# Patient Record
Sex: MALE | Race: BLACK OR AFRICAN AMERICAN | NOT HISPANIC OR LATINO | Employment: FULL TIME | URBAN - METROPOLITAN AREA
[De-identification: names, ages, dates, MRNs, and addresses within clinical notes are randomized per-mention and may not be internally consistent; named-entity substitution may affect disease eponyms.]

---

## 2017-01-06 ENCOUNTER — TELEPHONE (OUTPATIENT)
Dept: FAMILY MEDICINE | Facility: CLINIC | Age: 47
End: 2017-01-06

## 2017-01-06 NOTE — TELEPHONE ENCOUNTER
----- Message from Alexandrea Denson sent at 1/6/2017 12:42 PM CST -----  Contact: [phone]/ self   Pt requesting orders for a stress test . Please advise

## 2017-01-06 NOTE — TELEPHONE ENCOUNTER
Spoke with pt was out of town and starting having chest pains for 2 days on way [place] in [place] went to hospital had EKG it was fine but was told his CKMB level was high and family member told him he needs to have a stress test,MA asked pt would he like to see Tiana first, pt said yes. Schedule pt for Monday morning.

## 2017-01-09 ENCOUNTER — OFFICE VISIT (OUTPATIENT)
Dept: FAMILY MEDICINE | Facility: CLINIC | Age: 47
End: 2017-01-09
Payer: COMMERCIAL

## 2017-01-09 VITALS
BODY MASS INDEX: 29.94 KG/M2 | OXYGEN SATURATION: 99 % | WEIGHT: 186.31 LBS | DIASTOLIC BLOOD PRESSURE: 80 MMHG | HEIGHT: 66 IN | TEMPERATURE: 97 F | SYSTOLIC BLOOD PRESSURE: 120 MMHG | HEART RATE: 62 BPM

## 2017-01-09 DIAGNOSIS — R07.89 ATYPICAL CHEST PAIN: Primary | ICD-10-CM

## 2017-01-09 DIAGNOSIS — Z09 HOSPITAL DISCHARGE FOLLOW-UP: ICD-10-CM

## 2017-01-09 DIAGNOSIS — R74.8 ELEVATED CK-MB LEVEL: ICD-10-CM

## 2017-01-09 PROCEDURE — 99999 PR PBB SHADOW E&M-EST. PATIENT-LVL IV: CPT | Mod: PBBFAC,,, | Performed by: NURSE PRACTITIONER

## 2017-01-09 PROCEDURE — 1159F MED LIST DOCD IN RCRD: CPT | Mod: S$GLB,,, | Performed by: NURSE PRACTITIONER

## 2017-01-09 PROCEDURE — 99214 OFFICE O/P EST MOD 30 MIN: CPT | Mod: S$GLB,,, | Performed by: NURSE PRACTITIONER

## 2017-01-09 RX ORDER — PANTOPRAZOLE SODIUM 40 MG/1
40 TABLET, DELAYED RELEASE ORAL DAILY
Qty: 30 TABLET | Refills: 0 | Status: SHIPPED | OUTPATIENT
Start: 2017-01-09 | End: 2018-03-14 | Stop reason: ALTCHOICE

## 2017-01-09 NOTE — PROGRESS NOTES
"Subjective:       Patient ID: Theo Matthew is a 46 y.o. male.    Chief Complaint: Follow-up (hospital)    HPI Comments: Patient is here today for Hospital follow up.    Patient reports he started with mid-sternal chest wall pain on Friday night 12/30/16 on his way to [place].  He described the pain as a tightness in his chest rated 6/10.  He denied any other symptoms.  He reports that the pain remained from Friday night, all day Saturday and into Gregory morning.  Again all day Saturday the chest tightness was only symptoms - constant tightness rated 6/10 with pain that worsened when he bent over and stood back up - 8/10.  He denied any shortness of breath, no nausea, no vomting, no heartburn.  He reports he did take an OTC Zantac 1 on Sat. AM and 1 on Sat. PM with no changes.  Patient reports on Gregory morning, 1/1/17 - he had increased belching and the pain did reduce down to a 4/10 but still present.  By Monday, 1/2/17, he was driving back home from [place] and he reports that the chest tightness was still a 4/10 but also reports he felt numbness/tingling to right hand and bilateral feet so he hot scared and stopped at [place] ER.  He has ER records.  Diagnosed with Atypical Chest Wall Pain.  The EKG was Normal Sinus Rhythm.  The labs:  CBC - very mild anemia present, CMP okay.  CK-MB was elevated at 5.1.  Troponin normal at < 0.4.    Patient reports that the chest tightness is now more just a very light soreness and no other symptoms now.  He has family members that are Doctors in [place] that advised patient to see PCP for possible stress test per patient report.    Patient had just had a Wellness Exam August 23, 2016.  Patient has no medical history.  Blood pressure is normal./80 (BP Location: Left arm, Patient Position: Sitting, BP Method: Manual)  Pulse 62  Temp 97.3 °F (36.3 °C) (Oral)   Ht 5' 6" (1.676 m)  Wt 84.5 kg (186 lb 4.6 oz)  SpO2 99%  BMI 30.07 kg/m2  His cholesterol levels - the Total " cholesterol was on higher end of normal but fine at 196, LDL borderline high at 144.4 and HDL mildly low at 38.  Patient reports no significant family history of heart disease. Positive obesity with Body mass index is 30.07 kg/(m^2).          Previous Medications    No medications on file       Past Medical History   Diagnosis Date    Internal hemorrhoid        Past Surgical History   Procedure Laterality Date    Knee ligament reconstruction Right        Family History   Problem Relation Age of Onset    Seizures Father     Hypertension Mother     No Known Problems Sister     Cancer Maternal Grandmother      passed 83 pancreate cancer    Diabetes Maternal Grandfather     No Known Problems Paternal Grandfather        Social History     Social History    Marital status:      Spouse name: N/A    Number of children: N/A    Years of education: N/A     Occupational History    baggage claims      Social History Main Topics    Smoking status: Never Smoker    Smokeless tobacco: None    Alcohol use No    Drug use: No    Sexual activity: Not Asked     Other Topics Concern    None     Social History Narrative       Review of Systems   Constitutional: Negative for activity change, appetite change, fatigue, fever and unexpected weight change.   HENT: Negative for congestion, ear pain, mouth sores, nosebleeds, postnasal drip, rhinorrhea, sinus pressure, sneezing, sore throat, trouble swallowing and voice change.    Eyes: Negative.    Respiratory: Positive for chest tightness. Negative for cough and shortness of breath.         Chest tightness is mostly resolved now but had chest tightness with elevated CK-MB at ER visit.   Cardiovascular: Negative for chest pain, palpitations and leg swelling.   Gastrointestinal: Negative.  Negative for abdominal pain, blood in stool, constipation, diarrhea, nausea and vomiting.   Endocrine: Negative.    Genitourinary: Negative for difficulty urinating, dysuria, flank pain,  "hematuria and urgency.   Musculoskeletal: Negative for arthralgias, back pain, gait problem, joint swelling, myalgias and neck pain.   Skin: Negative for color change, rash and wound.   Allergic/Immunologic: Negative for immunocompromised state.   Neurological: Negative for dizziness, tremors, seizures, syncope, speech difficulty and headaches.   Hematological: Negative for adenopathy. Does not bruise/bleed easily.   Psychiatric/Behavioral: Negative for behavioral problems, dysphoric mood, sleep disturbance and suicidal ideas. The patient is not nervous/anxious.          Objective:     Vitals:    01/09/17 0920   BP: 120/80   BP Location: Left arm   Patient Position: Sitting   BP Method: Manual   Pulse: 62   Temp: 97.3 °F (36.3 °C)   TempSrc: Oral   SpO2: 99%   Weight: 84.5 kg (186 lb 4.6 oz)   Height: 5' 6" (1.676 m)          Physical Exam   Constitutional: He is oriented to person, place, and time. He appears well-developed and well-nourished.   Body mass index is 30.07 kg/(m^2).     HENT:   Head: Normocephalic.   Right Ear: External ear normal.   Left Ear: External ear normal.   Nose: Nose normal.   Mouth/Throat: Oropharynx is clear and moist. No oropharyngeal exudate.   Eyes: EOM are normal. Pupils are equal, round, and reactive to light. Right eye exhibits no discharge. Left eye exhibits no discharge. No scleral icterus.   Neck: Normal range of motion. Neck supple. No tracheal deviation present. No thyromegaly present.   Cardiovascular: Normal rate, regular rhythm and normal heart sounds.    No murmur heard.  Pulmonary/Chest: Effort normal and breath sounds normal. No respiratory distress.   Abdominal: Soft. He exhibits no distension.   Musculoskeletal: Normal range of motion. He exhibits no edema.   Lymphadenopathy:     He has no cervical adenopathy.   Neurological: He is alert and oriented to person, place, and time. Coordination normal.   Skin: Skin is warm and dry. No rash noted.   Psychiatric: He has a " normal mood and affect. His behavior is normal.         Assessment:         ICD-10-CM ICD-9-CM   1. Atypical chest pain R07.89 786.59   2. Elevated CK-MB level R74.8 790.5   3. Hospital discharge follow-up Z09 V67.59       Plan:       Atypical chest pain  -  Will refer patient to cardiology - appointment made on 1/11/17 - atypical chest pain and mildly elevated CK-Mb - the cardiologist could then decide on appropriate testing for patient.  Take Protonix 40 mg daily for 4 weeks to see if this will help resolve pain.  -     Ambulatory Referral to Cardiology  -     pantoprazole (PROTONIX) 40 MG tablet; Take 1 tablet (40 mg total) by mouth once daily.  Dispense: 30 tablet; Refill: 0    Elevated CK-MB level  -     Ambulatory Referral to Cardiology    Hospital discharge follow-up  -  Hospital ER record scanned into media file.    Return for keep appointment with cardiology.     Patient's Medications   New Prescriptions    PANTOPRAZOLE (PROTONIX) 40 MG TABLET    Take 1 tablet (40 mg total) by mouth once daily.   Previous Medications    No medications on file   Modified Medications    No medications on file   Discontinued Medications    No medications on file

## 2017-01-09 NOTE — PATIENT INSTRUCTIONS

## 2017-01-09 NOTE — MR AVS SNAPSHOT
[facility] Medicine  [address]  [place] [address]  Phone: [phone]  Fax: [phone]                  Theo Matthew   2017 8:20 AM   Office Visit    Description:  Male : 1970   Provider:  Tiana Renae NP   Department:  [facility]           Reason for Visit     Follow-up           Diagnoses this Visit        Comments    Atypical chest pain    -  Primary     Elevated CK-MB level         Hospital discharge follow-up                To Do List           Future Appointments        Provider Department Dept Phone    2017 2:00 PM MD [facility] - Cardiology [phone]      Goals (5 Years of Data)     None      Follow-Up and Disposition     Return for keep appointment with cardiology.       These Medications        Disp Refills Start End    pantoprazole (PROTONIX) 40 MG tablet 30 tablet 0 2017    Take 1 tablet (40 mg total) by mouth once daily. - Oral    Pharmacy: [facility]/pharmacy #[id number] - [place] - [address] [facility] Ph #: [phone]         [facility] On Call     [facility] On Call Nurse Care Line -  Assistance  Registered nurses in the [facility] On Call Center provide clinical advisement, health education, appointment booking, and other advisory services.  Call for this free service at [phone].             Medications           START taking these NEW medications        Refills    pantoprazole (PROTONIX) 40 MG tablet 0    Sig: Take 1 tablet (40 mg total) by mouth once daily.    Class: Normal    Route: Oral           Verify that the below list of medications is an accurate representation of the medications you are currently taking.  If none reported, the list may be blank. If incorrect, please contact your healthcare provider. Carry this list with you in case of emergency.           Current Medications     pantoprazole (PROTONIX) 40 MG tablet Take 1 tablet (40 mg total) by mouth once daily.           Clinical Reference  "Information           Vital Signs - Last Recorded  Most recent update: 1/9/2017  9:28 AM by Chrissy Pace MA    BP Pulse Temp Ht Wt SpO2    120/80 (BP Location: Left arm, Patient Position: Sitting, BP Method: Manual) 62 97.3 °F (36.3 °C) (Oral) 5' 6" (1.676 m) 84.5 kg (186 lb 4.6 oz) 99%    BMI                30.07 kg/m2          Blood Pressure          Most Recent Value    BP  120/80      Allergies as of 1/9/2017     No Known Allergies      Immunizations Administered on Date of Encounter - 1/9/2017     None      Orders Placed During Today's Visit      Normal Orders This Visit    Ambulatory Referral to Cardiology       [facility] Sign-Up     Activating your [facility] account is as easy as 1-2-3!     1) Visit [email or web address], select Sign Up Now, enter this activation code and your date of birth, then select Next.  [email or web address]  Expires: 2/23/2017 10:06 AM      2) Create a username and password to use when you visit [facility] in the future and select a security question in case you lose your password and select Next.    3) Enter your e-mail address and click Sign Up!    Additional Information  If you have questions, please e-mail [email or web address] or call [phone] to talk to our [facility] staff. Remember, [facility] is NOT to be used for urgent needs. For medical emergencies, dial 911.         Instructions      GERD (Adult)    The esophagus is a tube that carries food from the mouth to the stomach. A valve at the lower end of the esophagus prevents stomach acid from flowing upward. When this valve doesn't work properly, stomach contents may repeatedly flow back up (reflux) into the esophagus. This is called gastroesophageal reflux disease (GERD). GERD can irritate the esophagus. It can cause problems with swallowing or breathing. In severe cases, GERD can cause recurrent pneumonia or other serious problems.  Symptoms of reflux include burning, pressure or sharp pain in the upper abdomen or mid to lower " "chest. The pain can spread to the neck, back, or shoulder. There may be belching, an acid taste in the back of the throat, chronic cough, or sore throat or hoarseness. GERD symptoms often occur during the day after a big meal. They can also occur at night when lying down.   Home care  Lifestyle changes can help reduce symptoms. If needed, medicines may be prescribed. Symptoms often improve with treatment, but if treatment is stopped, the symptoms often return after a few months. So most persons with GERD will need to continue treatment.  Lifestyle changes  · Limit or avoid fatty, fried, and spicy foods, as well as coffee, chocolate, mint, and foods with high acid content such as tomatoes and citrus fruit and juices (orange, grapefruit, lemon).  · Dont eat large meals, especially at night. Frequent, smaller meals are best. Do not lie down right after eating. And dont eat anything 3 hours before going to bed.  · Avoid drinking alcohol and smoking. As much as possible, stay away from second hand smoke.  · If you are overweight, losing weight will reduce symptoms.   · Avoid wearing tight clothing around your stomach area.  · If your symptoms occur during sleep, use a foam wedge to elevate your upper body (not just your head.) Or, place 4" blocks under the head of your bed.  Medicines  If needed, medicines can help relieve the symptoms of GERD and prevent damage to the esophagus. Discuss a medicine plan with your healthcare provider. This may include one or more of the following medicines:  · Antacids to help neutralize the normal acids in your stomach.  · Acid blockers (H2 blockers) to decrease acid production.  · Acid inhibitors (PPIs) to decrease acid production in a different way than the blockers. They may work better, but can take a little longer to take effect.  Take an antacid 30-60 minutes after eating and at bedtime, but not at the same time as an acid blocker.  Try not to take medicines such as ibuprofen and " aspirin. If you are taking aspirin for your heart or other medical reasons, talk to your healthcare provider about stopping it.  Follow-up care  Follow up with your healthcare provider or as advised by our staff.  When to seek medical advice  Call your healthcare provider if any of the following occur:  · Stomach pain gets worse or moves to the lower right abdomen (appendix area)  · Chest pain appears or gets worse, or spreads to the back, neck, shoulder, or arm  · Frequent vomiting (cant keep down liquids)  · Blood in the stool or vomit (red or black in color)  · Feeling weak or dizzy  · Fever of 100.4ºF (38ºC) or higher, or as directed by your healthcare provider  © 2105-4860 [employer]. [address] [place] [address], [place] [address]. All rights reserved. This information is not intended as a substitute for professional medical care. Always follow your healthcare professional's instructions.

## 2017-01-11 ENCOUNTER — OFFICE VISIT (OUTPATIENT)
Dept: CARDIOLOGY | Facility: CLINIC | Age: 47
End: 2017-01-11
Payer: COMMERCIAL

## 2017-01-11 VITALS
SYSTOLIC BLOOD PRESSURE: 127 MMHG | OXYGEN SATURATION: 97 % | BODY MASS INDEX: 29.62 KG/M2 | WEIGHT: 184.31 LBS | HEIGHT: 66 IN | HEART RATE: 67 BPM | DIASTOLIC BLOOD PRESSURE: 88 MMHG

## 2017-01-11 DIAGNOSIS — R07.9 CHEST PAIN, UNSPECIFIED TYPE: Primary | ICD-10-CM

## 2017-01-11 PROCEDURE — 99204 OFFICE O/P NEW MOD 45 MIN: CPT | Mod: S$GLB,,, | Performed by: INTERNAL MEDICINE

## 2017-01-11 PROCEDURE — 99999 PR PBB SHADOW E&M-EST. PATIENT-LVL III: CPT | Mod: PBBFAC,,, | Performed by: INTERNAL MEDICINE

## 2017-01-11 PROCEDURE — 1159F MED LIST DOCD IN RCRD: CPT | Mod: S$GLB,,, | Performed by: INTERNAL MEDICINE

## 2017-01-11 NOTE — PROGRESS NOTES
Subjective:   Patient ID:  Theo Matthew is a 46 y.o. male who presents for evaluation of Establish Care (chest pain)      HPI: 45 y/o AA male with no significant PMH is here to establish care secondary to chest pain that occurred about 2 weeks ago while he was in [place] visiting family. Discomfort was located mid sternally, tight in quality and moderate in intensity. No associated dyspnea and bending over made discomfort worse. He went to ER in [place] and ECG and labs were normal. On his way home he had symptoms again associated with numbness and tingling in arms so he stopped at ER in [place]. ECG and labs again was normal. He does not smoke and have no DM or HTN. No known family history of CAD/MI. BP is controlled. He is currently working with [employer] and does exertional work, he was at work today with no symptoms.     Of note patient fasted for 3 days for religous reason and episode occurred after eating for the first time after that time.     Past Medical History   Diagnosis Date    Internal hemorrhoid        Past Surgical History   Procedure Laterality Date    Knee ligament reconstruction Right        Social History   Substance Use Topics    Smoking status: Never Smoker    Smokeless tobacco: None    Alcohol use No       Family History   Problem Relation Age of Onset    Seizures Father     Hypertension Mother     No Known Problems Sister     Cancer Maternal Grandmother      passed 83 pancreate cancer    Diabetes Maternal Grandfather     No Known Problems Paternal Grandfather        Patient's Medications   New Prescriptions    No medications on file   Previous Medications    PANTOPRAZOLE (PROTONIX) 40 MG TABLET    Take 1 tablet (40 mg total) by mouth once daily.   Modified Medications    No medications on file   Discontinued Medications    No medications on file        Review of patient's allergies indicates:  No Known Allergies    Review of Systems   Constitution: Negative.   HENT: Negative.     Eyes: Negative.    Cardiovascular: Negative.    Respiratory: Negative.    Endocrine: Negative.    Hematologic/Lymphatic: Negative.    Skin: Negative.    Musculoskeletal: Negative.    Gastrointestinal: Negative.    Neurological: Negative.    Psychiatric/Behavioral: Negative.      Objective:   Physical Exam   Constitutional: He is oriented to person, place, and time. He appears well-developed and well-nourished. No distress.   Examination of the digits showed no clubbing or cyanosis   HENT:   Head: Normocephalic and atraumatic.   Eyes: Conjunctivae are normal. Pupils are equal, round, and reactive to light. Right eye exhibits no discharge.   Neck: Normal range of motion. Neck supple. No JVD present. No thyromegaly present.   No carotid bruits   Cardiovascular: Normal rate, regular rhythm, S1 normal, S2 normal, normal heart sounds, intact distal pulses and normal pulses.  PMI is not displaced.  Exam reveals no gallop, no friction rub and no opening snap.    No murmur heard.  Pulmonary/Chest: Effort normal and breath sounds normal. No respiratory distress. He has no wheezes. He has no rales. He exhibits no tenderness.   Abdominal: Soft. Bowel sounds are normal. He exhibits no distension and no mass. There is no tenderness. There is no guarding.   No hepatosplenomegaly   Musculoskeletal: Normal range of motion. He exhibits no edema or tenderness.   Lymphadenopathy:     He has no cervical adenopathy.   Neurological: He is alert and oriented to person, place, and time.   Skin: Skin is warm. No rash noted. He is not diaphoretic. No erythema.   Psychiatric: He has a normal mood and affect.   Nursing note and vitals reviewed.      Lab Results   Component Value Date    WBC 5.92 08/10/2016    HGB 14.1 08/10/2016    HCT 41.9 08/10/2016    MCV 87 08/10/2016     08/10/2016         Chemistry        Component Value Date/Time     08/10/2016 1503    K 3.8 08/10/2016 1503     08/10/2016 1503    CO2 30 (H)  08/10/2016 1503    BUN 20 08/10/2016 1503    CREATININE 1.0 08/10/2016 1503    GLU 97 08/10/2016 1503        Component Value Date/Time    CALCIUM 9.3 08/10/2016 1503    ALKPHOS 56 08/10/2016 1503    AST 19 08/10/2016 1503    ALT 17 08/10/2016 1503    BILITOT 0.5 08/10/2016 1503            Lab Results   Component Value Date    CHOL 196 08/10/2016     Lab Results   Component Value Date    HDL 38 (L) 08/10/2016     Lab Results   Component Value Date    LDLCALC 144.4 08/10/2016     Lab Results   Component Value Date    TRIG 68 08/10/2016     Lab Results   Component Value Date    CHOLHDL 19.4 (L) 08/10/2016       Lab Results   Component Value Date    TSH 1.130 08/10/2016       No results found for: HGBA1C    ECGs reviewed-Sinus bradycardia  LABS reviewed-in media      Assessment:     1. Chest pain, unspecified type        Plan:     Discomfort unlikely cardiac, pain is however considered atypical. No risk factors such as HTN, DM, smoking or family history.    Will get stress echo complete  Activity as tolerated  F/u in 6 months.

## 2017-01-11 NOTE — LETTER
January 11, 2017      [place]  [address]  [address]  [facility] [address]           [place] - Cardiology  [address], [address]  [place] [address]  Phone: [phone]          Patient: Theo Matthew   MR Number: [id number]   YOB: 1970   Date of Visit: 1/11/2017       Dear Tiana Renae:    Thank you for referring Theo Matthew to me for evaluation. Attached you will find relevant portions of my assessment and plan of care.    If you have questions, please do not hesitate to call me. I look forward to following Theo Matthew along with you.    Sincerely,    Mary Cordero MD    Enclosure  CC:  No Recipients    If you would like to receive this communication electronically, please contact [email or web address] or [phone] to request more information on [employer] access.    For providers and/or their staff who would like to refer a patient to [facility], please contact us through our one-stop-shop provider referral line, Kassandra Craven, at [phone].    If you feel you have received this communication in error or would no longer like to receive these types of communications, please e-mail [email or web address]

## 2017-01-11 NOTE — MR AVS SNAPSHOT
"    [place] - Cardiology  [address], [address]  [place] [address]  Phone: [phone]                  Theo Matthew   2017 2:00 PM   Office Visit    Description:  Male : 1970   Provider:  Mary Cordero MD   Department:  [facility] - Cardiology           Reason for Visit     Establish Care           Diagnoses this Visit        Comments    Chest pain, unspecified type    -  Primary            To Do List           Goals (5 Years of Data)     None      [facility] On Call     [facility] On Call Nurse Care Line -  Assistance  Registered nurses in the [facility] On Call Center provide clinical advisement, health education, appointment booking, and other advisory services.  Call for this free service at [phone].             Medications                Verify that the below list of medications is an accurate representation of the medications you are currently taking.  If none reported, the list may be blank. If incorrect, please contact your healthcare provider. Carry this list with you in case of emergency.           Current Medications     pantoprazole (PROTONIX) 40 MG tablet Take 1 tablet (40 mg total) by mouth once daily.           Clinical Reference Information           Vital Signs - Last Recorded  Most recent update: 2017  2:08 PM by Adriane M Toussaint, LPN    BP Pulse Ht Wt SpO2 BMI    127/88 67 5' 6" (1.676 m) 83.6 kg (184 lb 4.8 oz) 97% 29.75 kg/m2      Blood Pressure          Most Recent Value    BP  127/88      Allergies as of 2017     No Known Allergies      Immunizations Administered on Date of Encounter - 2017     None      Orders Placed During Today's Visit     Future Labs/Procedures Expected by Expires    Exercise stress echo with color flow  As directed 2018      [facility] Sign-Up     Activating your [facility] account is as easy as 1-2-3!     1) Visit [email or web address], select Sign Up Now, enter this activation code and your date of birth, then select " Next.  [email or web address]  Expires: 2/23/2017 10:06 AM      2) Create a username and password to use when you visit [facility] in the future and select a security question in case you lose your password and select Next.    3) Enter your e-mail address and click Sign Up!    Additional Information  If you have questions, please e-mail [email or web address] or call [phone] to talk to our [facility] staff. Remember, [facility] is NOT to be used for urgent needs. For medical emergencies, dial 911.

## 2017-01-16 ENCOUNTER — HOSPITAL ENCOUNTER (OUTPATIENT)
Dept: CARDIOLOGY | Facility: HOSPITAL | Age: 47
Discharge: HOME | End: 2017-01-16
Attending: INTERNAL MEDICINE
Payer: COMMERCIAL

## 2017-01-16 DIAGNOSIS — R07.9 CHEST PAIN, UNSPECIFIED TYPE: ICD-10-CM

## 2017-01-16 LAB
ESTIMATED PA SYSTOLIC PRESSURE: 18.15
RETIRED EF AND QEF - SEE NOTES: 65 (ref 55–65)

## 2017-01-16 PROCEDURE — 93325 DOPPLER ECHO COLOR FLOW MAPG: CPT | Mod: 26,,, | Performed by: INTERNAL MEDICINE

## 2017-01-16 PROCEDURE — 93351 STRESS TTE COMPLETE: CPT | Mod: 26,,, | Performed by: INTERNAL MEDICINE

## 2017-01-16 PROCEDURE — 93351 STRESS TTE COMPLETE: CPT

## 2017-01-16 PROCEDURE — 93320 DOPPLER ECHO COMPLETE: CPT | Mod: 26,,, | Performed by: INTERNAL MEDICINE

## 2017-01-17 ENCOUNTER — TELEPHONE (OUTPATIENT)
Dept: CARDIOLOGY | Facility: CLINIC | Age: 47
End: 2017-01-17

## 2017-01-17 NOTE — TELEPHONE ENCOUNTER
patient notified that stress test is negative. Chest pain likely gas or muscle skeletal pain.   Patient verbalized understanding.

## 2017-02-08 DIAGNOSIS — R07.89 ATYPICAL CHEST PAIN: ICD-10-CM

## 2017-02-08 RX ORDER — PANTOPRAZOLE SODIUM 40 MG/1
TABLET, DELAYED RELEASE ORAL
Qty: 30 TABLET | Refills: 0 | OUTPATIENT
Start: 2017-02-08

## 2018-03-14 ENCOUNTER — OFFICE VISIT (OUTPATIENT)
Dept: FAMILY MEDICINE | Facility: CLINIC | Age: 48
End: 2018-03-14
Payer: COMMERCIAL

## 2018-03-14 VITALS
HEART RATE: 79 BPM | WEIGHT: 192.44 LBS | DIASTOLIC BLOOD PRESSURE: 80 MMHG | TEMPERATURE: 99 F | HEIGHT: 66 IN | SYSTOLIC BLOOD PRESSURE: 112 MMHG | BODY MASS INDEX: 30.93 KG/M2 | OXYGEN SATURATION: 97 %

## 2018-03-14 DIAGNOSIS — Z00.00 ANNUAL PHYSICAL EXAM: Primary | ICD-10-CM

## 2018-03-14 DIAGNOSIS — Z13.29 THYROID DISORDER SCREEN: ICD-10-CM

## 2018-03-14 DIAGNOSIS — Z13.1 DIABETES MELLITUS SCREENING: ICD-10-CM

## 2018-03-14 DIAGNOSIS — Z13.220 SCREENING CHOLESTEROL LEVEL: ICD-10-CM

## 2018-03-14 DIAGNOSIS — Z13.0 SCREENING, ANEMIA, DEFICIENCY, IRON: ICD-10-CM

## 2018-03-14 PROCEDURE — 99999 PR PBB SHADOW E&M-EST. PATIENT-LVL III: CPT | Mod: PBBFAC,,, | Performed by: NURSE PRACTITIONER

## 2018-03-14 PROCEDURE — 99396 PREV VISIT EST AGE 40-64: CPT | Mod: S$GLB,,, | Performed by: NURSE PRACTITIONER

## 2018-03-14 NOTE — PROGRESS NOTES
Subjective:       Patient ID: Theo Matthew is a 47 y.o. male.    Chief Complaint: Annual Exam (wellness)    Patient is a 47-year-old black male that is here for annual physical exam.  Patient reports no significant past medical history.  Patient will go next week for fasting labs.    Health maintenance:  -  Patient declines flu vaccine    Patient only complaint is of intermittent vision changes.  Patient advised to see eye doctor to evaluate patient.        Previous Medications    No medications on file       Past Medical History:   Diagnosis Date    Internal hemorrhoid        Past Surgical History:   Procedure Laterality Date    KNEE LIGAMENT RECONSTRUCTION Right        Family History   Problem Relation Age of Onset    Seizures Father     Hypertension Mother     No Known Problems Sister     Cancer Maternal Grandmother      passed 83 pancreate cancer    Diabetes Maternal Grandfather     No Known Problems Paternal Grandfather        Social History     Social History    Marital status:      Spouse name: N/A    Number of children: N/A    Years of education: N/A     Occupational History    baggage claims      Social History Main Topics    Smoking status: Never Smoker    Smokeless tobacco: None    Alcohol use No    Drug use: No    Sexual activity: Not Asked     Other Topics Concern    None     Social History Narrative    None       Review of Systems   Constitutional: Negative for activity change, appetite change, fatigue, fever and unexpected weight change.   HENT: Negative for congestion, ear pain, mouth sores, nosebleeds, postnasal drip, rhinorrhea, sinus pressure, sneezing, sore throat, trouble swallowing and voice change.    Eyes: Negative.    Respiratory: Negative for cough, chest tightness and shortness of breath.    Cardiovascular: Negative for chest pain, palpitations and leg swelling.   Gastrointestinal: Negative.  Negative for abdominal pain, blood in stool, constipation, diarrhea,  "nausea and vomiting.   Endocrine: Negative.    Genitourinary: Negative for difficulty urinating, dysuria, flank pain, hematuria and urgency.   Musculoskeletal: Negative for arthralgias, back pain, gait problem, joint swelling, myalgias and neck pain.   Skin: Negative for color change, rash and wound.   Allergic/Immunologic: Negative for immunocompromised state.   Neurological: Negative for dizziness, tremors, seizures, syncope, speech difficulty and headaches.   Hematological: Negative for adenopathy. Does not bruise/bleed easily.   Psychiatric/Behavioral: Negative for behavioral problems, dysphoric mood, sleep disturbance and suicidal ideas. The patient is not nervous/anxious.          Objective:     Vitals:    03/14/18 1436   BP: 112/80   BP Location: Left arm   Patient Position: Sitting   BP Method: Medium (Manual)   Pulse: 79   Temp: 98.7 °F (37.1 °C)   TempSrc: Oral   SpO2: 97%   Weight: 87.3 kg (192 lb 7.4 oz)   Height: 5' 6" (1.676 m)          Physical Exam   Constitutional: He is oriented to person, place, and time. He appears well-developed and well-nourished.   Body mass index is 31.06 kg/m².     HENT:   Head: Normocephalic.   Right Ear: External ear normal.   Left Ear: External ear normal.   Nose: Nose normal.   Mouth/Throat: Oropharynx is clear and moist. No oropharyngeal exudate.   Eyes: EOM are normal. Pupils are equal, round, and reactive to light. Right eye exhibits no discharge. Left eye exhibits no discharge. No scleral icterus.   Neck: Normal range of motion. Neck supple. No tracheal deviation present. No thyromegaly present.   Cardiovascular: Normal rate, regular rhythm and normal heart sounds.    No murmur heard.  Pulmonary/Chest: Effort normal and breath sounds normal. No respiratory distress.   Abdominal: Soft. He exhibits no distension and no mass. There is no tenderness. There is no guarding.   Musculoskeletal: Normal range of motion. He exhibits no edema.   Lymphadenopathy:     He has no " cervical adenopathy.   Neurological: He is alert and oriented to person, place, and time. Coordination normal.   Skin: Skin is warm and dry. No rash noted.   Psychiatric: He has a normal mood and affect. His behavior is normal.         Assessment:         ICD-10-CM ICD-9-CM   1. Annual physical exam Z00.00 V70.0   2. Screening, anemia, deficiency, iron Z13.0 V78.0   3. Thyroid disorder screen Z13.29 V77.0   4. Screening cholesterol level Z13.220 V77.91   5. Diabetes mellitus screening Z13.1 V77.1       Plan:       Annual physical exam  -  Patient to go get fasting labs next week.  We'll send results of a patient portal.  Health Maintenance Summary     Lipid Panel Next Due 8/10/2021     Done 8/10/2016 LIPID PANEL    TETANUS VACCINE Next Due 8/22/2026     Done 8/22/2016 Imm Admin: Tdap   Influenza Vaccine Addressed     Declined 3/14/2018     Declined 1/9/2017        Screening, anemia, deficiency, iron  -     CBC auto differential; Future; Expected date: 03/14/2018    Thyroid disorder screen  -     TSH; Future; Expected date: 03/14/2018    Screening cholesterol level  -     Lipid panel; Future; Expected date: 03/14/2018    Diabetes mellitus screening  -     Comprehensive metabolic panel; Future; Expected date: 03/14/2018      Follow-up in about 1 year (around 3/14/2019).     Patient's Medications   New Prescriptions    No medications on file   Previous Medications    No medications on file   Modified Medications    No medications on file   Discontinued Medications    PANTOPRAZOLE (PROTONIX) 40 MG TABLET    Take 1 tablet (40 mg total) by mouth once daily.

## 2018-03-16 ENCOUNTER — PATIENT MESSAGE (OUTPATIENT)
Dept: FAMILY MEDICINE | Facility: CLINIC | Age: 48
End: 2018-03-16

## 2018-03-16 ENCOUNTER — LAB VISIT (OUTPATIENT)
Dept: LAB | Facility: HOSPITAL | Age: 48
End: 2018-03-16
Attending: NURSE PRACTITIONER
Payer: COMMERCIAL

## 2018-03-16 DIAGNOSIS — Z13.1 DIABETES MELLITUS SCREENING: ICD-10-CM

## 2018-03-16 DIAGNOSIS — Z13.29 THYROID DISORDER SCREEN: ICD-10-CM

## 2018-03-16 DIAGNOSIS — Z13.220 SCREENING CHOLESTEROL LEVEL: ICD-10-CM

## 2018-03-16 DIAGNOSIS — Z13.0 SCREENING, ANEMIA, DEFICIENCY, IRON: ICD-10-CM

## 2018-03-16 LAB
ALBUMIN SERPL BCP-MCNC: 4.2 G/DL
ALP SERPL-CCNC: 56 U/L
ALT SERPL W/O P-5'-P-CCNC: 22 U/L
ANION GAP SERPL CALC-SCNC: 10 MMOL/L
AST SERPL-CCNC: 24 U/L
BASOPHILS # BLD AUTO: 0.02 K/UL
BASOPHILS NFR BLD: 0.3 %
BILIRUB SERPL-MCNC: 0.9 MG/DL
BUN SERPL-MCNC: 18 MG/DL
CALCIUM SERPL-MCNC: 9.9 MG/DL
CHLORIDE SERPL-SCNC: 102 MMOL/L
CHOLEST SERPL-MCNC: 218 MG/DL
CHOLEST/HDLC SERPL: 5.7 {RATIO}
CO2 SERPL-SCNC: 24 MMOL/L
CREAT SERPL-MCNC: 1 MG/DL
DIFFERENTIAL METHOD: ABNORMAL
EOSINOPHIL # BLD AUTO: 0 K/UL
EOSINOPHIL NFR BLD: 0.6 %
ERYTHROCYTE [DISTWIDTH] IN BLOOD BY AUTOMATED COUNT: 13.2 %
EST. GFR  (AFRICAN AMERICAN): >60 ML/MIN/1.73 M^2
EST. GFR  (NON AFRICAN AMERICAN): >60 ML/MIN/1.73 M^2
GLUCOSE SERPL-MCNC: 86 MG/DL
HCT VFR BLD AUTO: 43 %
HDLC SERPL-MCNC: 38 MG/DL
HDLC SERPL: 17.4 %
HGB BLD-MCNC: 14.7 G/DL
LDLC SERPL CALC-MCNC: 168 MG/DL
LYMPHOCYTES # BLD AUTO: 3.6 K/UL
LYMPHOCYTES NFR BLD: 55.2 %
MCH RBC QN AUTO: 29.6 PG
MCHC RBC AUTO-ENTMCNC: 34.2 G/DL
MCV RBC AUTO: 87 FL
MONOCYTES # BLD AUTO: 0.5 K/UL
MONOCYTES NFR BLD: 7.3 %
NEUTROPHILS # BLD AUTO: 2.4 K/UL
NEUTROPHILS NFR BLD: 36.4 %
NONHDLC SERPL-MCNC: 180 MG/DL
PLATELET # BLD AUTO: 196 K/UL
PMV BLD AUTO: 11.5 FL
POTASSIUM SERPL-SCNC: 4 MMOL/L
PROT SERPL-MCNC: 7.8 G/DL
RBC # BLD AUTO: 4.97 M/UL
SODIUM SERPL-SCNC: 136 MMOL/L
TRIGL SERPL-MCNC: 60 MG/DL
TSH SERPL DL<=0.005 MIU/L-ACNC: 0.96 UIU/ML
WBC # BLD AUTO: 6.54 K/UL

## 2018-03-16 PROCEDURE — 80053 COMPREHEN METABOLIC PANEL: CPT

## 2018-03-16 PROCEDURE — 36415 COLL VENOUS BLD VENIPUNCTURE: CPT

## 2018-03-16 PROCEDURE — 85025 COMPLETE CBC W/AUTO DIFF WBC: CPT

## 2018-03-16 PROCEDURE — 84443 ASSAY THYROID STIM HORMONE: CPT

## 2018-03-16 PROCEDURE — 80061 LIPID PANEL: CPT

## 2018-03-19 ENCOUNTER — PATIENT MESSAGE (OUTPATIENT)
Dept: FAMILY MEDICINE | Facility: CLINIC | Age: 48
End: 2018-03-19

## 2018-03-19 ENCOUNTER — TELEPHONE (OUTPATIENT)
Dept: FAMILY MEDICINE | Facility: CLINIC | Age: 48
End: 2018-03-19

## 2019-12-06 ENCOUNTER — TELEPHONE (OUTPATIENT)
Dept: FAMILY MEDICINE | Facility: CLINIC | Age: 49
End: 2019-12-06

## 2019-12-06 NOTE — TELEPHONE ENCOUNTER
----- Message from Estella Cotter sent at 12/6/2019  3:13 PM CST -----  Contact: self / [phone]  Wants to be seen on Monday 9th for an annual appointment. Please advise

## 2019-12-06 NOTE — TELEPHONE ENCOUNTER
----- Message from Viktoriya Alberts sent at 12/6/2019  4:46 PM CST -----  Contact: [phone]/self  Patient returning your call   Please advise

## 2019-12-06 NOTE — TELEPHONE ENCOUNTER
Called and spoke with pt in regards of needing an appt for a Wellness exam. Informed pt NP Batool was out of the offfice. Informed pt NP Guero has appt, but all the dates and times were not working for pt. Pt made an appt on 12/16/2019 to see Dr. Becerra for his Wellness. Informed pt to come to office fasting for labs.

## 2019-12-16 ENCOUNTER — OFFICE VISIT (OUTPATIENT)
Dept: FAMILY MEDICINE | Facility: CLINIC | Age: 49
End: 2019-12-16
Payer: COMMERCIAL

## 2019-12-16 VITALS
HEART RATE: 65 BPM | DIASTOLIC BLOOD PRESSURE: 82 MMHG | TEMPERATURE: 99 F | HEIGHT: 66 IN | OXYGEN SATURATION: 96 % | SYSTOLIC BLOOD PRESSURE: 132 MMHG | WEIGHT: 187.25 LBS | BODY MASS INDEX: 30.09 KG/M2

## 2019-12-16 DIAGNOSIS — E78.2 MIXED HYPERLIPIDEMIA: ICD-10-CM

## 2019-12-16 DIAGNOSIS — Z12.11 SCREENING FOR COLON CANCER: ICD-10-CM

## 2019-12-16 DIAGNOSIS — Z00.00 ANNUAL PHYSICAL EXAM: Primary | ICD-10-CM

## 2019-12-16 DIAGNOSIS — M17.0 PRIMARY OSTEOARTHRITIS OF BOTH KNEES: ICD-10-CM

## 2019-12-16 DIAGNOSIS — E66.9 OBESITY (BMI 30-39.9): ICD-10-CM

## 2019-12-16 LAB
ALBUMIN SERPL BCP-MCNC: 4.2 G/DL (ref 3.5–5.2)
ALP SERPL-CCNC: 58 U/L (ref 55–135)
ALT SERPL W/O P-5'-P-CCNC: 25 U/L (ref 10–44)
ANION GAP SERPL CALC-SCNC: 6 MMOL/L (ref 8–16)
AST SERPL-CCNC: 28 U/L (ref 10–40)
BASOPHILS # BLD AUTO: 0.05 K/UL (ref 0–0.2)
BASOPHILS NFR BLD: 0.9 % (ref 0–1.9)
BILIRUB SERPL-MCNC: 0.6 MG/DL (ref 0.1–1)
BUN SERPL-MCNC: 16 MG/DL (ref 6–20)
CALCIUM SERPL-MCNC: 9.9 MG/DL (ref 8.7–10.5)
CHLORIDE SERPL-SCNC: 103 MMOL/L (ref 95–110)
CHOLEST SERPL-MCNC: 204 MG/DL (ref 120–199)
CHOLEST/HDLC SERPL: 4.7 {RATIO} (ref 2–5)
CO2 SERPL-SCNC: 30 MMOL/L (ref 23–29)
CREAT SERPL-MCNC: 1 MG/DL (ref 0.5–1.4)
DIFFERENTIAL METHOD: ABNORMAL
EOSINOPHIL # BLD AUTO: 0 K/UL (ref 0–0.5)
EOSINOPHIL NFR BLD: 0.7 % (ref 0–8)
ERYTHROCYTE [DISTWIDTH] IN BLOOD BY AUTOMATED COUNT: 13.3 % (ref 11.5–14.5)
EST. GFR  (AFRICAN AMERICAN): >60 ML/MIN/1.73 M^2
EST. GFR  (NON AFRICAN AMERICAN): >60 ML/MIN/1.73 M^2
GLUCOSE SERPL-MCNC: 99 MG/DL (ref 70–110)
HCT VFR BLD AUTO: 47.4 % (ref 40–54)
HDLC SERPL-MCNC: 43 MG/DL (ref 40–75)
HDLC SERPL: 21.1 % (ref 20–50)
HGB BLD-MCNC: 15.3 G/DL (ref 14–18)
IMM GRANULOCYTES # BLD AUTO: 0.01 K/UL (ref 0–0.04)
IMM GRANULOCYTES NFR BLD AUTO: 0.2 % (ref 0–0.5)
LDLC SERPL CALC-MCNC: 147.4 MG/DL (ref 63–159)
LYMPHOCYTES # BLD AUTO: 3 K/UL (ref 1–4.8)
LYMPHOCYTES NFR BLD: 53.9 % (ref 18–48)
MCH RBC QN AUTO: 28.9 PG (ref 27–31)
MCHC RBC AUTO-ENTMCNC: 32.3 G/DL (ref 32–36)
MCV RBC AUTO: 89 FL (ref 82–98)
MONOCYTES # BLD AUTO: 0.5 K/UL (ref 0.3–1)
MONOCYTES NFR BLD: 9.3 % (ref 4–15)
NEUTROPHILS # BLD AUTO: 2 K/UL (ref 1.8–7.7)
NEUTROPHILS NFR BLD: 35 % (ref 38–73)
NONHDLC SERPL-MCNC: 161 MG/DL
NRBC BLD-RTO: 0 /100 WBC
PLATELET # BLD AUTO: 194 K/UL (ref 150–350)
PMV BLD AUTO: 12.4 FL (ref 9.2–12.9)
POTASSIUM SERPL-SCNC: 4.5 MMOL/L (ref 3.5–5.1)
PROT SERPL-MCNC: 7.9 G/DL (ref 6–8.4)
RBC # BLD AUTO: 5.3 M/UL (ref 4.6–6.2)
SODIUM SERPL-SCNC: 139 MMOL/L (ref 136–145)
TRIGL SERPL-MCNC: 68 MG/DL (ref 30–150)
WBC # BLD AUTO: 5.6 K/UL (ref 3.9–12.7)

## 2019-12-16 PROCEDURE — 99396 PR PREVENTIVE VISIT,EST,40-64: ICD-10-PCS | Mod: S$GLB,,, | Performed by: INTERNAL MEDICINE

## 2019-12-16 PROCEDURE — 80053 COMPREHEN METABOLIC PANEL: CPT

## 2019-12-16 PROCEDURE — 36415 COLL VENOUS BLD VENIPUNCTURE: CPT

## 2019-12-16 PROCEDURE — 85025 COMPLETE CBC W/AUTO DIFF WBC: CPT

## 2019-12-16 PROCEDURE — 99999 PR PBB SHADOW E&M-EST. PATIENT-LVL III: ICD-10-PCS | Mod: PBBFAC,,, | Performed by: INTERNAL MEDICINE

## 2019-12-16 PROCEDURE — 99999 PR PBB SHADOW E&M-EST. PATIENT-LVL III: CPT | Mod: PBBFAC,,, | Performed by: INTERNAL MEDICINE

## 2019-12-16 PROCEDURE — 99396 PREV VISIT EST AGE 40-64: CPT | Mod: S$GLB,,, | Performed by: INTERNAL MEDICINE

## 2019-12-16 PROCEDURE — 80061 LIPID PANEL: CPT

## 2019-12-16 RX ORDER — MELOXICAM 15 MG/1
15 TABLET ORAL DAILY PRN
COMMUNITY
End: 2021-04-12

## 2019-12-16 RX ORDER — NAPROXEN 500 MG/1
500 TABLET ORAL 2 TIMES DAILY
COMMUNITY
End: 2021-04-12

## 2019-12-16 NOTE — PROGRESS NOTES
[facility] Note    Chief Complaint      Chief Complaint   Patient presents with    Annual Exam     History of Present Illness      Theo Matthew is a 49 y.o. male who presents today for annual preventative exam.  Patient comes to appointment alone.    Problem List Items Addressed This Visit     Annual physical exam - Primary    Current Assessment & Plan     Nonsmoker, doesn't always eat healthy.  Doesn't exercise.  Comes to appt alone.         Relevant Orders    CBC auto differential    Comprehensive metabolic panel    Obesity (BMI 30-39.9)    Primary osteoarthritis of both knees    Current Assessment & Plan     Sees Dr. Fox, had ACL repair 19 years ago.  Has had cortisone injections in both knees which helped.  Left knee is worse.  Stable on NSAIDS.         Mixed hyperlipidemia    Current Assessment & Plan      in 2018, no family hx of stroke/MI.  Mom has HTN.         Relevant Orders    Lipid panel      Other Visit Diagnoses     Screening for colon cancer        Relevant Orders    Case request GI: COLONOSCOPY (Completed)          Health Maintenance   Topic Date Due    Colonoscopy  05/31/2019    Lipid Panel  03/16/2023    TETANUS VACCINE  08/22/2026       Past Medical History:   Diagnosis Date    Internal hemorrhoid        Past Surgical History:   Procedure Laterality Date    KNEE LIGAMENT RECONSTRUCTION Right        family history includes Alcohol abuse in his father; Arthritis in his paternal grandmother; Cancer in his maternal grandmother; Diabetes in his maternal grandfather; Hypertension in his mother; No Known Problems in his paternal grandfather and sister; Seizures in his father.     Social History     Tobacco Use    Smoking status: Never Smoker   Substance Use Topics    Alcohol use: No     Frequency: Never     Binge frequency: Never    Drug use: No       Review of Systems   Constitutional: Negative for chills and fever.   HENT: Negative for congestion, hearing  "loss and sore throat.    Eyes: Negative for blurred vision and discharge.   Respiratory: Negative for cough, shortness of breath and wheezing.    Cardiovascular: Negative for chest pain and palpitations.   Gastrointestinal: Negative for blood in stool, constipation, diarrhea, nausea and vomiting.   Genitourinary: Positive for urgency. Negative for dysuria and hematuria.   Musculoskeletal: Negative for falls, myalgias and neck pain.   Skin: Negative for itching and rash.   Neurological: Negative for dizziness, weakness and headaches.   Endo/Heme/Allergies: Negative for polydipsia.        Outpatient Encounter Medications as of 12/16/2019   Medication Sig Dispense Refill    meloxicam (MOBIC) 15 MG tablet Take 15 mg by mouth daily as needed for Pain.      naproxen (NAPROSYN) 500 MG tablet Take 500 mg by mouth 2 (two) times daily.       No facility-administered encounter medications on file as of 12/16/2019.        Review of patient's allergies indicates:  No Known Allergies    Physical Exam      Vital Signs  Temp: 98.8 °F (37.1 °C)  Temp src: Oral  Pulse: 65  SpO2: 96 %  BP: 132/82  BP Location: Left arm  Patient Position: Sitting  Pain Score: 0-No pain  Height and Weight  Height: 5' 6" (167.6 cm)  Weight: 84.9 kg (187 lb 4.5 oz)  BSA (Calculated - sq m): 1.99 sq meters  BMI (Calculated): 30.2  Weight in (lb) to have BMI = 25: 154.6]    Physical Exam   Constitutional: He is oriented to person, place, and time. He appears well-developed and well-nourished.   HENT:   Head: Normocephalic and atraumatic.   Right Ear: External ear normal.   Left Ear: External ear normal.   Eyes: Right eye exhibits no discharge. Left eye exhibits no discharge.   Neck: Normal range of motion. No thyromegaly present.   Cardiovascular: Normal rate, regular rhythm and intact distal pulses.   No murmur heard.  Pulmonary/Chest: Effort normal and breath sounds normal. No respiratory distress.   Abdominal: Soft. Bowel sounds are normal. He exhibits " no distension. There is no tenderness.   Musculoskeletal: Normal range of motion. He exhibits no deformity.   Neurological: He is alert and oriented to person, place, and time.   Skin: Skin is warm and dry. No rash noted.   Psychiatric: He has a normal mood and affect. His behavior is normal.        Laboratory:  CBC:  No results for input(s): WBC, RBC, HGB, HCT, PLT, MCV, MCH, MCHC in the last 2160 hours.  CMP:  No results for input(s): GLU, CALCIUM, ALBUMIN, PROT, NA, K, CO2, CL, BUN, ALKPHOS, ALT, AST, BILITOT in the last 2160 hours.    Invalid input(s): CREATININ  URINALYSIS:  No results for input(s): COLORU, CLARITYU, SPECGRAV, PHUR, PROTEINUA, GLUCOSEU, BILIRUBINCON, BLOODU, WBCU, RBCU, BACTERIA, MUCUS, NITRITE, LEUKOCYTESUR, UROBILINOGEN, HYALINECASTS in the last 2160 hours.   LIPIDS:  No results for input(s): TSH, HDL, CHOL, TRIG, LDLCALC, CHOLHDL, NONHDLCHOL, TOTALCHOLEST in the last 2160 hours.  TSH:  No results for input(s): TSH in the last 2160 hours.  A1C:  No results for input(s): HGBA1C in the last 2160 hours.    Radiology:  No new imaging    Assessment/Plan     Theo Matthew is a 49 y.o.male with:    1. Annual physical exam  - CBC auto differential  - Comprehensive metabolic panel    2. Obesity (BMI 30-39.9)    3. Primary osteoarthritis of both knees    4. Mixed hyperlipidemia  - Lipid panel    5. Screening for colon cancer  - Case request GI: COLONOSCOPY    -labs today  -refuses flu and shingrix vaccine  -refer to Dr. Israel for colonoscopy  -Return to clinic in 1 year       Tenisha Becerra MD  [facility] [place]    Answers for HPI/ROS submitted by the patient on 12/15/2019   activity change: No  unexpected weight change: No  rhinorrhea: No  trouble swallowing: No  visual disturbance: Yes  chest tightness: No  polyuria: No  difficulty urinating: No  joint swelling: Yes  arthralgias: Yes  confusion: No  dysphoric mood: No

## 2019-12-16 NOTE — ASSESSMENT & PLAN NOTE
Sees Dr. Fox, had ACL repair 19 years ago.  Has had cortisone injections in both knees which helped.  Left knee is worse.  Stable on NSAIDS.

## 2019-12-17 NOTE — PROGRESS NOTES
Labs look pretty good. Please let patient know that their cholesterol is mildly elevated but has improved from labs last year.  Given lack of other risk factors for stroke/heart attack, no need for medication at this time.  Patient just needs to work on low fat/healthy diet.

## 2020-01-03 ENCOUNTER — PATIENT MESSAGE (OUTPATIENT)
Dept: FAMILY MEDICINE | Facility: CLINIC | Age: 50
End: 2020-01-03

## 2020-01-30 ENCOUNTER — OFFICE VISIT (OUTPATIENT)
Dept: FAMILY MEDICINE | Facility: CLINIC | Age: 50
End: 2020-01-30
Payer: COMMERCIAL

## 2020-01-30 ENCOUNTER — TELEPHONE (OUTPATIENT)
Dept: FAMILY MEDICINE | Facility: CLINIC | Age: 50
End: 2020-01-30

## 2020-01-30 VITALS
TEMPERATURE: 99 F | WEIGHT: 183.44 LBS | HEIGHT: 66 IN | DIASTOLIC BLOOD PRESSURE: 88 MMHG | HEART RATE: 86 BPM | SYSTOLIC BLOOD PRESSURE: 122 MMHG | BODY MASS INDEX: 29.48 KG/M2 | OXYGEN SATURATION: 96 %

## 2020-01-30 DIAGNOSIS — J10.1 INFLUENZA A: Primary | ICD-10-CM

## 2020-01-30 LAB
CTP QC/QA: YES
FLUAV AG NPH QL: POSITIVE
FLUBV AG NPH QL: NEGATIVE

## 2020-01-30 PROCEDURE — 87804 INFLUENZA ASSAY W/OPTIC: CPT | Mod: QW,S$GLB,, | Performed by: NURSE PRACTITIONER

## 2020-01-30 PROCEDURE — 3008F PR BODY MASS INDEX (BMI) DOCUMENTED: ICD-10-PCS | Mod: CPTII,S$GLB,, | Performed by: NURSE PRACTITIONER

## 2020-01-30 PROCEDURE — 99999 PR PBB SHADOW E&M-EST. PATIENT-LVL IV: ICD-10-PCS | Mod: PBBFAC,,, | Performed by: NURSE PRACTITIONER

## 2020-01-30 PROCEDURE — 99213 OFFICE O/P EST LOW 20 MIN: CPT | Mod: 25,S$GLB,, | Performed by: NURSE PRACTITIONER

## 2020-01-30 PROCEDURE — 3008F BODY MASS INDEX DOCD: CPT | Mod: CPTII,S$GLB,, | Performed by: NURSE PRACTITIONER

## 2020-01-30 PROCEDURE — 87804 POCT INFLUENZA A/B: ICD-10-PCS | Mod: QW,S$GLB,, | Performed by: NURSE PRACTITIONER

## 2020-01-30 PROCEDURE — 99213 PR OFFICE/OUTPT VISIT, EST, LEVL III, 20-29 MIN: ICD-10-PCS | Mod: 25,S$GLB,, | Performed by: NURSE PRACTITIONER

## 2020-01-30 PROCEDURE — 99999 PR PBB SHADOW E&M-EST. PATIENT-LVL IV: CPT | Mod: PBBFAC,,, | Performed by: NURSE PRACTITIONER

## 2020-01-30 NOTE — TELEPHONE ENCOUNTER
----- Message from Ronald Purdy sent at 1/30/2020 10:21 AM CST -----  Type:  Same Day Appointment Request    Caller is requesting a same day appointment.  Caller declined first available appointment listed below.    Name of Caller: pt   When is the first available appointment? 02/03   Symptoms: cold , congestion , and body aches   Best Call Back Number: [phone]   Additional Information:  Pt open to seeing a different doctor if possible

## 2020-01-30 NOTE — PROGRESS NOTES
Subjective:       Patient ID: Theo Matthew is a 49 y.o. male.    Chief Complaint: Chest Congestion    50 y/o male presents to clinic for urgent care visit with c/o cough and chest congestion.     Cough   This is a new problem. Episode onset: 3 days ago. The problem has been gradually improving. The cough is non-productive. Associated symptoms include myalgias, nasal congestion and rhinorrhea. Pertinent negatives include no chest pain, ear congestion, fever, headaches, sore throat or wheezing. Nothing aggravates the symptoms. Treatments tried: DayQuil. The treatment provided mild relief. There is no history of asthma, environmental allergies or pneumonia.       Current Outpatient Medications   Medication Sig Dispense Refill    meloxicam (MOBIC) 15 MG tablet Take 15 mg by mouth daily as needed for Pain.      naproxen (NAPROSYN) 500 MG tablet Take 500 mg by mouth 2 (two) times daily.       No current facility-administered medications for this visit.        Past Medical History:   Diagnosis Date    Internal hemorrhoid        Past Surgical History:   Procedure Laterality Date    KNEE LIGAMENT RECONSTRUCTION Right        Family History   Problem Relation Age of Onset    Seizures Father     Alcohol abuse Father     Hypertension Mother     No Known Problems Sister     Cancer Maternal Grandmother         passed 83 pancreate cancer    Diabetes Maternal Grandfather     Arthritis Paternal Grandmother     No Known Problems Paternal Grandfather        Social History     Socioeconomic History    Marital status:      Spouse name: Not on file    Number of children: Not on file    Years of education: Not on file    Highest education level: Not on file   Occupational History    Occupation: baggage claims   Social Needs    Financial resource strain: Not hard at all    Food insecurity:     Worry: Never true     Inability: Never true    Transportation needs:     Medical: No     Non-medical: No   Tobacco Use     "Smoking status: Never Smoker   Substance and Sexual Activity    Alcohol use: No     Frequency: Never     Binge frequency: Never    Drug use: No    Sexual activity: Yes     Partners: Female     Birth control/protection: None   Lifestyle    Physical activity:     Days per week: 1 day     Minutes per session: 20 min    Stress: Only a little   Relationships    Social connections:     Talks on phone: More than three times a week     Gets together: Three times a week     Attends Confucianism service: Not on file     Active member of club or organization: Yes     Attends meetings of clubs or organizations: More than 4 times per year     Relationship status:    Other Topics Concern    Not on file   Social History Narrative    Not on file       Review of Systems   Constitutional: Negative for fatigue, fever and unexpected weight change.   HENT: Positive for congestion and rhinorrhea. Negative for sore throat.    Respiratory: Positive for cough. Negative for wheezing.    Cardiovascular: Negative for chest pain.   Gastrointestinal: Negative for abdominal pain.   Musculoskeletal: Positive for myalgias.   Allergic/Immunologic: Negative for environmental allergies and immunocompromised state.   Neurological: Negative for headaches.   Hematological: Negative for adenopathy. Does not bruise/bleed easily.         Objective:     Vitals:    01/30/20 1400   BP: 122/88   Pulse: 86   Temp: 98.8 °F (37.1 °C)   TempSrc: Oral   SpO2: 96%   Weight: 83.2 kg (183 lb 6.8 oz)   Height: 5' 6" (1.676 m)          Physical Exam   Constitutional: He is oriented to person, place, and time. He appears well-developed and well-nourished. No distress.   HENT:   Head: Normocephalic.   Right Ear: Tympanic membrane and ear canal normal.   Left Ear: Tympanic membrane and ear canal normal.   Nose: Rhinorrhea present.   Mouth/Throat: Uvula is midline. Posterior oropharyngeal erythema present. No oropharyngeal exudate or posterior oropharyngeal " edema.   Eyes: Pupils are equal, round, and reactive to light. Conjunctivae are normal.   Neck: Normal range of motion. Neck supple.   Cardiovascular: Normal rate and regular rhythm.   Pulmonary/Chest: Effort normal and breath sounds normal.   Musculoskeletal: Normal range of motion.   Lymphadenopathy:     He has no cervical adenopathy.   Neurological: He is alert and oriented to person, place, and time.   Skin: Skin is warm and dry.   Psychiatric: He has a normal mood and affect. His behavior is normal.         Assessment:         ICD-10-CM ICD-9-CM   1. Influenza A J10.1 487.1       Plan:       Influenza A  -     Rapid influenza test positive for Influenza A. Advise expectant management with saline nasal rinses, Tylenol or NSAIDS as needed for pain or fever, Mucinex DM as needed for cough, and pseudoephedrine +/-antihistamine as needed for congestion. Throat lozenges and warm salt water gargles as needed for sore throat.  Expect symptom resolution to be between 7-10 days from time of onset of symptoms.  If symptoms last beyond this timeframe or patient develops worsening headaches, purulent nasal discharge, and fever, call or return to the office for further recommendations.    -     POCT Influenza A/B      Follow up if symptoms worsen or fail to improve.     Patient's Medications   New Prescriptions    No medications on file   Previous Medications    MELOXICAM (MOBIC) 15 MG TABLET    Take 15 mg by mouth daily as needed for Pain.    NAPROXEN (NAPROSYN) 500 MG TABLET    Take 500 mg by mouth 2 (two) times daily.   Modified Medications    No medications on file   Discontinued Medications    No medications on file

## 2020-01-30 NOTE — TELEPHONE ENCOUNTER
Advised patient Dr. Becerra did not have any appointments today. Scheduled patient to see Moon TIPTON today at 2:00

## 2020-01-30 NOTE — LETTER
January 30, 2020      [address], [address]  [facility] [address]  Phone: [phone]  Fax: [phone]       Patient: Theo Matthew   YOB: 1970  Date of Visit: 01/30/2020    To Whom It May Concern:    Aleksandra Matthew  was at [facility] on 01/30/2020. Please excuse him from 01/28/2020 to 02/02/2020. He may return to work/school on 2/05/2020  with no restrictions. If you have any questions or concerns, or if I can be of further assistance, please do not hesitate to contact me.    Sincerely,    ANASTASIA Dominguez

## 2020-01-30 NOTE — PATIENT INSTRUCTIONS

## 2020-02-06 ENCOUNTER — TELEPHONE (OUTPATIENT)
Dept: GASTROENTEROLOGY | Facility: CLINIC | Age: 50
End: 2020-02-06

## 2020-02-06 NOTE — TELEPHONE ENCOUNTER
Colonoscopy Referral   Referring Physician: Dr. Tenisha Becerra  Date:   Reason for Referral:  Colon Cancer Screening    Family History of:  NO  Colon polyp:    Relationship/Age of Onset:N/A    Colon cancer:   Relationship/Age of Onset: N/A   Hemoccults Done: NO     Iron deficient: NO    On Blood Thinner: NO    Valvular heart disease/valve replacement:  NO    Anemia Present: NO    On NSAID: No  Lung disease: NO  Kidney disease: NO    Hx of polyps: No  Hx of colon cancer: NO    Previous colon evalations: NO  When: N/A  Where: N/A  Pertinent symptoms: NONE    Patient was scheduled for colonoscopy on 02/18/2020 with Dr. Jacques at [facility]. Suprep instructions were reviewed with patient.  Suprep called in to pharmacy on file.

## 2020-02-14 ENCOUNTER — TELEPHONE (OUTPATIENT)
Dept: ENDOSCOPY | Facility: HOSPITAL | Age: 50
End: 2020-02-14

## 2020-02-14 NOTE — TELEPHONE ENCOUNTER
Spoke with patient about arrival time @. 930    Prep instructions reviewed: the day before the procedure, follow a clear liquid diet all day, then start the first 1/2 of prep at 5pm and take 2nd 1/2 of prep @. 400 Pt must be completely NPO when prep completed @.  600            Medications: Do not take Insulin or oral diabetic medications the day of the procedure.  Take as prescribed: heart, seizure and blood pressure medication in the morning with a sip of water (less than an ounce).  Take any breathing medications and bring inhalers to hospital with you Leave all valuables and jewelry at home.     Wear comfortable clothes to procedure to change into hospital gown You cannot drive for 24 hours after your procedure because you will receive sedation for your procedure to make you comfortable.  A ride must be provided at discharge.

## 2020-02-18 ENCOUNTER — ANESTHESIA EVENT (OUTPATIENT)
Dept: ENDOSCOPY | Facility: HOSPITAL | Age: 50
End: 2020-02-18
Payer: COMMERCIAL

## 2020-02-18 ENCOUNTER — ANESTHESIA (OUTPATIENT)
Dept: ENDOSCOPY | Facility: HOSPITAL | Age: 50
End: 2020-02-18
Payer: COMMERCIAL

## 2020-02-18 ENCOUNTER — HOSPITAL ENCOUNTER (OUTPATIENT)
Facility: HOSPITAL | Age: 50
Discharge: HOME | End: 2020-02-18
Attending: INTERNAL MEDICINE | Admitting: INTERNAL MEDICINE
Payer: COMMERCIAL

## 2020-02-18 VITALS
OXYGEN SATURATION: 100 % | HEART RATE: 80 BPM | RESPIRATION RATE: 20 BRPM | SYSTOLIC BLOOD PRESSURE: 120 MMHG | HEIGHT: 66 IN | TEMPERATURE: 98 F | DIASTOLIC BLOOD PRESSURE: 78 MMHG | WEIGHT: 180 LBS | BODY MASS INDEX: 28.93 KG/M2

## 2020-02-18 DIAGNOSIS — Z12.11 SCREENING FOR MALIGNANT NEOPLASM OF COLON: ICD-10-CM

## 2020-02-18 PROCEDURE — G0121 COLON CA SCRN NOT HI RSK IND: HCPCS | Performed by: INTERNAL MEDICINE

## 2020-02-18 PROCEDURE — G0121 COLON CA SCRN NOT HI RSK IND: HCPCS | Mod: ,,, | Performed by: INTERNAL MEDICINE

## 2020-02-18 PROCEDURE — 25000003 PHARM REV CODE 250: Performed by: INTERNAL MEDICINE

## 2020-02-18 PROCEDURE — G0121 COLON CA SCRN NOT HI RSK IND: ICD-10-PCS | Mod: ,,, | Performed by: INTERNAL MEDICINE

## 2020-02-18 PROCEDURE — 37000009 HC ANESTHESIA EA ADD 15 MINS: Performed by: INTERNAL MEDICINE

## 2020-02-18 PROCEDURE — 63600175 PHARM REV CODE 636 W HCPCS: Performed by: INTERNAL MEDICINE

## 2020-02-18 PROCEDURE — 63600175 PHARM REV CODE 636 W HCPCS: Performed by: NURSE ANESTHETIST, CERTIFIED REGISTERED

## 2020-02-18 PROCEDURE — 37000008 HC ANESTHESIA 1ST 15 MINUTES: Performed by: INTERNAL MEDICINE

## 2020-02-18 RX ORDER — SODIUM CHLORIDE 9 MG/ML
INJECTION, SOLUTION INTRAVENOUS CONTINUOUS
Status: DISCONTINUED | OUTPATIENT
Start: 2020-02-18 | End: 2021-04-12

## 2020-02-18 RX ORDER — PROPOFOL 10 MG/ML
VIAL (ML) INTRAVENOUS
Status: DISCONTINUED | OUTPATIENT
Start: 2020-02-18 | End: 2020-02-18

## 2020-02-18 RX ORDER — LIDOCAINE HCL/PF 100 MG/5ML
SYRINGE (ML) INTRAVENOUS
Status: DISCONTINUED | OUTPATIENT
Start: 2020-02-18 | End: 2020-02-18

## 2020-02-18 RX ORDER — SODIUM CHLORIDE 0.9 % (FLUSH) 0.9 %
10 SYRINGE (ML) INJECTION
Status: DISCONTINUED | OUTPATIENT
Start: 2020-02-18 | End: 2021-04-12

## 2020-02-18 RX ORDER — DEXTROMETHORPHAN/PSEUDOEPHED 2.5-7.5/.8
DROPS ORAL
Status: COMPLETED | OUTPATIENT
Start: 2020-02-18 | End: 2020-02-18

## 2020-02-18 RX ORDER — PROPOFOL 10 MG/ML
VIAL (ML) INTRAVENOUS CONTINUOUS PRN
Status: DISCONTINUED | OUTPATIENT
Start: 2020-02-18 | End: 2020-02-18

## 2020-02-18 RX ADMIN — PROPOFOL 70 MG: 10 INJECTION, EMULSION INTRAVENOUS at 10:02

## 2020-02-18 RX ADMIN — SIMETHICONE 66.7 MG: 20 SUSPENSION/ DROPS ORAL at 10:02

## 2020-02-18 RX ADMIN — PROPOFOL 30 MG: 10 INJECTION, EMULSION INTRAVENOUS at 10:02

## 2020-02-18 RX ADMIN — SODIUM CHLORIDE: 0.9 INJECTION, SOLUTION INTRAVENOUS at 10:02

## 2020-02-18 RX ADMIN — LIDOCAINE HYDROCHLORIDE 50 MG: 20 INJECTION, SOLUTION INTRAVENOUS at 10:02

## 2020-02-18 RX ADMIN — PROPOFOL 150 MCG/KG/MIN: 10 INJECTION, EMULSION INTRAVENOUS at 10:02

## 2020-02-18 NOTE — H&P
[facility]  [address], [address] [place] [address]  Phone:  [phone] - Fax:  [phone]   [facility] Progress Report and Disability Certification  Date of Service: 3/1/2018   No Show:  No  Date / Time of Next Visit:     Claim Information   Patient Name: Simon Simmons  Claim Number:     Employer: [employer]  Date of Injury: 3/1/2018     Insurer / TPA: [employer]  ID / SSN:     Occupation: maintanence tech  Diagnosis: The encounter diagnosis was Laceration of left middle finger without foreign body without damage to nail, initial encounter.    Medical Information   Related to Industrial Injury? Yes    Subjective Complaints:  Date of injury: 03/01/2018. Injured at work: yes; accidentally cut left middle finger with knife. Denies additional injury, tetanus up-to-date. Previous injury: none. Second job: none. Outside activity: none. Contributing medical illness: none. Prior treatment: none. Location: left middle finger. Numbness/tingling: none. Focal weakness: none.   Objective Findings: Gen.: Alert, cooperative, no acute distress. Musculoskeletal: Left long finger-normal range of motion, no tendon function deficit. Vascular: Distal capillary refill intact. Neurological: Distal diffuse light touch sensation intact. Skin: Warm, dry without rash. 2 cm laceration left long finger radial PIP joint region. No foreign body, no tendon injury.   Pre-Existing Condition(s):     Assessment:   Initial Visit    Status: Additional Care Required  Permanent Disability:No    Plan:      Diagnostics:      Comments:       Disability Information   Status: Released to Restricted Duty    From:     Through:   Restrictions are:     Physical Restrictions   Sitting:    Standing:    Stooping:    Bending:      Squatting:    Walking:    Climbing:    Pushing:      Pulling:    Other:    Comments:must keep left long finger wound site dressed, clean and dry. Reaching Above Shoulder (L):  [facility]  Gastroenterology  H&P    Patient Name: Theo Matthew Jr.  MRN: [id number]  Admission Date: 2/18/2020  Code Status: Full Code    Attending Provider: Agueda Jacques MD   Primary Care Physician: Tenisha Becerra MD  Principal Problem:<principal problem not specified>    Subjective:     History of Present Illness: Colon cancer screening    Past Medical History:   Diagnosis Date    Internal hemorrhoid        Past Surgical History:   Procedure Laterality Date    KNEE LIGAMENT RECONSTRUCTION Right        Review of patient's allergies indicates:  No Known Allergies  Family History     Problem Relation (Age of Onset)    Alcohol abuse Father    Arthritis Paternal Grandmother    Cancer Maternal Grandmother    Diabetes Maternal Grandfather    Hypertension Mother    No Known Problems Sister, Paternal Grandfather    Seizures Father        Tobacco Use    Smoking status: Never Smoker    Smokeless tobacco: Never Used   Substance and Sexual Activity    Alcohol use: No     Frequency: Never     Binge frequency: Never    Drug use: No    Sexual activity: Yes     Partners: Female     Birth control/protection: None     Review of Systems   Constitutional: Negative for appetite change and unexpected weight change.   Respiratory: Negative for shortness of breath and wheezing.    Cardiovascular: Negative for chest pain and palpitations.   Gastrointestinal: Negative for abdominal distention and abdominal pain.     Objective:     Vital Signs (Most Recent):  Temp: 98.1 °F (36.7 °C) (02/18/20 0959)  Pulse: 78 (02/18/20 0959)  Resp: 18 (02/18/20 0959)  BP: 124/88 (02/18/20 0959)  SpO2: 99 % (02/18/20 0959) Vital Signs (24h Range):  Temp:  [98.1 °F (36.7 °C)] 98.1 °F (36.7 °C)  Pulse:  [78] 78  Resp:  [18] 18  SpO2:  [99 %] 99 %  BP: (124)/(88) 124/88     Weight: 81.6 kg (180 lb) (02/18/20 0959)  Body mass index is 29.05 kg/m².    No intake or output data in the 24 hours ending 02/18/20    Reaching Above Shoulder (R):       Reaching Below Shoulder (L):    Reaching Below Shoulder (R):      Not to exceed Weight Limits   Carrying(hrs):   Weight Limit(lb):   Lifting(hrs):   Weight  Limit(lb):     Comments:      Repetitive Actions   Hands: i.e. Fine Manipulations from Grasping:     Feet: i.e. Operating Foot Controls:     Driving / Operate Machinery:     Physician Name: Quinton Fung M.D. Physician Signature: QUINTON Beckman M.D. e-Signature: Dr. Gabino Lucas, Medical Director   Clinic Name / Location: [address], [address]  [place] [address] Clinic Phone Number: Dept: [phone]   Appointment Time: 2:30 Pm Visit Start Time: 2:57 PM   Check-In Time:  2:34 Pm Visit Discharge Time:     Original-Treating Physician or Chiropractor    Page 2-Insurer/TPA    Page 3-Employer    Page 4-Employee     1001    Lines/Drains/Airways     Peripheral Intravenous Line                 Peripheral IV - Single Lumen 02/18/20 0958 20 G Right Hand less than 1 day                Physical Exam   Constitutional: He is oriented to person, place, and time. He appears well-developed and well-nourished. No distress.   HENT:   Head: Normocephalic.   Eyes: Conjunctivae are normal. No scleral icterus.   Neck: No tracheal deviation present. No thyromegaly present.   Cardiovascular: Normal rate, regular rhythm and normal heart sounds. Exam reveals no gallop and no friction rub.   No murmur heard.  Pulmonary/Chest: Effort normal. No respiratory distress. He has no rales.   Abdominal: Soft. Bowel sounds are normal. He exhibits no distension. There is no tenderness. There is no rebound and no guarding.   Musculoskeletal: Normal range of motion. He exhibits no edema or tenderness.   Neurological: He is alert and oriented to person, place, and time.   Skin: He is not diaphoretic.   Psychiatric: He has a normal mood and affect. His behavior is normal.   Vitals reviewed.          Assessment/Plan:     Active Diagnoses:    Diagnosis Date Noted POA    Screening for malignant neoplasm of colon [Z12.11] 02/18/2020 Not Applicable      Problems Resolved During this Admission:     Plan  1. Colonoscopy    Agueda Jacques MD  Gastroenterology  [facility]

## 2020-02-18 NOTE — ANESTHESIA PREPROCEDURE EVALUATION
02/18/2020  Theo Matthew Jr. is a 49 y.o., male.    Anesthesia Evaluation    I have reviewed the Patient Summary Reports.     I have reviewed the Medications.     Review of Systems  Social:  Non-Smoker, No Alcohol Use    Cardiovascular:  Cardiovascular Normal Exercise tolerance: good     Pulmonary:   Recent URI    Renal/:  Renal/ Normal     Hepatic/GI:  Hepatic/GI Normal    Musculoskeletal:   OA in both knees       Physical Exam  General:  Well nourished    Airway/Jaw/Neck:  Airway Findings: Mouth Opening: Normal Tongue: Normal  General Airway Assessment: Adult  Mallampati: II  Improves to I with phonation.  TM Distance: Normal, at least 6 cm      Dental:  Dental Findings: In tact   Chest/Lungs:  Chest/Lungs Findings: Clear to auscultation     Heart/Vascular:  Heart Findings: Rate: Normal        Mental Status:  Mental Status Findings:  Cooperative         Anesthesia Plan  Type of Anesthesia, risks & benefits discussed:  Anesthesia Type:  general, MAC  Patient's Preference:   Intra-op Monitoring Plan:   Intra-op Monitoring Plan Comments:   Post Op Pain Control Plan:   Post Op Pain Control Plan Comments:   Induction:    Beta Blocker:  Patient is not currently on a Beta-Blocker (No further documentation required).       Informed Consent: Patient understands risks and agrees with Anesthesia plan.  Questions answered. Anesthesia consent signed with patient.  ASA Score: 2     Day of Surgery Review of History & Physical: I have interviewed and examined the patient. I have reviewed the patient's H&P dated:            Ready For Surgery From Anesthesia Perspective.

## 2020-02-18 NOTE — PLAN OF CARE
Past Medical History:   Diagnosis Date    Internal hemorrhoid      Recovery complete. Patient recovered to baseline. Discharge instructions reviewed with patient. VSS.

## 2020-02-18 NOTE — PLAN OF CARE
Dr. Jacques visited at bedside, discussed findings and recommendations with patient and family member; all questions asked and answered. Verbalized understanding of information given. Handout provided at time of discharge.

## 2020-02-18 NOTE — ANESTHESIA POSTPROCEDURE EVALUATION
Anesthesia Post Evaluation    Patient: Theo Matthew JrJose D    Procedure(s) Performed: Procedure(s) (LRB):  COLONOSCOPY/Suprep (N/A)    Final Anesthesia Type: MAC    Patient location during evaluation: GI PACU  Patient participation: Yes- Able to Participate  Level of consciousness: awake and alert  Post-procedure vital signs: reviewed and stable  Pain management: adequate  Airway patency: patent    PONV status at discharge: No PONV  Anesthetic complications: no      Cardiovascular status: hemodynamically stable  Respiratory status: unassisted, spontaneous ventilation and room air  Hydration status: euvolemic  Follow-up not needed.          Vitals Value Taken Time   /88 2/18/2020  9:59 AM   Temp 36.7 °C (98.1 °F) 2/18/2020  9:59 AM   Pulse 78 2/18/2020  9:59 AM   Resp 18 2/18/2020  9:59 AM   SpO2 99 % 2/18/2020  9:59 AM         No case tracking events are documented in the log.      Pain/Davin Score: No data recorded

## 2020-02-18 NOTE — TRANSFER OF CARE
"Anesthesia Transfer of Care Note    Patient: Theo Matthew Jr.    Procedure(s) Performed: Procedure(s) (LRB):  COLONOSCOPY/Suprep (N/A)    Patient location: GI    Anesthesia Type: MAC    Transport from OR: Transported from OR on room air with adequate spontaneous ventilation    Post pain: adequate analgesia    Post assessment: no apparent anesthetic complications and tolerated procedure well    Post vital signs: stable    Level of consciousness: responds to stimulation and sedated    Nausea/Vomiting: no nausea/vomiting    Complications: none    Transfer of care protocol was followed      Last vitals:   Visit Vitals  /88 (BP Location: Left arm, Patient Position: Lying)   Pulse 78   Temp 36.7 °C (98.1 °F) (Temporal)   Resp 18   Ht 5' 6" (1.676 m)   Wt 81.6 kg (180 lb)   SpO2 99%   BMI 29.05 kg/m²     "

## 2020-02-18 NOTE — DISCHARGE INSTRUCTIONS
Eating a High-Fiber Diet  Fiber is what gives strength and structure to plants. Most grains, beans, vegetables, and fruits contain fiber. Foods rich in fiber are often low in calories and fat, and they fill you up more. They may also reduce your risks for certain health problems. To find out the amount of fiber in canned, packaged, or frozen foods, read the Nutrition Facts label. It tells you how much fiber is in a serving.    Types of fiber and their benefits  There are two types of fiber: insoluble and soluble. They both aid digestion and help you maintain a healthy weight.  · Insoluble fiber. This is found in whole grains, cereals, certain fruits and vegetables such as apple skin, corn, and carrots. Insoluble fiber may prevent constipation and reduce the risk for certain types of cancer.  · Soluble fiber. This type of fiber is in oats, beans, and certain fruits and vegetables such as strawberries and peas. Soluble fiber can reduce cholesterol, which may help lower the risk for heart disease. It also helps control blood sugar levels.  Look for high-fiber foods  Try these foods to add fiber to your diet:  · Whole-grain breads and cereals. Try to eat 6 to 8 ounces a day. Include wheat and oat bran cereals, whole-wheat muffins or toast, and corn tortillas in your meals.  · Fruits. Try to eat 2 cups a day. Apples, oranges, strawberries, pears, and bananas are good sources. (Note: Fruit juice is low in fiber.)  · Vegetables. Try to eat at least 2.5 cups a day. Add asparagus, carrots, broccoli, peas, and corn to your meals.  · Beans. One cup of cooked lentils gives you over 15 grams of fiber. Try navy beans, lentils, and chickpeas.  · Seeds. A small handful of seeds gives you about 3 grams of fiber. Try sunflower seeds.  Keep track of your fiber  Keep track of how much fiber you eat. Start by reading food labels. Then eat a variety of foods high in fiber. As you begin to eat more fiber, ask your healthcare provider  how much water you should be drinking to keep your digestive system working smoothly.  You should aim for a certain amount of fiber in your diet each day. If you are a woman, that amount is between 25 and 28 grams per day. Men should aim for 30 to 33 grams per day. After age 50, your daily fiber needs drop to 22 grams for women and 28 grams for men.  Before you reach for the fiber supplements, think about this. Fiber is found naturally in healthy whole foods. It gives you that feeling of fullness after you eat. Taking fiber supplements or eating fiber-enriched foods will not give you this full feeling.  Your fiber intake is a good measure for the quality of your overall diet. If you are missing out on your daily amount of fiber, you may be lacking other important nutrients as well.  Date Last Reviewed: 5/11/2015 © 2000-2017 [employer]. [address] [place] [address]. All rights reserved. This information is not intended as a substitute for professional medical care. Always follow your healthcare professional's instructions.        Colorectal Cancer Screening    Colorectal cancer (cancer in the colon or rectum) is a leading cause of cancer deaths in the [place] But it doesnt have to be. When this cancer is found and removed early, the chances of a full recovery are very good. Because colorectal cancer rarely causes symptoms in its early stages, screening for the disease is important. Its even more crucial if you have risk factors for the disease. Learn more about colorectal cancer and its risk factors. Then talk to your healthcare provider about being screened. You could be saving your own life.  Risk factors for colorectal cancer  Your risk of having colorectal cancer increases if you:  · Are 50 years of age or older  · Have a family history or personal history of colorectal cancer or polyps  · Have a personal history of type 2 diabetes, Crohns disease, or ulcerative colitis  · Have an  inherited genetic syndrome like Lopez syndrome (also known as HNPCC) or familial adenomatous polyposis (FAP)  · Are very overweight  · Are not physically active  · Smoke  · Drink a lot of alcohol  · Eat a lot of red or processed meat  The colon and rectum  Waste from food you eat enters the colon from the small intestine. As it travels through the colon, the waste (stool) loses water and becomes more solid. Intestinal muscles push it toward the sigmoid--the last section of the colon. Stool then moves into the rectum, where its stored until its ready to leave the body during a bowel movement.  How cancer develops  Polyps are growths that form on the inner lining of the colon or rectum. Most are benign, which means they arent cancerous. But over time, some polyps can become cancer (malignant). This happens when cells in these polyps begin growing abnormally. In time, malignant cells invade more and more of the colon and rectum. The cancer may also spread to nearby organs or lymph nodes or to other parts of the body. Finding and removing polyps can help prevent cancer from ever forming.  Your screening  Screening means looking for a health problem before you have symptoms. During screening for colorectal cancer, your healthcare provider will ask about your health history, examine you, and do one or more tests.  History and exam  The history and exam involve the following:  · Health history. Your healthcare provider will ask about your health history. Mention if a family member has had colon cancer or polyps. Also mention any health problems you have had in the past.  · Digital rectal exam (YINA). During a YINA, the healthcare provider inserts a lubricated gloved finger into the rectum. The test is painless and takes less than a minute. Healthcare providers agree that this test alone is not enough to screen for colorectal cancer.  Screening test choices  Fecal occult blood test (FOBT) or fecal immunochemical test  (FIT)  These tests check for occult blood in stool (blood you cant see). Hidden blood may be a sign of colon polyps or cancer. A small sample of stool is tested for blood in a laboratory. Most often, you collect this sample at home using a kit your healthcare provider gives you. Follow the instructions carefully for using this kit. You might need to avoid certain foods and medicines before the test, as directed.  Barium enema with contrast (double-contrast barium enema)  This test uses X-rays to provide images of the entire colon and rectum. The day before this test, you will need to do a bowel prep to clean out the colon and rectum. A bowel prep is a liquid diet plus strong laxatives or enemas. You will be awake for the test, but you may be given medicine to help you relax. At the start of the test, a radiologist (a healthcare provider who specializes in imaging tests) places a soft tube into the rectum. The tube is used to fill the colon with a contrast liquid (barium) and air. This can be uncomfortable for some people. The liquid helps the colon show up clearly on the X-rays. Because the test uses X-rays, it exposes you to a small amount of radiation.  Virtual colonoscopy  This exam is also called a CT colonography. It uses a series of X-ray photographs to create a 3-D view of the colon and rectum. The day before the test, you will need to do a bowel prep to clean out your colon. Your healthcare provider will give you instructions on how to do this. During the procedure, you will lie on a table that is part of a special X-ray machine called a CT scanner. A small tube will be placed into your rectum to fill the colon and rectum with air. This can be uncomfortable for some people. Then, the table will move into the machine and pictures will be taken of your colon and rectum. A computer will combine these photos to create a 3-D picture. Because the test uses X-rays, it exposes you to a small amount of  radiation.  Scope exams  Here are two types of scope exams:  · Colonoscopy. This test can be used to find and remove polyps anywhere in the colon or rectum. The day before the test, you will do a bowel prep. This is a liquid diet plus a strong laxative solution or an enema. The bowel prep will cleanse your colon. You will be given instructions for this. Just before the test, you are given a medicine to make you sleepy. Then, a long, flexible, lighted tube called a colonoscope is gently inserted into the rectum and guided through the entire colon. Images of the colon are viewed on a video screen. Any polyps that are found are removed and sent to a lab for testing. If a polyp cant be removed, a sample of tissue is taken and the polyp might be removed later during surgery. You will need to bring someone with you to drive you home after this test.   · Sigmoidoscopy. This test is similar to colonoscopy, but focuses only on the sigmoid colon and rectum. As with colonoscopy, bowel prep must be done the day before this test. It might not need to be as complete as the bowel prep for a colonoscopy. You are awake during the procedure, but you may be given medicine to help you relax. During the test, the healthcare provider guides a thin, flexible, lighted tube called a sigmoidoscope through your rectum and lower colon. The images are displayed on a video screen. Polyps are removed, if possible, and sent to a lab for testing.  Colonoscopy is the only screening test that lets your healthcare provider see the entire colon and rectum. This test also lets your healthcare provider remove any pieces of tissue that need to be looked at by a lab. If something suspicious is found using any other tests, you will likely need a colonoscopy.     When to call your healthcare provider after a test  Call your healthcare provider if you have any of the following after any screening test:  · Bleeding  · Fever of 100.4°F (38°C) or higher, or as  directed by your healthcare provider  · Abdominal pain  · Vomiting   Date Last Reviewed: 11/4/2015  © 1674-9394 [employer]. [address] [place] [address], [place] [address]. All rights reserved. This information is not intended as a substitute for professional medical care. Always follow your healthcare professional's instructions.

## 2020-02-18 NOTE — [employer] PATIENT INSTRUCTIONS
Discharge Summary/Instructions after an Endoscopic Procedure  Patient Name: Theo Matthew  Patient MRN: [id number]  Patient YOB: 1970 Tuesday, February 18, 2020  Agueda Jacques MD  RESTRICTIONS:  During your procedure today, you received medications for sedation.  These   medications may affect your judgment, balance and coordination.  Therefore,   for 24 hours, you have the following restrictions:   - DO NOT drive a car, operate machinery, make legal/financial decisions,   sign important papers or drink alcohol.    ACTIVITY:  Today: no heavy lifting, straining or running due to procedural   sedation/anesthesia.  The following day: return to full activity including work.  DIET:  Eat and drink normally unless instructed otherwise.     TREATMENT FOR COMMON SIDE EFFECTS:  - Mild abdominal pain, nausea, belching, bloating or excessive gas:  rest,   eat lightly and use a heating pad.  - Sore Throat: treat with throat lozenges and/or gargle with warm salt   water.  - Because air was used during the procedure, expelling large amounts of air   from your rectum or belching is normal.  - If a bowel prep was taken, you may not have a bowel movement for 1-3 days.    This is normal.  SYMPTOMS TO WATCH FOR AND REPORT TO YOUR PHYSICIAN:  1. Abdominal pain or bloating, other than gas cramps.  2. Chest pain.  3. Back pain.  4. Signs of infection such as: chills or fever occurring within 24 hours   after the procedure.  5. Rectal bleeding, which would show as bright red, maroon, or black stools.   (A tablespoon of blood from the rectum is not serious, especially if   hemorrhoids are present.)  6. Vomiting.  7. Weakness or dizziness.  GO DIRECTLY TO THE NEAREST EMERGENCY ROOM IF YOU HAVE ANY OF THE FOLLOWING:      Difficulty breathing              Chills and/or fever over 101 F   Persistent vomiting and/or vomiting blood   Severe abdominal pain   Severe chest pain   Black, tarry stools   Bleeding- more than one  tablespoon   Any other symptom or condition that you feel may need urgent attention  Your doctor recommends these additional instructions:  If any biopsies were taken, your doctors clinic will contact you in 1 to 2   weeks with any results.  - Discharge patient to home.   - Resume previous diet.   - Patient has a contact number available for emergencies.  The signs and   symptoms of potential delayed complications were discussed with the   patient.  Return to normal activities tomorrow.  Written discharge   instructions were provided to the patient.   - Continue present medications.   - Repeat colonoscopy in 10 years for screening purposes.  For questions, problems or results please call your physician - Agueda Jacques MD at Work:  [phone].  EMERGENCY PHONE NUMBER: [phone],  LAB RESULTS: [phone]  IF A COMPLICATION OR EMERGENCY SITUATION ARISES AND YOU ARE UNABLE TO REACH   YOUR PHYSICIAN - GO DIRECTLY TO THE EMERGENCY ROOM.  Agueda Jacques MD  2/18/2020 11:16:03 AM  This report has been verified and signed electronically.  [employer]

## 2020-03-16 PROBLEM — Z00.00 ANNUAL PHYSICAL EXAM: Status: RESOLVED | Noted: 2019-12-16 | Resolved: 2020-03-16

## 2020-04-03 ENCOUNTER — NURSE TRIAGE (OUTPATIENT)
Dept: ADMINISTRATIVE | Facility: CLINIC | Age: 50
End: 2020-04-03

## 2020-04-03 NOTE — TELEPHONE ENCOUNTER
Theo Matthew Jr. is a 49 y.o. male with no relevant past medical history called the COVID19 hotline with concern about possible/known exposure to COVID19 and change in taste. The patient denies sinus congestion, sore throat, chest pain, and shortness of breath.    Per protocol, I recommended Theo Matthew Jr. to use home care advice and the patient opted to use home care advice. The patient states he works for an [employer] and has been working the up until today but states that he has vacation time off for 2 weeks starting tomorrow, thus recommended that the patient use that time to monitor symptoms. The patient has been unable to check his temperature but denies feeling feverish.    I told Theo Matthew Jr. to call back if he has any worsening symptoms, new symptoms, or further questions or concerns. Pt verbalized understanding.    Neetu Lugo    Reason for Disposition   Colds with no complications    Additional Information   Negative: Severe difficulty breathing (e.g., struggling for each breath, speaks in single words)   Negative: Very weak (can't stand)   Negative: Sounds like a life-threatening emergency to the triager   Negative: Runny nose is caused by pollen or other allergies   Negative: Cough is the main symptom   Negative: Sore throat is the main symptom   Negative: Patient sounds very sick or weak to the triager   Negative: Fever > 103 F (39.4 C)   Negative: Fever > 101 F (38.3 C) and over 60 years of age   Negative: Fever > 100.0 F (37.8 C) and has diabetes mellitus or a weak immune system (e.g., HIV positive, cancer chemotherapy, organ transplant, splenectomy, chronic steroids)   Negative: Fever > 100.0 F (37.8 C) and bedridden (e.g., nursing home patient, stroke, chronic illness, recovering from surgery)   Negative: Fever present > 3 days (72 hours)   Negative: Fever returns after gone for over 24 hours and symptoms worse or not improved   Negative: Sinus pain (not just  congestion) and fever   Negative: Earache   Negative: Sinus congestion (pressure, fullness) present > 10 days   Negative: Nasal discharge present > 10 days   Negative: Using nasal washes and pain medicine > 24 hours and sinus pain (lower forehead, cheekbone, or eye) persists   Negative: Patient wants to be seen   Negative: Sore throat present > 5 days    Protocols used: COMMON COLD-A-[place]

## 2021-04-12 ENCOUNTER — OFFICE VISIT (OUTPATIENT)
Dept: FAMILY MEDICINE | Facility: CLINIC | Age: 51
End: 2021-04-12
Payer: COMMERCIAL

## 2021-04-12 VITALS
SYSTOLIC BLOOD PRESSURE: 116 MMHG | TEMPERATURE: 98 F | HEART RATE: 68 BPM | DIASTOLIC BLOOD PRESSURE: 70 MMHG | WEIGHT: 180.25 LBS | OXYGEN SATURATION: 98 % | BODY MASS INDEX: 29.09 KG/M2

## 2021-04-12 DIAGNOSIS — E78.2 MIXED HYPERLIPIDEMIA: ICD-10-CM

## 2021-04-12 DIAGNOSIS — R59.0 CERVICAL LYMPHADENOPATHY: ICD-10-CM

## 2021-04-12 DIAGNOSIS — Z00.00 ANNUAL PHYSICAL EXAM: Primary | ICD-10-CM

## 2021-04-12 DIAGNOSIS — Z11.4 SCREENING FOR HIV (HUMAN IMMUNODEFICIENCY VIRUS): ICD-10-CM

## 2021-04-12 DIAGNOSIS — M17.0 PRIMARY OSTEOARTHRITIS OF BOTH KNEES: ICD-10-CM

## 2021-04-12 DIAGNOSIS — Z11.59 SCREENING FOR VIRAL DISEASE: ICD-10-CM

## 2021-04-12 DIAGNOSIS — E66.3 OVERWEIGHT: ICD-10-CM

## 2021-04-12 PROCEDURE — 3008F BODY MASS INDEX DOCD: CPT | Mod: CPTII,S$GLB,, | Performed by: INTERNAL MEDICINE

## 2021-04-12 PROCEDURE — 1126F PR PAIN SEVERITY QUANTIFIED, NO PAIN PRESENT: ICD-10-PCS | Mod: S$GLB,,, | Performed by: INTERNAL MEDICINE

## 2021-04-12 PROCEDURE — 1126F AMNT PAIN NOTED NONE PRSNT: CPT | Mod: S$GLB,,, | Performed by: INTERNAL MEDICINE

## 2021-04-12 PROCEDURE — 99396 PREV VISIT EST AGE 40-64: CPT | Mod: S$GLB,,, | Performed by: INTERNAL MEDICINE

## 2021-04-12 PROCEDURE — 99999 PR PBB SHADOW E&M-EST. PATIENT-LVL III: CPT | Mod: PBBFAC,,, | Performed by: INTERNAL MEDICINE

## 2021-04-12 PROCEDURE — 99396 PR PREVENTIVE VISIT,EST,40-64: ICD-10-PCS | Mod: S$GLB,,, | Performed by: INTERNAL MEDICINE

## 2021-04-12 PROCEDURE — 3008F PR BODY MASS INDEX (BMI) DOCUMENTED: ICD-10-PCS | Mod: CPTII,S$GLB,, | Performed by: INTERNAL MEDICINE

## 2021-04-12 PROCEDURE — 99999 PR PBB SHADOW E&M-EST. PATIENT-LVL III: ICD-10-PCS | Mod: PBBFAC,,, | Performed by: INTERNAL MEDICINE

## 2021-05-04 ENCOUNTER — PATIENT MESSAGE (OUTPATIENT)
Dept: RESEARCH | Facility: HOSPITAL | Age: 51
End: 2021-05-04

## 2021-11-15 ENCOUNTER — PATIENT MESSAGE (OUTPATIENT)
Dept: FAMILY MEDICINE | Facility: CLINIC | Age: 51
End: 2021-11-15
Payer: COMMERCIAL

## 2021-12-08 ENCOUNTER — PATIENT MESSAGE (OUTPATIENT)
Dept: FAMILY MEDICINE | Facility: CLINIC | Age: 51
End: 2021-12-08

## 2021-12-08 ENCOUNTER — OFFICE VISIT (OUTPATIENT)
Dept: FAMILY MEDICINE | Facility: CLINIC | Age: 51
End: 2021-12-08
Payer: COMMERCIAL

## 2021-12-08 VITALS
BODY MASS INDEX: 28.38 KG/M2 | WEIGHT: 176.56 LBS | HEIGHT: 66 IN | OXYGEN SATURATION: 98 % | DIASTOLIC BLOOD PRESSURE: 90 MMHG | TEMPERATURE: 98 F | SYSTOLIC BLOOD PRESSURE: 130 MMHG | HEART RATE: 75 BPM

## 2021-12-08 DIAGNOSIS — R19.09 INGUINAL SWELLING: Primary | ICD-10-CM

## 2021-12-08 DIAGNOSIS — R97.20 ELEVATED PSA: Primary | ICD-10-CM

## 2021-12-08 DIAGNOSIS — Z12.5 SCREENING FOR MALIGNANT NEOPLASM OF PROSTATE: ICD-10-CM

## 2021-12-08 PROCEDURE — 99999 PR PBB SHADOW E&M-EST. PATIENT-LVL III: ICD-10-PCS | Mod: PBBFAC,,, | Performed by: INTERNAL MEDICINE

## 2021-12-08 PROCEDURE — 99214 OFFICE O/P EST MOD 30 MIN: CPT | Mod: S$GLB,,, | Performed by: INTERNAL MEDICINE

## 2021-12-08 PROCEDURE — 99214 PR OFFICE/OUTPT VISIT, EST, LEVL IV, 30-39 MIN: ICD-10-PCS | Mod: S$GLB,,, | Performed by: INTERNAL MEDICINE

## 2021-12-08 PROCEDURE — 99999 PR PBB SHADOW E&M-EST. PATIENT-LVL III: CPT | Mod: PBBFAC,,, | Performed by: INTERNAL MEDICINE

## 2021-12-09 ENCOUNTER — PATIENT OUTREACH (OUTPATIENT)
Dept: ADMINISTRATIVE | Facility: OTHER | Age: 51
End: 2021-12-09
Payer: COMMERCIAL

## 2021-12-10 ENCOUNTER — NURSE TRIAGE (OUTPATIENT)
Dept: ADMINISTRATIVE | Facility: CLINIC | Age: 51
End: 2021-12-10
Payer: COMMERCIAL

## 2021-12-10 ENCOUNTER — OFFICE VISIT (OUTPATIENT)
Dept: UROLOGY | Facility: CLINIC | Age: 51
End: 2021-12-10
Payer: COMMERCIAL

## 2021-12-10 VITALS
HEART RATE: 68 BPM | SYSTOLIC BLOOD PRESSURE: 131 MMHG | WEIGHT: 175.06 LBS | DIASTOLIC BLOOD PRESSURE: 93 MMHG | BODY MASS INDEX: 28.14 KG/M2 | HEIGHT: 66 IN

## 2021-12-10 DIAGNOSIS — R97.20 ELEVATED PSA: Primary | ICD-10-CM

## 2021-12-10 DIAGNOSIS — R97.20 ELEVATED PSA: ICD-10-CM

## 2021-12-10 DIAGNOSIS — N41.9 PROSTATITIS, UNSPECIFIED PROSTATITIS TYPE: ICD-10-CM

## 2021-12-10 DIAGNOSIS — Z12.5 ENCOUNTER FOR PROSTATE CANCER SCREENING: ICD-10-CM

## 2021-12-10 PROCEDURE — 99204 PR OFFICE/OUTPT VISIT, NEW, LEVL IV, 45-59 MIN: ICD-10-PCS | Mod: S$GLB,,, | Performed by: STUDENT IN AN ORGANIZED HEALTH CARE EDUCATION/TRAINING PROGRAM

## 2021-12-10 PROCEDURE — 99999 PR PBB SHADOW E&M-EST. PATIENT-LVL IV: ICD-10-PCS | Mod: PBBFAC,,, | Performed by: STUDENT IN AN ORGANIZED HEALTH CARE EDUCATION/TRAINING PROGRAM

## 2021-12-10 PROCEDURE — 99999 PR PBB SHADOW E&M-EST. PATIENT-LVL IV: CPT | Mod: PBBFAC,,, | Performed by: STUDENT IN AN ORGANIZED HEALTH CARE EDUCATION/TRAINING PROGRAM

## 2021-12-10 PROCEDURE — 99204 OFFICE O/P NEW MOD 45 MIN: CPT | Mod: S$GLB,,, | Performed by: STUDENT IN AN ORGANIZED HEALTH CARE EDUCATION/TRAINING PROGRAM

## 2021-12-10 RX ORDER — SULFAMETHOXAZOLE AND TRIMETHOPRIM 800; 160 MG/1; MG/1
1 TABLET ORAL 2 TIMES DAILY
Qty: 14 TABLET | Refills: 0 | Status: SHIPPED | OUTPATIENT
Start: 2021-12-10 | End: 2021-12-14

## 2021-12-13 ENCOUNTER — TELEPHONE (OUTPATIENT)
Dept: UROLOGY | Facility: CLINIC | Age: 51
End: 2021-12-13
Payer: COMMERCIAL

## 2021-12-14 ENCOUNTER — LAB VISIT (OUTPATIENT)
Dept: LAB | Facility: HOSPITAL | Age: 51
End: 2021-12-14
Attending: STUDENT IN AN ORGANIZED HEALTH CARE EDUCATION/TRAINING PROGRAM
Payer: COMMERCIAL

## 2021-12-14 ENCOUNTER — TELEPHONE (OUTPATIENT)
Dept: UROLOGY | Facility: CLINIC | Age: 51
End: 2021-12-14
Payer: COMMERCIAL

## 2021-12-14 DIAGNOSIS — Z12.5 ENCOUNTER FOR PROSTATE CANCER SCREENING: ICD-10-CM

## 2021-12-14 DIAGNOSIS — N41.9 PROSTATITIS, UNSPECIFIED PROSTATITIS TYPE: ICD-10-CM

## 2021-12-14 DIAGNOSIS — R97.20 ELEVATED PSA: ICD-10-CM

## 2021-12-14 PROCEDURE — 87086 URINE CULTURE/COLONY COUNT: CPT | Performed by: STUDENT IN AN ORGANIZED HEALTH CARE EDUCATION/TRAINING PROGRAM

## 2021-12-14 RX ORDER — SULFAMETHOXAZOLE AND TRIMETHOPRIM 800; 160 MG/1; MG/1
1 TABLET ORAL 2 TIMES DAILY
Qty: 42 TABLET | Refills: 0 | Status: SHIPPED | OUTPATIENT
Start: 2021-12-14 | End: 2022-01-06

## 2021-12-14 RX ORDER — SULFAMETHOXAZOLE AND TRIMETHOPRIM 800; 160 MG/1; MG/1
1 TABLET ORAL 2 TIMES DAILY
Qty: 42 TABLET | Refills: 0 | Status: SHIPPED | OUTPATIENT
Start: 2021-12-14 | End: 2021-12-14 | Stop reason: SDUPTHER

## 2021-12-16 LAB — BACTERIA UR CULT: NO GROWTH

## 2021-12-17 ENCOUNTER — PATIENT MESSAGE (OUTPATIENT)
Dept: FAMILY MEDICINE | Facility: CLINIC | Age: 51
End: 2021-12-17
Payer: COMMERCIAL

## 2021-12-19 ENCOUNTER — PATIENT MESSAGE (OUTPATIENT)
Dept: FAMILY MEDICINE | Facility: CLINIC | Age: 51
End: 2021-12-19
Payer: COMMERCIAL

## 2021-12-20 ENCOUNTER — PATIENT MESSAGE (OUTPATIENT)
Dept: INTERNAL MEDICINE | Facility: CLINIC | Age: 51
End: 2021-12-20
Payer: COMMERCIAL

## 2022-01-13 ENCOUNTER — OFFICE VISIT (OUTPATIENT)
Dept: UROLOGY | Facility: CLINIC | Age: 52
End: 2022-01-13
Payer: COMMERCIAL

## 2022-01-13 VITALS
HEART RATE: 69 BPM | BODY MASS INDEX: 28.52 KG/M2 | DIASTOLIC BLOOD PRESSURE: 94 MMHG | WEIGHT: 176.69 LBS | SYSTOLIC BLOOD PRESSURE: 147 MMHG

## 2022-01-13 DIAGNOSIS — Z12.5 ENCOUNTER FOR PROSTATE CANCER SCREENING: ICD-10-CM

## 2022-01-13 DIAGNOSIS — R97.20 ELEVATED PSA: Primary | ICD-10-CM

## 2022-01-13 PROCEDURE — 3080F DIAST BP >= 90 MM HG: CPT | Mod: CPTII,S$GLB,, | Performed by: STUDENT IN AN ORGANIZED HEALTH CARE EDUCATION/TRAINING PROGRAM

## 2022-01-13 PROCEDURE — 3077F PR MOST RECENT SYSTOLIC BLOOD PRESSURE >= 140 MM HG: ICD-10-PCS | Mod: CPTII,S$GLB,, | Performed by: STUDENT IN AN ORGANIZED HEALTH CARE EDUCATION/TRAINING PROGRAM

## 2022-01-13 PROCEDURE — 99214 PR OFFICE/OUTPT VISIT, EST, LEVL IV, 30-39 MIN: ICD-10-PCS | Mod: S$GLB,,, | Performed by: STUDENT IN AN ORGANIZED HEALTH CARE EDUCATION/TRAINING PROGRAM

## 2022-01-13 PROCEDURE — 99214 OFFICE O/P EST MOD 30 MIN: CPT | Mod: S$GLB,,, | Performed by: STUDENT IN AN ORGANIZED HEALTH CARE EDUCATION/TRAINING PROGRAM

## 2022-01-13 PROCEDURE — 3077F SYST BP >= 140 MM HG: CPT | Mod: CPTII,S$GLB,, | Performed by: STUDENT IN AN ORGANIZED HEALTH CARE EDUCATION/TRAINING PROGRAM

## 2022-01-13 PROCEDURE — 1159F MED LIST DOCD IN RCRD: CPT | Mod: CPTII,S$GLB,, | Performed by: STUDENT IN AN ORGANIZED HEALTH CARE EDUCATION/TRAINING PROGRAM

## 2022-01-13 PROCEDURE — 99999 PR PBB SHADOW E&M-EST. PATIENT-LVL III: CPT | Mod: PBBFAC,,, | Performed by: STUDENT IN AN ORGANIZED HEALTH CARE EDUCATION/TRAINING PROGRAM

## 2022-01-13 PROCEDURE — 99999 PR PBB SHADOW E&M-EST. PATIENT-LVL III: ICD-10-PCS | Mod: PBBFAC,,, | Performed by: STUDENT IN AN ORGANIZED HEALTH CARE EDUCATION/TRAINING PROGRAM

## 2022-01-13 PROCEDURE — 1159F PR MEDICATION LIST DOCUMENTED IN MEDICAL RECORD: ICD-10-PCS | Mod: CPTII,S$GLB,, | Performed by: STUDENT IN AN ORGANIZED HEALTH CARE EDUCATION/TRAINING PROGRAM

## 2022-01-13 PROCEDURE — 1160F PR REVIEW ALL MEDS BY PRESCRIBER/CLIN PHARMACIST DOCUMENTED: ICD-10-PCS | Mod: CPTII,S$GLB,, | Performed by: STUDENT IN AN ORGANIZED HEALTH CARE EDUCATION/TRAINING PROGRAM

## 2022-01-13 PROCEDURE — 3008F PR BODY MASS INDEX (BMI) DOCUMENTED: ICD-10-PCS | Mod: CPTII,S$GLB,, | Performed by: STUDENT IN AN ORGANIZED HEALTH CARE EDUCATION/TRAINING PROGRAM

## 2022-01-13 PROCEDURE — 1160F RVW MEDS BY RX/DR IN RCRD: CPT | Mod: CPTII,S$GLB,, | Performed by: STUDENT IN AN ORGANIZED HEALTH CARE EDUCATION/TRAINING PROGRAM

## 2022-01-13 PROCEDURE — 3008F BODY MASS INDEX DOCD: CPT | Mod: CPTII,S$GLB,, | Performed by: STUDENT IN AN ORGANIZED HEALTH CARE EDUCATION/TRAINING PROGRAM

## 2022-01-13 PROCEDURE — 3080F PR MOST RECENT DIASTOLIC BLOOD PRESSURE >= 90 MM HG: ICD-10-PCS | Mod: CPTII,S$GLB,, | Performed by: STUDENT IN AN ORGANIZED HEALTH CARE EDUCATION/TRAINING PROGRAM

## 2022-01-13 NOTE — PROGRESS NOTES
Subjective:       Patient ID: Theo Matthew Jr. is a 51 y.o. male.    Chief Complaint: Follow-up (Discuss prostate bx)  This is a 51 y.o.  male patient that is an established patient of mine.   Race: AA  Age: 51 y.o.   Previously abnormal PSA: no  Previous biopsy or recommended a biopsy: no  Previous abnormal YINA: no  Family history of prostate cancer yes - prostate cancer underwent surgery he was 54 yo. Maternal grandfather - prostate troubles had surgery. Unsure if cancer.     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     1/13/22   At our last visit on 12/10/21 which was our initial visit, I strongly recommended a prostate biopsy. He wanted to try a prostatitis course of abx and repeat a PSA. His repeat PSA resulted at 17 which was higher than his PSA initially of 14 on 12/8/21. He had questions about the digital rectal exam and zinc. Both I tried to answer as much as possible.     LAST PSA  Lab Results   Component Value Date    PSA 14.9 (H) 12/08/2021    PSADIAG 17.2 (H) 01/06/2022       Lab Results   Component Value Date    CREATININE 0.97 12/08/2021       ---  Past Medical History:   Diagnosis Date    Internal hemorrhoid        Past Surgical History:   Procedure Laterality Date    COLONOSCOPY  02/18/2020    COLONOSCOPY N/A 2/18/2020    Procedure: COLONOSCOPY/Suprep;  Surgeon: Agueda Jacques MD;  Location: [facility];  Service: Endoscopy;  Laterality: N/A;    KNEE LIGAMENT RECONSTRUCTION Right        Family History   Problem Relation Age of Onset    Seizures Father     Alcohol abuse Father     Hypertension Mother     No Known Problems Sister     Cancer Maternal Grandmother         passed 83 pancreate cancer    Diabetes Maternal Grandfather     Arthritis Paternal Grandmother     No Known Problems Paternal Grandfather        Social History     Tobacco Use    Smoking status: Never Smoker    Smokeless tobacco: Never Used   Substance Use Topics    Alcohol use: No     Drug use: No       No current outpatient medications on file prior to visit.     No current facility-administered medications on file prior to visit.       Review of patient's allergies indicates:  No Known Allergies    Review of Systems   Constitutional: Negative for chills.   HENT: Negative for congestion.    Eyes: Negative for visual disturbance.   Respiratory: Negative for shortness of breath.    Cardiovascular: Negative for chest pain.   Gastrointestinal: Negative for abdominal distention.   Musculoskeletal: Negative for gait problem.   Skin: Negative for color change.   Neurological: Negative for dizziness.   Psychiatric/Behavioral: Negative for agitation.       Objective:      Physical Exam  Constitutional:       Appearance: He is well-developed and well-nourished.   HENT:      Head: Normocephalic.   Eyes:      Pupils: Pupils are equal, round, and reactive to light.   Cardiovascular:      Pulses: Intact distal pulses.   Pulmonary:      Effort: Pulmonary effort is normal.   Abdominal:      Palpations: Abdomen is soft.   Musculoskeletal:         General: Normal range of motion.      Cervical back: Normal range of motion.   Skin:     General: Skin is warm and dry.   Neurological:      Mental Status: He is alert.   Psychiatric:         Mood and Affect: Mood and affect normal.         Assessment:       1. Elevated PSA    2. Encounter for prostate cancer screening        Plan:             Plan:  1. Patient understands I am strongly recommending a prostate biopsy again. His PSA is slightly higher than last month and is concerning to me.      The patient was counseled on the implications of an elevated PSA and velocity. It was explained in detail that this is a screening test and does not indicate any known presence of prostate cancer but that he may be at an increased risk given that he has an elevated PSA. The PSA is a nonspecific test and may be elevated due to prostatitis, BPH, recent urologic instrumentation, or  prostate cancer.      2. He did accept the prostate biopsy handout but did not want a biopsy scheduled nor a followup PSA scheduled. He stated he will discuss with his wife and reach back out with his plan but did not want anything schedule by us today. We abided by his wishes.           Elevated PSA    Encounter for prostate cancer screening

## 2023-03-21 ENCOUNTER — LAB VISIT (OUTPATIENT)
Dept: LAB | Facility: HOSPITAL | Age: 53
End: 2023-03-21
Attending: INTERNAL MEDICINE
Payer: COMMERCIAL

## 2023-03-21 ENCOUNTER — OFFICE VISIT (OUTPATIENT)
Dept: PRIMARY CARE CLINIC | Facility: CLINIC | Age: 53
End: 2023-03-21
Payer: COMMERCIAL

## 2023-03-21 VITALS
BODY MASS INDEX: 28.74 KG/M2 | HEIGHT: 66 IN | OXYGEN SATURATION: 98 % | WEIGHT: 178.81 LBS | SYSTOLIC BLOOD PRESSURE: 128 MMHG | HEART RATE: 65 BPM | DIASTOLIC BLOOD PRESSURE: 70 MMHG

## 2023-03-21 DIAGNOSIS — Z00.00 ANNUAL PHYSICAL EXAM: ICD-10-CM

## 2023-03-21 DIAGNOSIS — Z12.5 SCREENING PSA (PROSTATE SPECIFIC ANTIGEN): ICD-10-CM

## 2023-03-21 DIAGNOSIS — Z00.00 ANNUAL PHYSICAL EXAM: Primary | ICD-10-CM

## 2023-03-21 DIAGNOSIS — E66.3 OVERWEIGHT: ICD-10-CM

## 2023-03-21 DIAGNOSIS — E78.2 MIXED HYPERLIPIDEMIA: ICD-10-CM

## 2023-03-21 LAB
ALBUMIN SERPL BCP-MCNC: 4.2 G/DL (ref 3.5–5.2)
ALP SERPL-CCNC: 61 U/L (ref 55–135)
ALT SERPL W/O P-5'-P-CCNC: 20 U/L (ref 10–44)
ANION GAP SERPL CALC-SCNC: 7 MMOL/L (ref 8–16)
AST SERPL-CCNC: 23 U/L (ref 10–40)
BACTERIA #/AREA URNS AUTO: NORMAL /HPF
BASOPHILS # BLD AUTO: 0.04 K/UL (ref 0–0.2)
BASOPHILS NFR BLD: 0.9 % (ref 0–1.9)
BILIRUB SERPL-MCNC: 0.6 MG/DL (ref 0.1–1)
BILIRUB UR QL STRIP: NEGATIVE
BUN SERPL-MCNC: 14 MG/DL (ref 6–20)
CALCIUM SERPL-MCNC: 9.8 MG/DL (ref 8.7–10.5)
CHLORIDE SERPL-SCNC: 104 MMOL/L (ref 95–110)
CHOLEST SERPL-MCNC: 181 MG/DL (ref 120–199)
CHOLEST/HDLC SERPL: 4.5 {RATIO} (ref 2–5)
CLARITY UR REFRACT.AUTO: CLEAR
CO2 SERPL-SCNC: 29 MMOL/L (ref 23–29)
COLOR UR AUTO: YELLOW
COMPLEXED PSA SERPL-MCNC: 22.9 NG/ML (ref 0–4)
CREAT SERPL-MCNC: 1 MG/DL (ref 0.5–1.4)
DIFFERENTIAL METHOD: ABNORMAL
EOSINOPHIL # BLD AUTO: 0 K/UL (ref 0–0.5)
EOSINOPHIL NFR BLD: 0.9 % (ref 0–8)
ERYTHROCYTE [DISTWIDTH] IN BLOOD BY AUTOMATED COUNT: 13.2 % (ref 11.5–14.5)
EST. GFR  (NO RACE VARIABLE): >60 ML/MIN/1.73 M^2
ESTIMATED AVG GLUCOSE: 123 MG/DL (ref 68–131)
GLUCOSE SERPL-MCNC: 95 MG/DL (ref 70–110)
GLUCOSE UR QL STRIP: NEGATIVE
HBA1C MFR BLD: 5.9 % (ref 4–5.6)
HCT VFR BLD AUTO: 44.2 % (ref 40–54)
HDLC SERPL-MCNC: 40 MG/DL (ref 40–75)
HDLC SERPL: 22.1 % (ref 20–50)
HGB BLD-MCNC: 14.2 G/DL (ref 14–18)
HGB UR QL STRIP: ABNORMAL
IMM GRANULOCYTES # BLD AUTO: 0 K/UL (ref 0–0.04)
IMM GRANULOCYTES NFR BLD AUTO: 0 % (ref 0–0.5)
KETONES UR QL STRIP: NEGATIVE
LDLC SERPL CALC-MCNC: 125.6 MG/DL (ref 63–159)
LEUKOCYTE ESTERASE UR QL STRIP: NEGATIVE
LYMPHOCYTES # BLD AUTO: 2.5 K/UL (ref 1–4.8)
LYMPHOCYTES NFR BLD: 52.8 % (ref 18–48)
MCH RBC QN AUTO: 28.9 PG (ref 27–31)
MCHC RBC AUTO-ENTMCNC: 32.1 G/DL (ref 32–36)
MCV RBC AUTO: 90 FL (ref 82–98)
MICROSCOPIC COMMENT: NORMAL
MONOCYTES # BLD AUTO: 0.4 K/UL (ref 0.3–1)
MONOCYTES NFR BLD: 9 % (ref 4–15)
NEUTROPHILS # BLD AUTO: 1.7 K/UL (ref 1.8–7.7)
NEUTROPHILS NFR BLD: 36.4 % (ref 38–73)
NITRITE UR QL STRIP: NEGATIVE
NONHDLC SERPL-MCNC: 141 MG/DL
NRBC BLD-RTO: 0 /100 WBC
PH UR STRIP: 6 [PH] (ref 5–8)
PLATELET # BLD AUTO: 201 K/UL (ref 150–450)
PMV BLD AUTO: 12.3 FL (ref 9.2–12.9)
POTASSIUM SERPL-SCNC: 3.9 MMOL/L (ref 3.5–5.1)
PROT SERPL-MCNC: 7.6 G/DL (ref 6–8.4)
PROT UR QL STRIP: NEGATIVE
RBC # BLD AUTO: 4.91 M/UL (ref 4.6–6.2)
RBC #/AREA URNS AUTO: 4 /HPF (ref 0–4)
SODIUM SERPL-SCNC: 140 MMOL/L (ref 136–145)
SP GR UR STRIP: 1.01 (ref 1–1.03)
TRIGL SERPL-MCNC: 77 MG/DL (ref 30–150)
URN SPEC COLLECT METH UR: ABNORMAL
WBC # BLD AUTO: 4.68 K/UL (ref 3.9–12.7)
WBC #/AREA URNS AUTO: 0 /HPF (ref 0–5)

## 2023-03-21 PROCEDURE — 85025 COMPLETE CBC W/AUTO DIFF WBC: CPT | Performed by: INTERNAL MEDICINE

## 2023-03-21 PROCEDURE — 99999 PR PBB SHADOW E&M-EST. PATIENT-LVL III: ICD-10-PCS | Mod: PBBFAC,,, | Performed by: INTERNAL MEDICINE

## 2023-03-21 PROCEDURE — 80053 COMPREHEN METABOLIC PANEL: CPT | Performed by: INTERNAL MEDICINE

## 2023-03-21 PROCEDURE — 83036 HEMOGLOBIN GLYCOSYLATED A1C: CPT | Performed by: INTERNAL MEDICINE

## 2023-03-21 PROCEDURE — 99396 PREV VISIT EST AGE 40-64: CPT | Mod: S$GLB,,, | Performed by: INTERNAL MEDICINE

## 2023-03-21 PROCEDURE — 84153 ASSAY OF PSA TOTAL: CPT | Performed by: INTERNAL MEDICINE

## 2023-03-21 PROCEDURE — 99999 PR PBB SHADOW E&M-EST. PATIENT-LVL III: CPT | Mod: PBBFAC,,, | Performed by: INTERNAL MEDICINE

## 2023-03-21 PROCEDURE — 81001 URINALYSIS AUTO W/SCOPE: CPT | Performed by: INTERNAL MEDICINE

## 2023-03-21 PROCEDURE — 36415 COLL VENOUS BLD VENIPUNCTURE: CPT | Performed by: INTERNAL MEDICINE

## 2023-03-21 PROCEDURE — 99396 PR PREVENTIVE VISIT,EST,40-64: ICD-10-PCS | Mod: S$GLB,,, | Performed by: INTERNAL MEDICINE

## 2023-03-21 PROCEDURE — 80061 LIPID PANEL: CPT | Performed by: INTERNAL MEDICINE

## 2023-03-21 NOTE — PROGRESS NOTES
[facility] Note    Chief Complaint      Chief Complaint   Patient presents with    Annual Exam     History of Present Illness      Theo Matthew Jr. is a 52 y.o. male who presents today for Annual preventative visit.  Patient comes to appointment alone.    Problem List Items Addressed This Visit       Overweight    Current Assessment & Plan     Retired since last visit.  Does do a lot of cooking.         Mixed hyperlipidemia    Current Assessment & Plan     Improved in 2019 on labs, no family hx of stroke/MI.  Mom has HTN.          Other Visit Diagnoses       Annual physical exam    -  Primary    Relevant Orders    CBC Auto Differential    Lipid Panel    Comprehensive Metabolic Panel    Hemoglobin A1C    Screening PSA (prostate specific antigen)        Relevant Orders    PSA, SCREENING            Health Maintenance   Topic Date Due    Lipid Panel  04/14/2026    TETANUS VACCINE  08/22/2026    Hepatitis C Screening  Completed       Past Medical History:   Diagnosis Date    Internal hemorrhoid        Past Surgical History:   Procedure Laterality Date    COLONOSCOPY  02/18/2020    COLONOSCOPY N/A 2/18/2020    Procedure: COLONOSCOPY/Suprep;  Surgeon: Agueda Jacques MD;  Location: [facility];  Service: Endoscopy;  Laterality: N/A;    KNEE LIGAMENT RECONSTRUCTION Right        family history includes Alcohol abuse in his father; Arthritis in his paternal grandmother; Cancer in his maternal grandmother; Diabetes in his maternal grandfather; Hypertension in his mother; No Known Problems in his paternal grandfather and sister; Seizures in his father.    Social History     Tobacco Use    Smoking status: Never    Smokeless tobacco: Never   Substance Use Topics    Alcohol use: No    Drug use: No       Review of Systems   Constitutional:  Negative for chills and fever.   Respiratory:  Negative for cough and shortness of breath.    Cardiovascular:  Negative for chest pain and palpitations.   Gastrointestinal:   "Negative for constipation, diarrhea, nausea and vomiting.   Genitourinary:  Negative for dysuria and hematuria.   Musculoskeletal:  Negative for falls.   Neurological:  Negative for headaches.      No outpatient encounter medications on file as of 3/21/2023.     No facility-administered encounter medications on file as of 3/21/2023.        Review of patient's allergies indicates:  No Known Allergies    Physical Exam      Vital Signs  Pulse: 65  SpO2: 98 %  BP: 128/70  BP Location: Right arm  Patient Position: Sitting  Pain Score: 0-No pain  Height and Weight  Height: 5' 6" (167.6 cm)  Weight: 81.1 kg (178 lb 12.7 oz)  BSA (Calculated - sq m): 1.94 sq meters  BMI (Calculated): 28.9  Weight in (lb) to have BMI = 25: 154.6]    Physical Exam  Constitutional:       Appearance: He is well-developed.   HENT:      Head: Normocephalic and atraumatic.   Neck:      Thyroid: No thyromegaly.   Cardiovascular:      Rate and Rhythm: Normal rate and regular rhythm.      Heart sounds: No murmur heard.  Pulmonary:      Effort: Pulmonary effort is normal. No respiratory distress.      Breath sounds: Normal breath sounds.   Abdominal:      General: There is no distension.      Palpations: Abdomen is soft.      Tenderness: There is no abdominal tenderness.   Skin:     General: Skin is warm and dry.   Neurological:      Mental Status: He is alert and oriented to person, place, and time.   Psychiatric:         Behavior: Behavior normal.        Laboratory:  CBC:  No results for input(s): WBC, RBC, HGB, HCT, PLT, MCV, MCH, MCHC in the last 2160 hours.  CMP:  No results for input(s): GLU, CALCIUM, ALBUMIN, PROT, NA, K, CO2, CL, BUN, ALKPHOS, ALT, AST, BILITOT in the last 2160 hours.    Invalid input(s): CREATININ  URINALYSIS:  No results for input(s): COLORU, CLARITYU, SPECGRAV, PHUR, PROTEINUA, GLUCOSEU, BILIRUBINCON, BLOODU, WBCU, RBCU, BACTERIA, MUCUS, NITRITE, LEUKOCYTESUR, UROBILINOGEN, HYALINECASTS in the last 2160 hours.   LIPIDS:  No " results for input(s): TSH, HDL, CHOL, TRIG, LDLCALC, CHOLHDL, NONHDLCHOL, TOTALCHOLEST in the last 2160 hours.  TSH:  No results for input(s): TSH in the last 2160 hours.  A1C:  No results for input(s): HGBA1C in the last 2160 hours.    Radiology:  No results found in the last 30 days.     Assessment/Plan     Theo Matthew Jr. is a 52 y.o.male with:    1. Annual physical exam  - CBC Auto Differential; Future  - Lipid Panel; Future  - Comprehensive Metabolic Panel; Future  - Hemoglobin A1C; Future    2. Mixed hyperlipidemia    3. Overweight    4. Screening PSA (prostate specific antigen)  - PSA, SCREENING; Future    -labs ordered  -counseled on vaccines  -Continue current medications and maintain follow up with specialists.    -Follow up in about 1 year (around 3/21/2024) for Annual Visit.       Tenisha Becerra MD  [facility]

## 2024-05-02 ENCOUNTER — HOSPITAL ENCOUNTER (OUTPATIENT)
Dept: RADIOLOGY | Facility: HOSPITAL | Age: 54
Discharge: HOME | End: 2024-05-02
Attending: NURSE PRACTITIONER
Payer: COMMERCIAL

## 2024-05-02 ENCOUNTER — OFFICE VISIT (OUTPATIENT)
Dept: PRIMARY CARE CLINIC | Facility: CLINIC | Age: 54
End: 2024-05-02
Payer: COMMERCIAL

## 2024-05-02 VITALS
HEIGHT: 66 IN | WEIGHT: 181 LBS | HEART RATE: 69 BPM | SYSTOLIC BLOOD PRESSURE: 134 MMHG | BODY MASS INDEX: 29.09 KG/M2 | OXYGEN SATURATION: 96 % | DIASTOLIC BLOOD PRESSURE: 76 MMHG

## 2024-05-02 DIAGNOSIS — R97.20 ELEVATED PSA: ICD-10-CM

## 2024-05-02 DIAGNOSIS — R10.31 RIGHT INGUINAL PAIN: ICD-10-CM

## 2024-05-02 DIAGNOSIS — R30.0 DYSURIA: Primary | ICD-10-CM

## 2024-05-02 DIAGNOSIS — R30.0 DYSURIA: ICD-10-CM

## 2024-05-02 PROCEDURE — 99214 OFFICE O/P EST MOD 30 MIN: CPT | Mod: S$GLB,,, | Performed by: NURSE PRACTITIONER

## 2024-05-02 PROCEDURE — 3078F DIAST BP <80 MM HG: CPT | Mod: CPTII,S$GLB,, | Performed by: NURSE PRACTITIONER

## 2024-05-02 PROCEDURE — 76705 ECHO EXAM OF ABDOMEN: CPT | Mod: 26,,, | Performed by: RADIOLOGY

## 2024-05-02 PROCEDURE — 3008F BODY MASS INDEX DOCD: CPT | Mod: CPTII,S$GLB,, | Performed by: NURSE PRACTITIONER

## 2024-05-02 PROCEDURE — 3075F SYST BP GE 130 - 139MM HG: CPT | Mod: CPTII,S$GLB,, | Performed by: NURSE PRACTITIONER

## 2024-05-02 PROCEDURE — 1159F MED LIST DOCD IN RCRD: CPT | Mod: CPTII,S$GLB,, | Performed by: NURSE PRACTITIONER

## 2024-05-02 PROCEDURE — 76705 ECHO EXAM OF ABDOMEN: CPT | Mod: TC

## 2024-05-02 PROCEDURE — 99999 PR PBB SHADOW E&M-EST. PATIENT-LVL III: CPT | Mod: PBBFAC,,, | Performed by: NURSE PRACTITIONER

## 2024-05-02 NOTE — PROGRESS NOTES
[facility] Note    Chief Complaint      Chief Complaint   Patient presents with    Groin Pain     Tightness    trouble urinating       History of Present Illness      Theo Matthew Jr. is a 53 y.o. male with chronic conditions of elevated PSA, hld, osteoarthritis, who presents today for: here for complaining of trouble urinating, and R groin tightness for a couple of years. Associated with night sweats. Does have family hx of prostate cancer. Had previously seen Urology in 2022- did not want to do prostate biopsy. Has intermittent dysuria for awhile. No fever or chills. Denies unintentional weight loss. Still not currently interested in prostate biopsy.. did discuss possible MRI of prostate?    Answers submitted by the patient for this visit:  Review of Systems Questionnaire (Submitted on 4/29/2024)  activity change: No  unexpected weight change: No  rhinorrhea: No  trouble swallowing: No  visual disturbance: No  chest tightness: No  polyuria: Yes  difficulty urinating: Yes  joint swelling: No  arthralgias: No  confusion: No  dysphoric mood: No      Past Medical History:  Past Medical History:   Diagnosis Date    Internal hemorrhoid        Past Surgical History:   has a past surgical history that includes Knee ligament reconstruction (Right); Colonoscopy (02/18/2020); and Colonoscopy (N/A, 2/18/2020).    Family History:  family history includes Alcohol abuse in his father; Arthritis in his paternal grandmother; Cancer in his maternal grandmother; Diabetes in his maternal grandfather; Hypertension in his mother; No Known Problems in his paternal grandfather and sister; Seizures in his father.     Social History:  Social History     Tobacco Use    Smoking status: Never    Smokeless tobacco: Never   Substance Use Topics    Alcohol use: No    Drug use: No       Review of Systems   HENT:  Negative for hearing loss.    Eyes:  Negative for discharge.   Respiratory:  Negative for wheezing.   "  Cardiovascular:  Negative for chest pain and palpitations.   Gastrointestinal:  Negative for blood in stool, constipation, diarrhea and vomiting.   Genitourinary:  Positive for frequency and urgency. Negative for flank pain and hematuria.   Musculoskeletal:  Negative for back pain and neck pain.   Neurological:  Negative for weakness and headaches.   Endo/Heme/Allergies:  Negative for polydipsia.        Medications:  No outpatient encounter medications on file as of 5/2/2024.     No facility-administered encounter medications on file as of 5/2/2024.       Allergies:  Review of patient's allergies indicates:  No Known Allergies    Health Maintenance:  Immunization History   Administered Date(s) Administered    Tdap 08/22/2016      Health Maintenance   Topic Date Due    Shingles Vaccine (1 of 2) Never done    TETANUS VACCINE  08/22/2026    Lipid Panel  03/21/2028    Colorectal Cancer Screening  02/18/2030    Hepatitis C Screening  Completed        Physical Exam      Vital Signs  Pulse: 69  SpO2: 96 %  BP: 134/76  BP Location: Left arm  Patient Position: Sitting  Height and Weight  Height: 5' 6" (167.6 cm)  Weight: 82.1 kg (181 lb)  BSA (Calculated - sq m): 1.96 sq meters  BMI (Calculated): 29.2  Weight in (lb) to have BMI = 25: 154.6]    Physical Exam  Constitutional:       Appearance: He is well-developed.   HENT:      Head: Normocephalic and atraumatic.   Neck:      Thyroid: No thyromegaly.   Cardiovascular:      Rate and Rhythm: Normal rate and regular rhythm.      Heart sounds: No murmur heard.  Pulmonary:      Effort: Pulmonary effort is normal. No respiratory distress.      Breath sounds: Normal breath sounds.   Abdominal:      General: There is no distension.      Palpations: Abdomen is soft.      Tenderness: There is no abdominal tenderness. There is no right CVA tenderness or left CVA tenderness.   Musculoskeletal:         General: Normal range of motion.      Cervical back: Normal range of motion. "   Skin:     General: Skin is warm and dry.   Neurological:      Mental Status: He is alert and oriented to person, place, and time.   Psychiatric:         Behavior: Behavior normal.          Laboratory:  CBC:  Recent Labs   Lab 12/08/21  0924 03/21/23  0837 05/02/24  1224   WBC 4.48 4.68 4.97   RBC 5.32 4.91 4.82   Hemoglobin 15.5 14.2 14.2   Hematocrit 46.7 44.2 42.4   Platelets 229 201 187   MCV 88 90 88   MCH 29.1 28.9 29.5   MCHC 33.2 32.1 33.5     CMP:  Recent Labs   Lab 12/08/21  0924 03/21/23  0837 05/02/24  1224   Glucose 102 95 97   Calcium 9.8 9.8 9.7   Albumin 4.8 4.2 4.1   Total Protein 8.2 7.6 7.8   Sodium 144 140 139   Potassium 4.7 3.9 4.0   CO2 30 H 29 28   Chloride 101 104 104   BUN 15 14 14   Alkaline Phosphatase 59 61 60   ALT 28 20 19   AST 36 23 21   Total Bilirubin 0.6 0.6 0.5     URINALYSIS:  Recent Labs   Lab 03/21/23  0952   Color, UA Yellow   Specific Gravity, UA 1.015   pH, UA 6.0   Protein, UA Negative   Bacteria Rare   Nitrite, UA Negative   Leukocytes, UA Negative      LIPIDS:  Recent Labs   Lab 03/21/23  0837   HDL 40   Cholesterol 181   Triglycerides 77   LDL Cholesterol 125.6   HDL/Cholesterol Ratio 22.1   Non-HDL Cholesterol 141   Total Cholesterol/HDL Ratio 4.5     TSH:      A1C:  Recent Labs   Lab 03/21/23  0837   Hemoglobin A1C 5.9 H       Assessment/Plan     Theo Matthew Jr. is a 53 y.o.male with:    1. Dysuria  - Urinalysis; Future  - CULTURE, URINE; Future  - CBC W/ AUTO DIFFERENTIAL; Future  - COMPREHENSIVE METABOLIC PANEL; Future  - US Abdomen Limited_Hernia; Future    2. Elevated PSA  - PSA, SCREENING; Future  - has not seen Urology, encouraged follow up after results.     3. Right inguinal pain  - CBC W/ AUTO DIFFERENTIAL; Future  - COMPREHENSIVE METABOLIC PANEL; Future  - US Abdomen Limited_Hernia; Future       Chronic conditions status updated as per HPI.  Other than changes above, cont current medications and maintain follow up with specialists.  No follow-ups on  file.    No future appointments.    ANASTASIA Culver  [facility]

## 2024-06-03 ENCOUNTER — HOSPITAL ENCOUNTER (INPATIENT)
Facility: HOSPITAL | Age: 54
LOS: 1 days | Discharge: LEFT BEFORE DISCHARGE | DRG: 603 | End: 2024-06-05
Attending: EMERGENCY MEDICINE | Admitting: INTERNAL MEDICINE
Payer: COMMERCIAL

## 2024-06-03 DIAGNOSIS — L03.011 CELLULITIS OF RIGHT MIDDLE FINGER: ICD-10-CM

## 2024-06-03 DIAGNOSIS — L03.90 CELLULITIS, UNSPECIFIED CELLULITIS SITE: ICD-10-CM

## 2024-06-03 DIAGNOSIS — R07.9 CHEST PAIN: ICD-10-CM

## 2024-06-03 DIAGNOSIS — S61.239A PENETRATING WOUND OF FINGER, INITIAL ENCOUNTER: Primary | ICD-10-CM

## 2024-06-03 PROBLEM — M79.89 SWELLING OF RIGHT MIDDLE FINGER: Status: ACTIVE | Noted: 2024-06-03

## 2024-06-03 LAB — ERYTHROCYTE [SEDIMENTATION RATE] IN BLOOD BY PHOTOMETRIC METHOD: 29 MM/HR (ref 0–23)

## 2024-06-03 PROCEDURE — G0378 HOSPITAL OBSERVATION PER HR: HCPCS

## 2024-06-03 PROCEDURE — 99285 EMERGENCY DEPT VISIT HI MDM: CPT | Mod: 25

## 2024-06-03 PROCEDURE — A9585 GADOBUTROL INJECTION: HCPCS | Performed by: EMERGENCY MEDICINE

## 2024-06-03 PROCEDURE — 85652 RBC SED RATE AUTOMATED: CPT

## 2024-06-03 PROCEDURE — 25500020 PHARM REV CODE 255: Performed by: EMERGENCY MEDICINE

## 2024-06-03 RX ORDER — IPRATROPIUM BROMIDE AND ALBUTEROL SULFATE 2.5; .5 MG/3ML; MG/3ML
3 SOLUTION RESPIRATORY (INHALATION) EVERY 4 HOURS PRN
Status: DISCONTINUED | OUTPATIENT
Start: 2024-06-04 | End: 2024-06-05 | Stop reason: HOSPADM

## 2024-06-03 RX ORDER — BISACODYL 10 MG/1
10 SUPPOSITORY RECTAL DAILY PRN
Status: DISCONTINUED | OUTPATIENT
Start: 2024-06-04 | End: 2024-06-05 | Stop reason: HOSPADM

## 2024-06-03 RX ORDER — SIMETHICONE 80 MG
1 TABLET,CHEWABLE ORAL 4 TIMES DAILY PRN
Status: DISCONTINUED | OUTPATIENT
Start: 2024-06-04 | End: 2024-06-05 | Stop reason: HOSPADM

## 2024-06-03 RX ORDER — SODIUM CHLORIDE 0.9 % (FLUSH) 0.9 %
5 SYRINGE (ML) INJECTION
Status: DISCONTINUED | OUTPATIENT
Start: 2024-06-04 | End: 2024-06-05 | Stop reason: HOSPADM

## 2024-06-03 RX ORDER — SULFAMETHOXAZOLE AND TRIMETHOPRIM 800; 160 MG/1; MG/1
1 TABLET ORAL EVERY 12 HOURS
Status: ON HOLD | COMMUNITY
Start: 2024-05-29 | End: 2024-06-05 | Stop reason: HOSPADM

## 2024-06-03 RX ORDER — ALUMINUM HYDROXIDE, MAGNESIUM HYDROXIDE, AND SIMETHICONE 1200; 120; 1200 MG/30ML; MG/30ML; MG/30ML
30 SUSPENSION ORAL 4 TIMES DAILY PRN
Status: DISCONTINUED | OUTPATIENT
Start: 2024-06-04 | End: 2024-06-05 | Stop reason: HOSPADM

## 2024-06-03 RX ORDER — PROCHLORPERAZINE EDISYLATE 5 MG/ML
5 INJECTION INTRAMUSCULAR; INTRAVENOUS EVERY 6 HOURS PRN
Status: DISCONTINUED | OUTPATIENT
Start: 2024-06-04 | End: 2024-06-05 | Stop reason: HOSPADM

## 2024-06-03 RX ORDER — TALC
6 POWDER (GRAM) TOPICAL NIGHTLY PRN
Status: DISCONTINUED | OUTPATIENT
Start: 2024-06-03 | End: 2024-06-03

## 2024-06-03 RX ORDER — ACETAMINOPHEN 500 MG
1000 TABLET ORAL EVERY 8 HOURS PRN
Status: DISCONTINUED | OUTPATIENT
Start: 2024-06-04 | End: 2024-06-05 | Stop reason: HOSPADM

## 2024-06-03 RX ORDER — NALOXONE HCL 0.4 MG/ML
0.02 VIAL (ML) INJECTION
Status: DISCONTINUED | OUTPATIENT
Start: 2024-06-04 | End: 2024-06-05 | Stop reason: HOSPADM

## 2024-06-03 RX ORDER — GADOBUTROL 604.72 MG/ML
9 INJECTION INTRAVENOUS
Status: COMPLETED | OUTPATIENT
Start: 2024-06-03 | End: 2024-06-03

## 2024-06-03 RX ORDER — POLYETHYLENE GLYCOL 3350 17 G/17G
17 POWDER, FOR SOLUTION ORAL 2 TIMES DAILY PRN
Status: DISCONTINUED | OUTPATIENT
Start: 2024-06-04 | End: 2024-06-05 | Stop reason: HOSPADM

## 2024-06-03 RX ORDER — TALC
6 POWDER (GRAM) TOPICAL NIGHTLY PRN
Status: DISCONTINUED | OUTPATIENT
Start: 2024-06-04 | End: 2024-06-05 | Stop reason: HOSPADM

## 2024-06-03 RX ORDER — ONDANSETRON 8 MG/1
8 TABLET, ORALLY DISINTEGRATING ORAL EVERY 8 HOURS PRN
Status: DISCONTINUED | OUTPATIENT
Start: 2024-06-04 | End: 2024-06-05 | Stop reason: HOSPADM

## 2024-06-03 RX ORDER — ACETAMINOPHEN 325 MG/1
650 TABLET ORAL EVERY 4 HOURS PRN
Status: DISCONTINUED | OUTPATIENT
Start: 2024-06-04 | End: 2024-06-05 | Stop reason: HOSPADM

## 2024-06-03 RX ADMIN — GADOBUTROL 9 ML: 604.72 INJECTION INTRAVENOUS at 10:06

## 2024-06-03 NOTE — Clinical Note
Diagnosis: Penetrating wound of finger, initial encounter [[id number]]   Future Attending Provider: NOEMY SETHI [[id number]]

## 2024-06-04 LAB
ANION GAP SERPL CALC-SCNC: 7 MMOL/L (ref 8–16)
BUN SERPL-MCNC: 16 MG/DL (ref 6–20)
CALCIUM SERPL-MCNC: 9.4 MG/DL (ref 8.7–10.5)
CHLORIDE SERPL-SCNC: 106 MMOL/L (ref 95–110)
CHOLEST SERPL-MCNC: 167 MG/DL (ref 120–199)
CHOLEST/HDLC SERPL: 5.4 {RATIO} (ref 2–5)
CO2 SERPL-SCNC: 25 MMOL/L (ref 23–29)
CREAT SERPL-MCNC: 1 MG/DL (ref 0.5–1.4)
ERYTHROCYTE [DISTWIDTH] IN BLOOD BY AUTOMATED COUNT: 13.2 % (ref 11.5–14.5)
EST. GFR  (NO RACE VARIABLE): >60 ML/MIN/1.73 M^2
ESTIMATED AVG GLUCOSE: 120 MG/DL (ref 68–131)
GLUCOSE SERPL-MCNC: 98 MG/DL (ref 70–110)
HBA1C MFR BLD: 5.8 % (ref 4–5.6)
HCT VFR BLD AUTO: 38.8 % (ref 40–54)
HDLC SERPL-MCNC: 31 MG/DL (ref 40–75)
HDLC SERPL: 18.6 % (ref 20–50)
HGB BLD-MCNC: 13.2 G/DL (ref 14–18)
LDLC SERPL CALC-MCNC: 112.6 MG/DL (ref 63–159)
MAGNESIUM SERPL-MCNC: 2.1 MG/DL (ref 1.6–2.6)
MCH RBC QN AUTO: 30.1 PG (ref 27–31)
MCHC RBC AUTO-ENTMCNC: 34 G/DL (ref 32–36)
MCV RBC AUTO: 88 FL (ref 82–98)
NONHDLC SERPL-MCNC: 136 MG/DL
PLATELET # BLD AUTO: 197 K/UL (ref 150–450)
PMV BLD AUTO: 11.5 FL (ref 9.2–12.9)
POTASSIUM SERPL-SCNC: 3.8 MMOL/L (ref 3.5–5.1)
RBC # BLD AUTO: 4.39 M/UL (ref 4.6–6.2)
SODIUM SERPL-SCNC: 138 MMOL/L (ref 136–145)
TRIGL SERPL-MCNC: 117 MG/DL (ref 30–150)
WBC # BLD AUTO: 6.79 K/UL (ref 3.9–12.7)

## 2024-06-04 PROCEDURE — 36415 COLL VENOUS BLD VENIPUNCTURE: CPT

## 2024-06-04 PROCEDURE — 96375 TX/PRO/DX INJ NEW DRUG ADDON: CPT

## 2024-06-04 PROCEDURE — 80048 BASIC METABOLIC PNL TOTAL CA: CPT

## 2024-06-04 PROCEDURE — 11000001 HC ACUTE MED/SURG PRIVATE ROOM

## 2024-06-04 PROCEDURE — 96367 TX/PROPH/DG ADDL SEQ IV INF: CPT

## 2024-06-04 PROCEDURE — 25000003 PHARM REV CODE 250: Performed by: INTERNAL MEDICINE

## 2024-06-04 PROCEDURE — 85027 COMPLETE CBC AUTOMATED: CPT

## 2024-06-04 PROCEDURE — 83735 ASSAY OF MAGNESIUM: CPT

## 2024-06-04 PROCEDURE — 63600175 PHARM REV CODE 636 W HCPCS: Performed by: INTERNAL MEDICINE

## 2024-06-04 PROCEDURE — 80061 LIPID PANEL: CPT

## 2024-06-04 PROCEDURE — 96365 THER/PROPH/DIAG IV INF INIT: CPT

## 2024-06-04 PROCEDURE — 25000003 PHARM REV CODE 250

## 2024-06-04 PROCEDURE — 83036 HEMOGLOBIN GLYCOSYLATED A1C: CPT

## 2024-06-04 PROCEDURE — 63600175 PHARM REV CODE 636 W HCPCS

## 2024-06-04 RX ORDER — TRAMADOL HYDROCHLORIDE 50 MG/1
50 TABLET ORAL EVERY 6 HOURS PRN
Status: DISCONTINUED | OUTPATIENT
Start: 2024-06-04 | End: 2024-06-05 | Stop reason: HOSPADM

## 2024-06-04 RX ORDER — KETOROLAC TROMETHAMINE 30 MG/ML
15 INJECTION, SOLUTION INTRAMUSCULAR; INTRAVENOUS EVERY 6 HOURS PRN
Status: DISCONTINUED | OUTPATIENT
Start: 2024-06-04 | End: 2024-06-05 | Stop reason: HOSPADM

## 2024-06-04 RX ADMIN — CEFTRIAXONE 1 G: 1 INJECTION, POWDER, FOR SOLUTION INTRAMUSCULAR; INTRAVENOUS at 01:06

## 2024-06-04 RX ADMIN — VANCOMYCIN HYDROCHLORIDE 1000 MG: 1 INJECTION, POWDER, LYOPHILIZED, FOR SOLUTION INTRAVENOUS at 06:06

## 2024-06-04 RX ADMIN — KETOROLAC TROMETHAMINE 15 MG: 30 INJECTION, SOLUTION INTRAMUSCULAR at 01:06

## 2024-06-04 NOTE — PLAN OF CARE
Problem: Adult Inpatient Plan of Care  Goal: Plan of Care Review  6/4/2024 0511 by Josiane Berry RN  Outcome: Progressing  6/4/2024 0308 by Josiane Berry RN  Outcome: Progressing  Goal: Patient-Specific Goal (Individualized)  6/4/2024 0511 by Josiane Berry RN  Outcome: Progressing  6/4/2024 0308 by Josiane Berry RN  Outcome: Progressing  Goal: Absence of Hospital-Acquired Illness or Injury  6/4/2024 0511 by Josiane Berry RN  Outcome: Progressing  6/4/2024 0308 by Josiane Berry RN  Outcome: Progressing  Goal: Optimal Comfort and Wellbeing  6/4/2024 0511 by Josiane Berry RN  Outcome: Progressing  6/4/2024 0308 by Josiane Berry RN  Outcome: Progressing  Goal: Readiness for Transition of Care  6/4/2024 0511 by Josiane Berry RN  Outcome: Progressing  6/4/2024 0308 by Josiane Berry RN  Outcome: Progressing     Problem: Infection  Goal: Absence of Infection Signs and Symptoms  6/4/2024 0511 by Josiane Berry RN  Outcome: Progressing  6/4/2024 0308 by Josiane Berry RN  Outcome: Progressing     Problem: Fall Injury Risk  Goal: Absence of Fall and Fall-Related Injury  6/4/2024 0511 by Josiane Berry RN  Outcome: Progressing  6/4/2024 0308 by Josiane Berry RN  Outcome: Progressing
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
## 2024-06-04 NOTE — PROVIDER PROGRESS NOTES - EMERGENCY DEPT.
Signout Note    I received signout from the previous provider.     Chief [facility]:  [facility] on R hand - vanc currently infusing )      Pertinent history &exam:  Theo Matthew Jr. is a 54 y.o. male who presented to emergency department  as transfer from outside hospital for orthopedic surgery evaluation of wound to hand.  Patient received vancomycin at outside hospital.  He was signed out to me pending MRI of the digit and Orthopedic surgery recommendations for final disposition.    Vitals:    06/03/24 2117   BP: (!) 148/98   Pulse: 62   Resp: 18   Temp:        Imaging Studies:    MRI Hand Fingers W WO Contrast Right   Final Result      1. Small joint effusion of the 3rd proximal interphalangeal joint, suspicious for septic arthritis.  No evidence of acute osteomyelitis.   2. Cellulitis of the dorsal 3rd digit with a tiny subcentimeter abscess.   3. Small partial-thickness tear of the 3rd finger extensor tendon.         Electronically signed by: Onel Wynne   Date:    06/03/2024   Time:    22:53          Medications Given:  Medications   gadobutroL (GADAVIST) injection 9 mL (9 mLs Intravenous Given 6/3/24 2221)       Pending Items/ MDM:  54 y.o. male with  above complaint.  Patient seen by Orthopedic surgery who are recommending admission to Internal Medicine for IV antibiotics and treatment of a finger cellulitis.  Case discussed with hospital medicine and admitted to their service in stable condition    Diagnostic Impression:    1. Penetrating wound of finger, initial encounter    2. Cellulitis, unspecified cellulitis site         ED Disposition Condition    Observation Stable                 Mamta Shanks MD  Emergency Medicine
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
## 2024-06-04 NOTE — SUBJECTIVE & OBJECTIVE
Interval History: ORALIA LAWSONS. Continuing abx. Following wound cultures.     Review of Systems   Constitutional:  Negative for chills and fever.   Respiratory:  Negative for chest tightness and shortness of breath.    Cardiovascular:  Negative for chest pain and leg swelling.   Gastrointestinal:  Negative for abdominal pain and nausea.   Musculoskeletal:  Positive for arthralgias.   Neurological:  Negative for dizziness and weakness.     Objective:     Vital Signs (Most Recent):  Temp: 97.7 °F (36.5 °C) (06/04/24 1132)  Pulse: 69 (06/04/24 1132)  Resp: 20 (06/04/24 1132)  BP: 134/86 (06/04/24 1132)  SpO2: (!) 93 % (06/04/24 1132) Vital Signs (24h Range):  Temp:  [97.7 °F (36.5 °C)-98.8 °F (37.1 °C)] 97.7 °F (36.5 °C)  Pulse:  [58-75] 69  Resp:  [12-20] 20  SpO2:  [93 %-100 %] 93 %  BP: (128-163)/() 134/86     Weight: 84.5 kg (186 lb 4.6 oz)  Body mass index is 30.07 kg/m².  No intake or output data in the 24 hours ending 06/04/24 1226      Physical Exam  Vitals and nursing note reviewed.   Constitutional:       Appearance: He is well-developed.   Eyes:      Pupils: Pupils are equal, round, and reactive to light.   Cardiovascular:      Rate and Rhythm: Normal rate and regular rhythm.   Pulmonary:      Effort: Pulmonary effort is normal.      Breath sounds: Normal breath sounds.   Abdominal:      Palpations: Abdomen is soft.      Tenderness: There is no abdominal tenderness.   Musculoskeletal:         General: Swelling and tenderness present.      Comments: Decreased ROM 2/2 pain/swelling   Skin:     General: Skin is warm and dry.   Neurological:      Mental Status: He is alert and oriented to person, place, and time.   Psychiatric:         Behavior: Behavior normal.             Significant Labs: All pertinent labs within the past 24 hours have been reviewed.  CBC:   Recent Labs   Lab 06/03/24  1830 06/04/24  0443   WBC 6.59 6.79   HGB 13.6* 13.2*   HCT 40.1 38.8*    197     CMP:   Recent Labs   Lab  06/03/24  1830 06/04/24  0443    138   K 3.9 3.8    106   CO2 25 25   GLU 90 98   BUN 18 16   CREATININE 1.1 1.0   CALCIUM 9.3 9.4   ANIONGAP 8 7*       Significant Imaging: I have reviewed all pertinent imaging results/findings within the past 24 hours.

## 2024-06-04 NOTE — SUBJECTIVE & OBJECTIVE
Past Medical History:   Diagnosis Date    Internal hemorrhoid        Past Surgical History:   Procedure Laterality Date    COLONOSCOPY  02/18/2020    COLONOSCOPY N/A 2/18/2020    Procedure: COLONOSCOPY/Suprep;  Surgeon: Agueda Jacques MD;  Location: [facility];  Service: Endoscopy;  Laterality: N/A;    KNEE LIGAMENT RECONSTRUCTION Right        Review of patient's allergies indicates:  No Known Allergies    No current facility-administered medications for this encounter.     No current outpatient medications on file.     Family History       Problem Relation (Age of Onset)    Alcohol abuse Father    Arthritis Paternal Grandmother    Cancer Maternal Grandmother    Diabetes Maternal Grandfather    Hypertension Mother    No Known Problems Sister, Paternal Grandfather    Seizures Father          Tobacco Use    Smoking status: Never    Smokeless tobacco: Never   Substance and Sexual Activity    Alcohol use: No    Drug use: No    Sexual activity: Yes     Partners: Female     Birth control/protection: None     ROS  Constitutional: negative for fevers  Eyes: negative visual changes  ENT: negative for hearing loss  Respiratory: negative for dyspnea  Cardiovascular: negative for chest pain  Gastrointestinal: negative for abdominal pain  Genitourinary: negative for dysuria  Neurological: negative for headaches  Behavioral/Psych: negative for hallucinations  Endocrine: negative for temperature intolerance    Objective:     Vital Signs (Most Recent):  Temp: 98.6 °F (37 °C) (06/03/24 2007)  Pulse: 62 (06/03/24 2117)  Resp: 18 (06/03/24 2117)  BP: (!) 148/98 (06/03/24 2117)  SpO2: 96 % (06/03/24 2117) Vital Signs (24h Range):  Temp:  [98.3 °F (36.8 °C)-98.6 °F (37 °C)] 98.6 °F (37 °C)  Pulse:  [62-71] 62  Resp:  [12-18] 18  SpO2:  [96 %-100 %] 96 %  BP: (128-163)/() 148/98     Weight: 83.3 kg (183 lb 10.3 oz)     Body mass index is 29.64 kg/m².    No intake or output data in the 24 hours ending 06/03/24 2134     Ortho/SPM  Exam  General:  no acute distress, appears stated age   Neuro: alert and oriented x3  Psych: normal mood  Head: normocephalic, atraumatic.  Eyes: no scleral icterus  Mouth: moist mucous membranes  Cardiovascular: extremities warm and well perfused  Lungs: breathing comfortably, equal chest rise bilat  Skin: clean, dry, intact (any exceptions noted in below musculoskeletal exam)    MSK:  RUE:  - Finger held in slight flexion  - Laceration over dorsoradial aspect of long finger at level of PIP joint  - Moderate swelling to dorsum of PIP and proximal phalanx of long finger  - TTP over dorsum of PIP joint of long finger, no tenderness along flexor tendon sheath  - AROM and PROM of the shoulder, elbow, wrist, and hand intact without pain  - No pain with ROM of MCP, PIP or DIP J of long finger  - Axillary/AIN/PIN/Radial/Median/Ulnar Nerves assessed in isolation without deficit  - Decreased sensation over dorsum of long finger PIP joint  - Compartments soft  - Radial artery palpated   - Capillary Refill <3s    All other extremities with ROM grossly intact, NonTTP throughout, skin intact throughout       Significant Labs: CBC:   Recent Labs   Lab 06/03/24  1830   WBC 6.59   HGB 13.6*   HCT 40.1        CMP:   Recent Labs   Lab 06/03/24  1830      K 3.9      CO2 25   GLU 90   BUN 18   CREATININE 1.1   CALCIUM 9.3   ANIONGAP 8     ESR: 29    CRP:   Recent Labs   Lab 06/03/24  1830   CRP 5.6     All pertinent labs within the past 24 hours have been reviewed.    Significant Imaging: I have reviewed and interpreted all pertinent imaging results/findings.  XR R hand: Soft tissue swelling to long finger. No acute fracture or dislocation  MRI R hand: contrast enhancement of subcutaneous soft tissue over the dorsum of right long finger consistent with cellulitis; Mild contrast enhancement of extensor tendon sheath over dorsum of long finger

## 2024-06-04 NOTE — ED TRIAGE NOTES
Theo Matthew Jr., a 54 y.o. male presents to the ED w/ complaint of transferred from [facility] for evaluation of laceration to right third finger.    Triage note:  Chief Complaint   Patient presents with    Transfer     [facility] to 3rd digit on R hand - vanc currently infusing      Review of patient's allergies indicates:  No Known Allergies  Past Medical History:   Diagnosis Date    Internal hemorrhoid     LOC/ APPEARANCE: The patient is AAOx4. Pt is speaking appropriately, no slurred speech. Pt changed into hospital gown. Continuous cardiac monitor, cont pulse ox, and auto BP cuff applied to patient. Pt is clean and well groomed. No JVD visible. Pt reports pain level of 3. Pt updated on POC. Bed low and locked with side rails up x2, call bell in pt reach.  SKIN: Skin is warm dry and intact, and color is consistent with ethnicity.. No breakdown or brusing visible. Pt has laceration to third digit on right hand. Mucus membranes moist, acyanotic.  RESPIRATORY: Airway is open and patent. Respirations-spontaneous, unlabored, regular rate, equal bilaterally on inspiration and expiration. No accessory muscle use noted.   CARDIAC: Patient has regular heart rate.  No peripheral edema noted, and patient has no c/o chest pain. Peripheral pulses present equal and strong throughout.  ABDOMEN: Soft and non-tender to palpation with no distention noted. Pt has no complaints of abnormal bowel movements, denies blood in stool. Pt reports normal appetite.   NEUROLOGIC: Eyes open spontaneously and facial expression symmetrical. Pt behavior appropriate to situation, and pt follows commands. Pt reports sensation present in all extremities when touched with a finger, denies any numbness or tingling. PERRLA  MUSCULOSKELETAL: Spontaneous movement noted to all extremities. Hand  equal and leg strength strong +5 bilaterally.   : No complaints of frequency, burning, urgency or blood in the urine.  No complaints of incontinence.

## 2024-06-04 NOTE — HOSPITAL COURSE
Theo Matthew Jr. Is a 54 y.o. male who was admitted to hospital medicine for cellulitis of his R middle finger. MRI with small joint effusion of the 3rd proximal interphalangeal joint, suspicious for septic arthritis. No evidence of acute osteomyelitis. Cellulitis of the dorsal 3rd digit with a tiny subcentimeter abscess. Small partial-thickness tear of the 3rd finger extensor tendon. Ortho consulted, low concern for septic arthritis. Continuing abx. Patient states that he is leaving for a cruise tomorrow and needs to be discharge. Feel as though patient would benefit from an additional day of IV abx. Discussed with patient the risk and benefits of leaving prior to being medically ready including but not limited to worsening infection, bacteremia, sepsis and/or death. Patient expressed understanding and signed out AMA.     Physical Exam  Gen: in NAD, appears stated age  CVS: RRR, no m/r/g  Resp: no belabored breathing or accessory muscle use appreciated   Abd: NTND, soft to palpation  Extrem: no UE or LE edema BL; continued swelling to R middle finger, limited ROM

## 2024-06-04 NOTE — SUBJECTIVE & OBJECTIVE
"Principal Problem:Cellulitis of right middle finger    Principal Orthopedic Problem: As above    Interval History: Patient seen and examined at bedside. MIKALAON. Hypertensive overnight. Patient doing well. Pain has improved. Denies fevers, chills or night sweats. He states his ROM of left long finger has improved.      Review of patient's allergies indicates:  No Known Allergies    Current Facility-Administered Medications   Medication    acetaminophen tablet 1,000 mg    acetaminophen tablet 650 mg    albuterol-ipratropium 2.5 mg-0.5 mg/3 mL nebulizer solution 3 mL    aluminum-magnesium hydroxide-simethicone 200-200-20 mg/5 mL suspension 30 mL    bisacodyL suppository 10 mg    cefTRIAXone (Rocephin) 1 g in dextrose 5 % in water (D5W) 100 mL IVPB (MB+)    ketorolac injection 15 mg    melatonin tablet 6 mg    naloxone 0.4 mg/mL injection 0.02 mg    ondansetron disintegrating tablet 8 mg    polyethylene glycol packet 17 g    prochlorperazine injection Soln 5 mg    simethicone chewable tablet 80 mg    sodium chloride 0.9% flush 5 mL    traMADoL tablet 50 mg    vancomycin (VANCOCIN) 1,000 mg in dextrose 5 % (D5W) 250 mL IVPB (Vial-Mate)    vancomycin - pharmacy to dose     Objective:     Vital Signs (Most Recent):  Temp: 97.7 °F (36.5 °C) (06/04/24 0724)  Pulse: (!) 58 (06/04/24 0724)  Resp: 20 (06/04/24 0724)  BP: (!) 129/90 (06/04/24 0724)  SpO2: 98 % (06/04/24 0724) Vital Signs (24h Range):  Temp:  [97.7 °F (36.5 °C)-98.8 °F (37.1 °C)] 97.7 °F (36.5 °C)  Pulse:  [58-75] 58  Resp:  [12-20] 20  SpO2:  [95 %-100 %] 98 %  BP: (128-163)/() 129/90     Weight: 84.5 kg (186 lb 4.6 oz)  Height: 5' 6" (167.6 cm)  Body mass index is 30.07 kg/m².    No intake or output data in the 24 hours ending 06/04/24 0743     Ortho/SPM Exam  A&O x 3  Regular Rate  Non-Labored Respirations    LUE:   Swelling to dorsum of long finger  Incision over dorsoradial aspect of long finger  No pain with ROM of MCP, PIP or DIP " joint  SILT  Compartments soft  Brisk cap refill  Radial artery palpated     Significant Labs: CBC:   Recent Labs   Lab 06/03/24  1830 06/04/24  0443   WBC 6.59 6.79   HGB 13.6* 13.2*   HCT 40.1 38.8*    197     CMP:   Recent Labs   Lab 06/03/24  1830 06/04/24  0443    138   K 3.9 3.8    106   CO2 25 25   GLU 90 98   BUN 18 16   CREATININE 1.1 1.0   CALCIUM 9.3 9.4   ANIONGAP 8 7*     CRP:   Recent Labs   Lab 06/03/24  1830   CRP 5.6     All pertinent labs within the past 24 hours have been reviewed.    Significant Imaging: I have reviewed and interpreted all pertinent imaging results/findings.

## 2024-06-04 NOTE — NURSING
Nurses Note -- 4 Eyes      6/4/2024   12:36 AM      Skin assessed during: Admit      [] No Altered Skin Integrity Present    []Prevention Measures Documented      [x] Yes- Altered Skin Integrity Present or Discovered   [] LDA Added if Not in Epic (Describe Wound)   [x] New Altered Skin Integrity was Present on Admit and Documented in LDA   [] Wound Image Taken    Wound Care Consulted? Yes    Attending Nurse:  Josiane Cline RN/Staff Member:  [place]

## 2024-06-04 NOTE — ED TRIAGE NOTES
Theo Matthew Jr., a 54 y.o. male presents to the ED w/ complaint of transfer for ortho eval of 3rd digit. Reports cutting himself with a knife    Adult Physical Assessment  LOC: Theo Matthew Jr., 54 y.o. male verified via two identifiers.  The patient is awake, alert, oriented and speaking appropriately at this time.  APPEARANCE: Patient resting comfortably and appears to be in no acute distress at this time. Patient is clean and well groomed, patient's clothing is properly fastened.  SKIN:The skin is warm and dry, color consistent with ethnicity, patient has normal skin turgor and moist mucus membranes, skin intact, no breakdown or brusing noted.  MUSCULOSKELETAL: Patient moving all extremities well,3rd digit on right hand swollen. Reports cutting himself with a knife  RESPIRATORY: Airway is open and patent, respirations are spontaneous, patient has a normal effort and rate, no accessory muscle use noted.  CARDIAC: Patient has a normal rate and rhythm, no periphreal edema noted in any extremity, capillary refill < 3 seconds in all extremities  ABDOMEN: Soft and non tender to palpation, no abdominal distention noted. Bowel sounds present in all four quadrants.  NEUROLOGIC: Eyes open spontaneously, behavior appropriate to situation, follows commands, facial expression symmetrical, bilateral hand grasp equal and even, purposeful motor response noted, normal sensation in all extremities when touched with a finger.      Triage note:  Chief Complaint   Patient presents with    Transfer     [facility] for ortho eval of injury to 3rd digit on R hand - vanc currently infusing      Review of patient's allergies indicates:  No Known Allergies  Past Medical History:   Diagnosis Date    Internal hemorrhoid

## 2024-06-04 NOTE — HPI
Theo Matthew Jr. is a 54 y.o. male with PMH significant for HLD presenting with right middle finger pain and swelling after cutting his finger while cleaning a knife Saturday 5/25/24. The laceration is over the dorsoradial aspect of the right middle finger at the level of the PIP joint. He started to notice pain and swelling to the finger and dorsal hand on Tuesday 5/28. He presented to Urgent care Wednesday and was told it was infected. He was given a steroid shot and prescribed Bactrim which he has been taking as prescribed. Dorsal hand swelling improved with PO Abx but the right long finger swelling persisted. On Sunday, 6/2, he noticed discoloration and numbness over the dorsal aspect of the PIP joint. He presented to OSH 6/3/24 for further evaluation. Bedside I&D performed and specimen sent for culture. Per procedure note, serosanguinous fluid was drained. [place] [facility] [place] He was started on vancomycin prior to transfer. Denies any other musculoskeletal pain or injuries. No known history of prior right hand or finger injury or surgery. Doesn't take any anticoagulation at baseline.     They deny IV drug use.  They deny tobacco use.   They deny alcohol use.   They deny immunosuppressant medications.  They deny chemotherapy.  They deny radiation therapy.
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
## 2024-06-04 NOTE — ED PROVIDER NOTES
Encounter Date: 6/3/2024       History     Chief Complaint   Patient presents with    Transfer     [facility] for ortho eval of injury to 3rd digit on R hand - vanc currently infusing       This patient is a 54-year-old male Past Medical History:  No date: Internal hemorrhoid   Presenting to the emergency department for evaluation of suspected septic arthritis of right 3rd digit.  He reports approximately 9 days ago cutting it while cleaning a knife, 4 days later the finger and hand had swollen causing him to go to urgent care for which he was prescribed Bactrim.  He reports taking the Bactrim and that the swelling in the hand improved but swelling of the right middle finger persisted.  He presented to [facility] today where incision and drainage was performed and scant amount of purulent material was expressed.  He was forwarded here for further evaluation by orthopedics/hand and receive MRI.  He received vancomycin prior to arrival.  Still infusing at the time of my evaluation.  Tetanus up-to-date.      Review of patient's allergies indicates:  No Known Allergies  Past Medical History:   Diagnosis Date    Internal hemorrhoid      Past Surgical History:   Procedure Laterality Date    COLONOSCOPY  02/18/2020    COLONOSCOPY N/A 2/18/2020    Procedure: COLONOSCOPY/Suprep;  Surgeon: Agueda Jacques MD;  Location: [facility];  Service: Endoscopy;  Laterality: N/A;    KNEE LIGAMENT RECONSTRUCTION Right      Family History   Problem Relation Name Age of Onset    Seizures Father Theo Matthew Sr     Alcohol abuse Father Theo Matthew Sr     Hypertension Mother Mom     No Known Problems Sister      Cancer Maternal Grandmother Chen Hernandez         passed 83 pancreate cancer    Diabetes Maternal Grandfather Lory Reeves     Arthritis Paternal Grandmother Lillian Boggs     No Known Problems Paternal Grandfather       Social History     Tobacco Use    Smoking status: Never    Smokeless tobacco: Never   Substance  Use Topics    Alcohol use: No    Drug use: No     Review of Systems   Musculoskeletal:  Positive for arthralgias.       Physical Exam     Initial Vitals   BP Pulse Resp Temp SpO2   06/03/24 2006 06/03/24 2006 06/03/24 2006 06/03/24 2007 06/03/24 2006   (!) 148/89 63 12 98.6 °F (37 °C) 98 %      MAP       --                Physical Exam    Nursing note and vitals reviewed.  Constitutional: He appears well-nourished.   HENT:   Head: Normocephalic and atraumatic.   Eyes: Conjunctivae and EOM are normal.   Neck: Neck supple.   Normal range of motion.  Cardiovascular:  Normal rate and regular rhythm.           Pulmonary/Chest: Breath sounds normal. No respiratory distress.   Musculoskeletal:         General: Tenderness and edema present.      Cervical back: Normal range of motion and neck supple.      Comments:   Swelling of the right 3rd digit, tenderness with manipulation, more predominantly noted around the PIP, tenderness with passive extension, and palpation along the tendon sheath, hemostatic linear incision on the radial aspect of the PIP joint     Neurological: He is alert and oriented to person, place, and time. He has normal strength. No sensory deficit. GCS score is 15. GCS eye subscore is 4. GCS verbal subscore is 5. GCS motor subscore is 6.   Skin: Skin is warm and dry. There is erythema.   Psychiatric: He has a normal mood and affect. Thought content normal.         ED Course   Procedures  Labs Reviewed   SEDIMENTATION RATE - Abnormal; Notable for the following components:       Result Value    Sed Rate 29 (*)     All other components within normal limits          Imaging Results              MRI Hand Fingers W WO Contrast Right (Final result)  Result time 06/03/24 22:53:37      Final result by Onel Wynne DO (06/03/24 22:53:37)                   Impression:      1. Small joint effusion of the 3rd proximal interphalangeal joint, suspicious for septic arthritis.  No evidence of acute  osteomyelitis.  2. Cellulitis of the dorsal 3rd digit with a tiny subcentimeter abscess.  3. Small partial-thickness tear of the 3rd finger extensor tendon.      Electronically signed by: Onel Wynne  Date:    06/03/2024  Time:    22:53               Narrative:    EXAMINATION:  MRI HAND FINGERS W WO CONTRAST RIGHT    CLINICAL HISTORY:  Septic arthritis suspected, hand, xray done;    TECHNIQUE:  Multisequence, multi planar magnetic resonance imaging of the right hand before and after the intravenous administration of 9 mL Gadavist.    COMPARISON:  Hand radiograph from earlier today.    FINDINGS:  There is a small 3rd digit proximal interphalangeal joint effusion and synovitis, concerning for septic arthritis.  Remaining articulations are unremarkable.  There is a small subcentimeter rim enhancing fluid collection in the radial dorsal 3rd digit overlying the proximal phalangeal head (series 12, image 34), measuring approximately 4 x 4 mm.  There is soft tissue edema and enhancement of the dorsal 3rd digit.  Marrow signal is normal, without evidence of edema or enhancement.  No evidence of acute osteomyelitis.  No evidence of an acute fracture or dislocation.  Alignment is normal. There is a small defect in the extensor tendon of the 3rd digit, located just proximal to the proximal interphalangeal joint, compatible with a partial tear (series 5, image 19).  Remaining flexor and extensor tendons are intact.  No evidence of tenosynovitis.  The ligaments of the fingers and hand are intact.                                       Medications   sodium chloride 0.9% flush 5 mL (has no administration in time range)   albuterol-ipratropium 2.5 mg-0.5 mg/3 mL nebulizer solution 3 mL (has no administration in time range)   melatonin tablet 6 mg (has no administration in time range)   ondansetron disintegrating tablet 8 mg (has no administration in time range)   prochlorperazine injection Soln 5 mg (has no administration in time  range)   polyethylene glycol packet 17 g (has no administration in time range)   bisacodyL suppository 10 mg (has no administration in time range)   simethicone chewable tablet 80 mg (has no administration in time range)   aluminum-magnesium hydroxide-simethicone 200-200-20 mg/5 mL suspension 30 mL (has no administration in time range)   acetaminophen tablet 650 mg (has no administration in time range)   acetaminophen tablet 1,000 mg (has no administration in time range)   naloxone 0.4 mg/mL injection 0.02 mg (has no administration in time range)   vancomycin - pharmacy to dose (has no administration in time range)   vancomycin (VANCOCIN) 1,000 mg in dextrose 5 % (D5W) 250 mL IVPB (Vial-Mate) (1,000 mg Intravenous Trough Due As Scheduled Before Dose 6/5/24 0530)   cefTRIAXone (Rocephin) 1 g in dextrose 5 % in water (D5W) 100 mL IVPB (MB+) (0 g Intravenous Stopped 6/4/24 0153)   ketorolac injection 15 mg (15 mg Intravenous Given 6/4/24 0122)   traMADoL tablet 50 mg (has no administration in time range)   gadobutroL (GADAVIST) injection 9 mL (9 mLs Intravenous Given 6/3/24 2221)     Medical Decision Making    Patient emergently assessed as transfer after arrival.  He is receiving vancomycin at the time of my evaluation.  There was concern for septic arthritis associated with the PIP joint of the complaint hand.  MRI hand fingers with and without contrast will be obtained, per the request of the on-call orthopedic resident.  Case signed out to the oncoming a.m. physician regarding ultimate disposition.    Amount and/or Complexity of Data Reviewed  Radiology: ordered.    Risk  Prescription drug management.                                      Clinical Impression:  Final diagnoses:  [S61.239A] Penetrating wound of finger, initial encounter (Primary)  [L03.90] Cellulitis, unspecified cellulitis site          ED Disposition Condition    Observation Stable                Krystian Reed MD  06/04/24 7708

## 2024-06-04 NOTE — SUBJECTIVE & OBJECTIVE
Past Medical History:   Diagnosis Date    Internal hemorrhoid        Past Surgical History:   Procedure Laterality Date    COLONOSCOPY  02/18/2020    COLONOSCOPY N/A 2/18/2020    Procedure: COLONOSCOPY/Suprep;  Surgeon: Agueda Jacques MD;  Location: [facility];  Service: Endoscopy;  Laterality: N/A;    KNEE LIGAMENT RECONSTRUCTION Right        Review of patient's allergies indicates:  No Known Allergies    Current Facility-Administered Medications on File Prior to Encounter   Medication    [DISCONTINUED] vancomycin 1,500 mg in dextrose 5 % (D5W) 250 mL IVPB (Vial-Mate)    [COMPLETED] vancomycin 2 g in 0.9% sodium chloride 500 mL IVPB     Current Outpatient Medications on File Prior to Encounter   Medication Sig    sulfamethoxazole-trimethoprim 800-160mg (BACTRIM DS) 800-160 mg Tab Take 1 tablet by mouth every 12 (twelve) hours.     Family History       Problem Relation (Age of Onset)    Alcohol abuse Father    Arthritis Paternal Grandmother    Cancer Maternal Grandmother    Diabetes Maternal Grandfather    Hypertension Mother    No Known Problems Sister, Paternal Grandfather    Seizures Father          Tobacco Use    Smoking status: Never    Smokeless tobacco: Never   Substance and Sexual Activity    Alcohol use: No    Drug use: No    Sexual activity: Yes     Partners: Female     Birth control/protection: None     Review of Systems   Constitutional:  Negative for activity change, chills and fever.   HENT:  Negative for trouble swallowing.    Eyes:  Negative for photophobia and visual disturbance.   Respiratory:  Negative for chest tightness, shortness of breath and wheezing.    Cardiovascular:  Negative for chest pain, palpitations and leg swelling.   Gastrointestinal:  Negative for abdominal pain, constipation, diarrhea, nausea and vomiting.   Genitourinary:  Negative for dysuria, frequency, hematuria and urgency.   Musculoskeletal:  Positive for joint swelling. Negative for arthralgias, back pain and gait  problem.   Skin:  Positive for color change (RT 3rd digit Erethematous) and wound (RT 3rd digit). Negative for rash.   Neurological:  Negative for dizziness, syncope, weakness, light-headedness, numbness and headaches.   Psychiatric/Behavioral:  Negative for agitation and confusion. The patient is not nervous/anxious.      Objective:     Vital Signs (Most Recent):  Temp: 98.2 °F (36.8 °C) (06/04/24 0025)  Pulse: 66 (06/04/24 0025)  Resp: 18 (06/04/24 0025)  BP: 129/88 (06/04/24 0025)  SpO2: 95 % (06/04/24 0025) Vital Signs (24h Range):  Temp:  [98.2 °F (36.8 °C)-98.8 °F (37.1 °C)] 98.2 °F (36.8 °C)  Pulse:  [62-75] 66  Resp:  [12-18] 18  SpO2:  [95 %-100 %] 95 %  BP: (128-163)/() 129/88     Weight: 84.5 kg (186 lb 4.6 oz)  Body mass index is 30.07 kg/m².     Physical Exam  Vitals and nursing note reviewed.   Constitutional:       General: He is not in acute distress.     Appearance: He is well-developed. He is not ill-appearing or toxic-appearing.   HENT:      Head: Normocephalic and atraumatic.      Mouth/Throat:      Pharynx: No oropharyngeal exudate.   Eyes:      Conjunctiva/sclera: Conjunctivae normal.      Pupils: Pupils are equal, round, and reactive to light.   Cardiovascular:      Rate and Rhythm: Normal rate and regular rhythm.      Heart sounds: Normal heart sounds. No murmur heard.     No friction rub. No gallop.   Pulmonary:      Effort: Pulmonary effort is normal. No respiratory distress.      Breath sounds: Normal breath sounds. No wheezing.   Abdominal:      General: Bowel sounds are normal. There is no distension.      Palpations: Abdomen is soft.      Tenderness: There is no abdominal tenderness.   Musculoskeletal:         General: Swelling (to PIP of R 3rd digit) present. No tenderness.      Cervical back: Normal range of motion and neck supple.      Comments: Decreased ROM to 3rd R digit   Lymphadenopathy:      Cervical: No cervical adenopathy.   Skin:     General: Skin is warm and dry.       Capillary Refill: Capillary refill takes less than 2 seconds.      Findings: Erythema present. No rash.      Comments: Small laceration to dorsal aspect of 3rd R digit. Mild ttp. Expressed small amount of serosanguinous drainage   Neurological:      Mental Status: He is alert and oriented to person, place, and time.      Cranial Nerves: No cranial nerve deficit.      Sensory: No sensory deficit.      Coordination: Coordination normal.   Psychiatric:         Behavior: Behavior normal.         Thought Content: Thought content normal.         Judgment: Judgment normal.              CRANIAL NERVES     CN III, IV, VI   Pupils are equal, round, and reactive to light.         Significant Imaging:     Imaging Results              MRI Hand Fingers W WO Contrast Right (Final result)  Result time 06/03/24 22:53:37      Final result by Onel Wynne DO (06/03/24 22:53:37)                   Impression:      1. Small joint effusion of the 3rd proximal interphalangeal joint, suspicious for septic arthritis.  No evidence of acute osteomyelitis.  2. Cellulitis of the dorsal 3rd digit with a tiny subcentimeter abscess.  3. Small partial-thickness tear of the 3rd finger extensor tendon.      Electronically signed by: Onel Wynne  Date:    06/03/2024  Time:    22:53               Narrative:    EXAMINATION:  MRI HAND FINGERS W WO CONTRAST RIGHT    CLINICAL HISTORY:  Septic arthritis suspected, hand, xray done;    TECHNIQUE:  Multisequence, multi planar magnetic resonance imaging of the right hand before and after the intravenous administration of 9 mL Gadavist.    COMPARISON:  Hand radiograph from earlier today.    FINDINGS:  There is a small 3rd digit proximal interphalangeal joint effusion and synovitis, concerning for septic arthritis.  Remaining articulations are unremarkable.  There is a small subcentimeter rim enhancing fluid collection in the radial dorsal 3rd digit overlying the proximal phalangeal head (series 12, image  34), measuring approximately 4 x 4 mm.  There is soft tissue edema and enhancement of the dorsal 3rd digit.  Marrow signal is normal, without evidence of edema or enhancement.  No evidence of acute osteomyelitis.  No evidence of an acute fracture or dislocation.  Alignment is normal. There is a small defect in the extensor tendon of the 3rd digit, located just proximal to the proximal interphalangeal joint, compatible with a partial tear (series 5, image 19).  Remaining flexor and extensor tendons are intact.  No evidence of tenosynovitis.  The ligaments of the fingers and hand are intact.

## 2024-06-04 NOTE — CONSULTS
[facility] - Emergency Dept  Orthopedics  Consult Note    Patient Name: Theo Matthew Jr.  MRN: [id number]  Admission Date: 6/3/2024  Hospital Length of Stay: 0 days  Attending Provider: Philip Carrero MD  Primary Care Provider: Tenisha Becerra MD        Inpatient consult to Orthopedic Surgery  Consult performed by: ANKUR Guevara MD  Consult ordered by: Krystian Reed MD        Subjective:     Principal Problem:Cellulitis of right middle finger    Chief Complaint:   Chief Complaint   Patient presents with    Transfer     [facility] for ortho eval of injury to 3rd digit on R hand - vanc currently infusing         HPI: Theo Matthew Jr. is a 54 y.o. male with PMH significant for HLD presenting with right middle finger pain and swelling after cutting his finger while cleaning a knife Saturday 5/25/24. The laceration is over the dorsoradial aspect of the right middle finger at the level of the PIP joint. He started to notice pain and swelling to the finger and dorsal hand on Tuesday 5/28. He presented to Urgent care Wednesday and was told it was infected. He was given a steroid shot and prescribed Bactrim which he has been taking as prescribed. Dorsal hand swelling improved with PO Abx but the right long finger swelling persisted. On Sunday, 6/2, he noticed discoloration and numbness over the dorsal aspect of the PIP joint. He presented to OSH 6/3/24 for further evaluation. Bedside I&D performed and specimen sent for culture. Per procedure note, serosanguinous fluid was drained. Patient transferred to [facility] for further care. He was started on vancomycin prior to transfer. Denies any other musculoskeletal pain or injuries. No known history of prior right hand or finger injury or surgery. Doesn't take any anticoagulation at baseline.     They deny IV drug use.  They deny tobacco use.   They deny alcohol use.   They deny immunosuppressant medications.  They deny chemotherapy.  They deny radiation therapy.        Past Medical History:   Diagnosis Date    Internal hemorrhoid        Past Surgical History:   Procedure Laterality Date    COLONOSCOPY  02/18/2020    COLONOSCOPY N/A 2/18/2020    Procedure: COLONOSCOPY/Suprep;  Surgeon: Agueda Jacques MD;  Location: [facility] ENDO;  Service: Endoscopy;  Laterality: N/A;    KNEE LIGAMENT RECONSTRUCTION Right        Review of patient's allergies indicates:  No Known Allergies    No current facility-administered medications for this encounter.     No current outpatient medications on file.     Family History       Problem Relation (Age of Onset)    Alcohol abuse Father    Arthritis Paternal Grandmother    Cancer Maternal Grandmother    Diabetes Maternal Grandfather    Hypertension Mother    No Known Problems Sister, Paternal Grandfather    Seizures Father          Tobacco Use    Smoking status: Never    Smokeless tobacco: Never   Substance and Sexual Activity    Alcohol use: No    Drug use: No    Sexual activity: Yes     Partners: Female     Birth control/protection: None     ROS  Constitutional: negative for fevers  Eyes: negative visual changes  ENT: negative for hearing loss  Respiratory: negative for dyspnea  Cardiovascular: negative for chest pain  Gastrointestinal: negative for abdominal pain  Genitourinary: negative for dysuria  Neurological: negative for headaches  Behavioral/Psych: negative for hallucinations  Endocrine: negative for temperature intolerance    Objective:     Vital Signs (Most Recent):  Temp: 98.6 °F (37 °C) (06/03/24 2007)  Pulse: 62 (06/03/24 2117)  Resp: 18 (06/03/24 2117)  BP: (!) 148/98 (06/03/24 2117)  SpO2: 96 % (06/03/24 2117) Vital Signs (24h Range):  Temp:  [98.3 °F (36.8 °C)-98.6 °F (37 °C)] 98.6 °F (37 °C)  Pulse:  [62-71] 62  Resp:  [12-18] 18  SpO2:  [96 %-100 %] 96 %  BP: (128-163)/() 148/98     Weight: 83.3 kg (183 lb 10.3 oz)     Body mass index is 29.64 kg/m².    No intake or output data in the 24 hours ending 06/03/24 2134      Ortho/SPM Exam  General:  no acute distress, appears stated age   Neuro: alert and oriented x3  Psych: normal mood  Head: normocephalic, atraumatic.  Eyes: no scleral icterus  Mouth: moist mucous membranes  Cardiovascular: extremities warm and well perfused  Lungs: breathing comfortably, equal chest rise bilat  Skin: clean, dry, intact (any exceptions noted in below musculoskeletal exam)    MSK:  RUE:  - Finger held in slight flexion  - Laceration over dorsoradial aspect of long finger at level of PIP joint  - Moderate swelling to dorsum of PIP and proximal phalanx of long finger  - TTP over dorsum of PIP joint of long finger, no tenderness along flexor tendon sheath  - AROM and PROM of the shoulder, elbow, wrist, and hand intact without pain  - No pain with ROM of MCP, PIP or DIP J of long finger  - Axillary/AIN/PIN/Radial/Median/Ulnar Nerves assessed in isolation without deficit  - Decreased sensation over dorsum of long finger PIP joint  - Compartments soft  - Radial artery palpated   - Capillary Refill <3s    All other extremities with ROM grossly intact, NonTTP throughout, skin intact throughout       Significant Labs: CBC:   Recent Labs   Lab 06/03/24  1830   WBC 6.59   HGB 13.6*   HCT 40.1        CMP:   Recent Labs   Lab 06/03/24  1830      K 3.9      CO2 25   GLU 90   BUN 18   CREATININE 1.1   CALCIUM 9.3   ANIONGAP 8     ESR: 29    CRP:   Recent Labs   Lab 06/03/24  1830   CRP 5.6     All pertinent labs within the past 24 hours have been reviewed.    Significant Imaging: I have reviewed and interpreted all pertinent imaging results/findings.  XR R hand: Soft tissue swelling to long finger. No acute fracture or dislocation  MRI R hand: contrast enhancement of subcutaneous soft tissue over the dorsum of right long finger consistent with cellulitis; Mild contrast enhancement of extensor tendon sheath over dorsum of long finger  Assessment/Plan:     * Cellulitis of right middle  finger  Theo Matthew Jr. is a 54 y.o. male with PMH of HLD presenting with right long finger pain and swelling to the dorsum of PIP joint after sustaining a laceration while cleaning a knife on 5/25/24. I&D performed at OSH with serosanguinous drainage. Cultures sent. Patient received 2g of Vancomycin prior to transfer. Denies fevers, chills or nightsweats. On exam, patient has tenderness over dorsum of PIP joint. No tenderness along flexor tendon sheath. No pain with ROM of MCP, PIP or DIP of right long finger. 1/4 Kanavel signs. WBC 6.59, CRP 5.9 and ESR 29. MRI consistent with cellulitis and mild extensor tenosynovitis. At this time, low concern for septic arthritis or flexor tenosynovitis.     - Admit to hospital medicine for IV abx  - Multimodal pain control  - f/u culture from outside hospital  - Repeat exam in AM  - NPO midnight as precaution  - Will discuss further plan with staff              ANKUR Guevara MD  Orthopedics  [facility] - Emergency Dept

## 2024-06-04 NOTE — PROGRESS NOTES
"[facility]  Orthopedics  Progress Note    Patient Name: Theo Matthew Jr.  MRN: [id number]  Admission Date: 6/3/2024  Hospital Length of Stay: 0 days  Attending Provider: Philip Carrero MD  Primary Care Provider: Tenisha Becerra MD    Subjective:     Principal Problem:Cellulitis of right middle finger    Principal Orthopedic Problem: As above    Interval History: Patient seen and examined at bedside. NAEON. Hypertensive overnight. Patient doing well. Pain has improved. Denies fevers, chills or night sweats. He states his ROM of left long finger has improved.      Review of patient's allergies indicates:  No Known Allergies    Current Facility-Administered Medications   Medication    acetaminophen tablet 1,000 mg    acetaminophen tablet 650 mg    albuterol-ipratropium 2.5 mg-0.5 mg/3 mL nebulizer solution 3 mL    aluminum-magnesium hydroxide-simethicone 200-200-20 mg/5 mL suspension 30 mL    bisacodyL suppository 10 mg    cefTRIAXone (Rocephin) 1 g in dextrose 5 % in water (D5W) 100 mL IVPB (MB+)    ketorolac injection 15 mg    melatonin tablet 6 mg    naloxone 0.4 mg/mL injection 0.02 mg    ondansetron disintegrating tablet 8 mg    polyethylene glycol packet 17 g    prochlorperazine injection Soln 5 mg    simethicone chewable tablet 80 mg    sodium chloride 0.9% flush 5 mL    traMADoL tablet 50 mg    vancomycin (VANCOCIN) 1,000 mg in dextrose 5 % (D5W) 250 mL IVPB (Vial-Mate)    vancomycin - pharmacy to dose     Objective:     Vital Signs (Most Recent):  Temp: 97.7 °F (36.5 °C) (06/04/24 0724)  Pulse: (!) 58 (06/04/24 0724)  Resp: 20 (06/04/24 0724)  BP: (!) 129/90 (06/04/24 0724)  SpO2: 98 % (06/04/24 0724) Vital Signs (24h Range):  Temp:  [97.7 °F (36.5 °C)-98.8 °F (37.1 °C)] 97.7 °F (36.5 °C)  Pulse:  [58-75] 58  Resp:  [12-20] 20  SpO2:  [95 %-100 %] 98 %  BP: (128-163)/() 129/90     Weight: 84.5 kg (186 lb 4.6 oz)  Height: 5' 6" (167.6 cm)  Body mass index is 30.07 kg/m².    No intake or " output data in the 24 hours ending 06/04/24 0743     Ortho/SPM Exam  A&O x 3  Regular Rate  Non-Labored Respirations    LUE:   Swelling to dorsum of long finger  Incision over dorsoradial aspect of long finger  No pain with ROM of MCP, PIP or DIP joint  SILT  Compartments soft  Brisk cap refill  Radial artery palpated     Significant Labs: CBC:   Recent Labs   Lab 06/03/24  1830 06/04/24  0443   WBC 6.59 6.79   HGB 13.6* 13.2*   HCT 40.1 38.8*    197     CMP:   Recent Labs   Lab 06/03/24 1830 06/04/24  0443    138   K 3.9 3.8    106   CO2 25 25   GLU 90 98   BUN 18 16   CREATININE 1.1 1.0   CALCIUM 9.3 9.4   ANIONGAP 8 7*     CRP:   Recent Labs   Lab 06/03/24  1830   CRP 5.6     All pertinent labs within the past 24 hours have been reviewed.    Significant Imaging: I have reviewed and interpreted all pertinent imaging results/findings.  Assessment/Plan:     * Cellulitis of right middle finger  Theo Matthew . is a 54 y.o. male with PMH of HLD presenting with right long finger pain and swelling to the dorsum of PIP joint after sustaining a laceration while cleaning a knife on 5/25/24. I&D performed at OSH with serosanguinous drainage. Cultures sent. Patient received 2g of Vancomycin prior to transfer. Denies fevers, chills or nightsweats. On exam, patient has tenderness over dorsum of PIP joint. No tenderness along flexor tendon sheath. No pain with ROM of MCP, PIP or DIP of right long finger. 1/4 Kanavel signs. WBC 6.59, CRP 5.9 and ESR 29. MRI consistent with cellulitis and mild extensor tenosynovitis. At this time, low concern for septic arthritis or flexor tenosynovitis. Patient improving with IV abx.     - IV abx per primary  - Multimodal pain control  - f/u culture  - Ok for diet  - No acute orthopedic intervention anticipated at this time, will continue to follow            ANKUR Guevara MD  Orthopedics  [facility] - Observation 11H

## 2024-06-04 NOTE — ASSESSMENT & PLAN NOTE
Theo Matthew Jr. is a 54 y.o. male with PMH of HLD presenting with right long finger pain and swelling to the dorsum of PIP joint after sustaining a laceration while cleaning a knife on 5/25/24. I&D performed at OSH with serosanguinous drainage. Cultures sent. Patient received 2g of Vancomycin prior to transfer. Denies fevers, chills or nightsweats. On exam, patient has tenderness over dorsum of PIP joint. No tenderness along flexor tendon sheath. No pain with ROM of MCP, PIP or DIP of right long finger. 1/4 Kanavel signs. WBC 6.59, CRP 5.9 and ESR 29. MRI consistent with cellulitis and mild extensor tenosynovitis. At this time, low concern for septic arthritis or flexor tenosynovitis.     - Admit to hospital medicine for IV abx  - Multimodal pain control  - f/u culture from outside hospital  - Repeat exam in AM  - NPO midnight as precaution  - Will discuss further plan with staff

## 2024-06-04 NOTE — PROGRESS NOTES
"Pharmacokinetic Initial Assessment: IV Vancomycin    Assessment/Plan:    Initiate intravenous vancomycin with loading dose of 2000 mg once, done at outside facility, followed by a maintenance dose of vancomycin 1000 mg IV every 12 hours.  Desired empiric serum trough concentration is 15 to 20 mcg/mL.  Draw vancomycin trough level 60 min prior to fourth dose on 06/05/2024 at 0530.  Pharmacy will continue to follow and monitor vancomycin.      Please contact pharmacy at extension [phone] with any questions regarding this assessment.     Thank you for the consult,   Galina Salazar       Patient brief summary:  Theo Matthew Jr. is a 54 y.o. male initiated on antimicrobial therapy with IV Vancomycin for treatment of suspected bone/joint infection.    Drug Allergies:   Review of patient's allergies indicates:  No Known Allergies    Actual Body Weight:   83.3 kg    Renal Function:   Estimated Creatinine Clearance: 76.8 mL/min (based on SCr of 1.1 mg/dL).     CBC (last 72 hours):  Recent Labs   Lab Result Units 06/03/24  1830   WBC K/uL 6.59   Hemoglobin g/dL 13.6*   Hematocrit % 40.1   Platelets K/uL 209   Gran % % 50.3   Lymph % % 39.6   Mono % % 8.2   Eosinophil % % 1.4   Basophil % % 0.5   Differential Method  Automated       Metabolic Panel (last 72 hours):  Recent Labs   Lab Result Units 06/03/24  1830   Sodium mmol/L 139   Potassium mmol/L 3.9   Chloride mmol/L 106   CO2 mmol/L 25   Glucose mg/dL 90   BUN mg/dL 18   Creatinine mg/dL 1.1       Drug levels (last 3 results):  No results for input(s): "VANCOMYCINRA", "VANCORANDOM", "VANCOMYCINPE", "VANCOPEAK", "VANCOMYCINTR", "VANCOTROUGH" in the last 72 hours.    Microbiologic Results:  Microbiology Results (last 7 days)       ** No results found for the last 168 hours. **            "

## 2024-06-04 NOTE — PLAN OF CARE
[facility]  Initial Discharge Assessment       Primary Care Provider: Tenisha Becerra MD    Admission Diagnosis: Chest pain [R07.9]  Penetrating wound of finger, initial encounter [S61.002A]  Cellulitis, unspecified cellulitis site [L03.90]    Admission Date: 6/3/2024  Expected Discharge Date: 6/6/2024    Transition of Care Barriers: None    Payor: BLUE CROSS BLUE SHIELD / Plan: BCBS ALL OUT OF STATE / Product Type: PPO /     Extended Emergency Contact Information  Primary Emergency Contact: Kenia Matthew  Address: [address] [place] [address] [place] [address] [facility]  Home Phone: [phone]  Mobile Phone: [phone]  Relation: Spouse    Discharge Plan A: Home with family  Discharge Plan B: Home      [id number] [place] - [id number] include 234 beds at the [facility]  [id number] [place] [address]  Phone: [phone] Fax: [phone]    [facility]  [address]  [facility] [address]  Phone: [phone] Fax: [phone]      Initial Assessment (most recent)       Adult Discharge Assessment - 06/04/24 0904          Discharge Assessment    Assessment Type Discharge Planning Assessment     Confirmed/corrected address, phone number and insurance Yes     Confirmed Demographics Correct on Facesheet     Source of Information patient     If unable to respond/provide information was family/caregiver contacted? No Contact Information Available     Communicated ARON with patient/caregiver Date not available/Unable to determine     Reason For Admission Cellulitis of right middle finger     People in Home spouse     Do you expect to return to your current living situation? Yes     Do you have help at home or someone to help you manage your care at home? Yes     Who are your caregiver(s) and their phone number(s)? Spouse: Kenia Matthew [phone]     Prior to hospitilization cognitive status: Alert/Oriented     Current cognitive status: Alert/Oriented     Walking or Climbing  Stairs Difficulty no     Dressing/Bathing Difficulty no     Equipment Currently Used at Home none     Patient currently being followed by outpatient case management? No     Do you currently have service(s) that help you manage your care at home? No     Do you take prescription medications? Yes     Do you have prescription coverage? Yes     Coverage BCBS     Do you have any problems affording any of your prescribed medications? No     Is the patient taking medications as prescribed? yes     Who is going to help you get home at discharge? Spouse: Kenia Matthew [phone]     How do you get to doctors appointments? car, drives self;family or friend will provide     Are you on dialysis? No     Do you take coumadin? No     Discharge Plan A Home with family     Discharge Plan B Home     DME Needed Upon Discharge  none     Discharge Plan discussed with: Patient     Transition of Care Barriers None                   SW met with the patient who was accompanied by his family at the bedside and discussed the discharge plan. Gave him the discharge booklet and placed contact numbers on the white board in the room. Patient alert and sitting up in bed.  Patient verified his name , , PCP, Insurance and Pharmacy . Stated he lives with spouse and has 0 steps to point of entry . Prior to admission patient was independent with all ADLS and wasn't receiving any HH services. He is not on coumadin nor is he a dialysis patient . DME's include:none reported.  He takes medication as prescribed and has no problems getting  medication. Family will provide transportation at ID.      GINA Oconnor  Department of Case Management   [facility]  [address] [place] [address]  Phone : [phone]      Discharge Plan A and Plan B have been determined by review of patient's clinical status, future medical and therapeutic needs, and coverage/benefits for post-acute care in coordination with multidisciplinary team  members.

## 2024-06-04 NOTE — HPI
Theo Matthew Jr. is a 54 year old male with no PMH who was transferred from [facility] for evaluation of cellulitis and suspicion of septic arthritis of the 3rd digit of his right hand. Patient states that he injured his finger with a hunting knife on 5/24/2024. He states that he applied Neosporin and tea tree oil to his injury and took Ibuprofen for pain, with temporary alleviation of symptoms. Three days later he went to the urgent care 2/2 his finger becoming erythematous with swelling spreading to the dorsal aspect of his right hand. He states that he received a steroid injection and was prescribed Bactrim, in which he noticed slight improvement in the swelling of his hand. He returned to  this afternoon after noticing discoloration of his finger and mucopurulent discharge. Patient was referred to the ED at [facility] where an incision and drainage was performed and received vancomycin prior to arrival. Patient currently reports a 5/10 throbbing pain in his RT 3rd digit and c/o limited flexion of his finger. He denies pain and limited ROM in his other fingers. He also denies fever, SOB, CP, HA, abdominal pain,  N/V/D/C. Tetanus up-to-date. He is on no home medications.    In ED: Pt is A&O X 4 and in NAD. VSS. /96. No leukocytosis. Hgb 13.6 with baseline ~ 14.2. ESR 29.CRP 5.6. X-Ray of hand completed with no acute fracture or dislocation. MRI of hand completed; findings c/w cellulitis and suspicion of septic arthritis in RT 3rd digit. Orthopedics consulted. Placed in observation.

## 2024-06-04 NOTE — PROGRESS NOTES
[facility] [address] [place] [address] Medicine  Progress Note    Patient Name: Theo Matthew Jr.  MRN: [id number]  Patient Class: OP- Observation   Admission Date: 6/3/2024  Length of Stay: 0 days  Attending Physician: Philip Carrero MD  Primary Care Provider: Tenisha Becerra MD        Subjective:     Principal Problem:Cellulitis of right middle finger        HPI:  Theo Matthew Jr. is a 54 year old male with no PMH who was transferred from [facility] for evaluation of cellulitis and suspicion of septic arthritis of the 3rd digit of his right hand. Patient states that he injured his finger with a hunting knife on 5/24/2024. He states that he applied Neosporin and tea tree oil to his injury and took Ibuprofen for pain, with temporary alleviation of symptoms. Three days later he went to the urgent care 2/2 his finger becoming erythematous with swelling spreading to the dorsal aspect of his right hand. He states that he received a steroid injection and was prescribed Bactrim, in which he noticed slight improvement in the swelling of his hand. He returned to  this afternoon after noticing discoloration of his finger and mucopurulent discharge. Patient was referred to the ED at [facility] where an incision and drainage was performed and received vancomycin prior to arrival. Patient currently reports a 5/10 throbbing pain in his RT 3rd digit and c/o limited flexion of his finger. He denies pain and limited ROM in his other fingers. He also denies fever, SOB, CP, HA, abdominal pain,  N/V/D/C. Tetanus up-to-date. He is on no home medications.    In ED: Pt is A&O X 4 and in NAD. VSS. /96. No leukocytosis. Hgb 13.6 with baseline ~ 14.2. ESR 29.CRP 5.6. X-Ray of hand completed with no acute fracture or dislocation. MRI of hand completed; findings c/w cellulitis and suspicion of septic arthritis in RT 3rd digit. Orthopedics consulted. Placed in observation.     Overview/Hospital Course:  Theo Matthew Jr.  Is a 54 y.o. male who was admitted to hospital medicine for cellulitis of his R middle finger. MRI with small joint effusion of the 3rd proximal interphalangeal joint, suspicious for septic arthritis. No evidence of acute osteomyelitis. Cellulitis of the dorsal 3rd digit with a tiny subcentimeter abscess. Small partial-thickness tear of the 3rd finger extensor tendon. Ortho consulted, low concern for septic arthritis. Continuing abx.     Interval History: NAEON, VSS. Continuing abx. Following wound cultures.     Review of Systems   Constitutional:  Negative for chills and fever.   Respiratory:  Negative for chest tightness and shortness of breath.    Cardiovascular:  Negative for chest pain and leg swelling.   Gastrointestinal:  Negative for abdominal pain and nausea.   Musculoskeletal:  Positive for arthralgias.   Neurological:  Negative for dizziness and weakness.     Objective:     Vital Signs (Most Recent):  Temp: 97.7 °F (36.5 °C) (06/04/24 1132)  Pulse: 69 (06/04/24 1132)  Resp: 20 (06/04/24 1132)  BP: 134/86 (06/04/24 1132)  SpO2: (!) 93 % (06/04/24 1132) Vital Signs (24h Range):  Temp:  [97.7 °F (36.5 °C)-98.8 °F (37.1 °C)] 97.7 °F (36.5 °C)  Pulse:  [58-75] 69  Resp:  [12-20] 20  SpO2:  [93 %-100 %] 93 %  BP: (128-163)/() 134/86     Weight: 84.5 kg (186 lb 4.6 oz)  Body mass index is 30.07 kg/m².  No intake or output data in the 24 hours ending 06/04/24 1226      Physical Exam  Vitals and nursing note reviewed.   Constitutional:       Appearance: He is well-developed.   Eyes:      Pupils: Pupils are equal, round, and reactive to light.   Cardiovascular:      Rate and Rhythm: Normal rate and regular rhythm.   Pulmonary:      Effort: Pulmonary effort is normal.      Breath sounds: Normal breath sounds.   Abdominal:      Palpations: Abdomen is soft.      Tenderness: There is no abdominal tenderness.   Musculoskeletal:         General: Swelling and tenderness present. Worsening from prior pictures in  media.       Comments: Decreased ROM 2/2 pain/swelling   Skin:     General: Skin is warm and dry.   Neurological:      Mental Status: He is alert and oriented to person, place, and time.   Psychiatric:         Behavior: Behavior normal.             Significant Labs: All pertinent labs within the past 24 hours have been reviewed.  CBC:   Recent Labs   Lab 06/03/24  1830 06/04/24  0443   WBC 6.59 6.79   HGB 13.6* 13.2*   HCT 40.1 38.8*    197     CMP:   Recent Labs   Lab 06/03/24  1830 06/04/24  0443    138   K 3.9 3.8    106   CO2 25 25   GLU 90 98   BUN 18 16   CREATININE 1.1 1.0   CALCIUM 9.3 9.4   ANIONGAP 8 7*       Significant Imaging: I have reviewed all pertinent imaging results/findings within the past 24 hours.    Assessment/Plan:      * Cellulitis of right middle finger  54 year old male with no PMH who presents for evaluation of cellulitis with suspected septic arthritis of the 3rd digit of his right hand with progression of symptoms following a knife injury 9 days ago. At [facility] ED, patient was started on Vancomycin and received I&D of RT 3rd digit. ESR 29 and CRP 5.6. Afebrile and without leukocytosis.   -Continue Vancomycin and start on ceftriaxone   -Start MM pain management   -Cultures pending   - Ortho consulted:   - IV abx per primary  - Multimodal pain control  - f/u culture  - Ok for diet  - No acute orthopedic intervention anticipated at this time, will continue to follow    Mixed hyperlipidemia  - not on home med  - lipid panel pending   - no indication for statin at this time    Overweight  -BMI of 30.07      VTE Risk Mitigation (From admission, onward)           Ordered     Place sequential compression device  Until discontinued         06/04/24 0116     IP VTE LOW RISK PATIENT  Once         06/03/24 2326                    Discharge Planning   ARON: 6/6/2024     Code Status: Full Code   Is the patient medically ready for discharge?:     Reason for patient  still in hospital (select all that apply): Patient trending condition, Laboratory test, Treatment, Imaging, and Consult recommendations  Discharge Plan A: Home with family                  Vanessa Maldonado PA-C  Department of Hospital Medicine   [facility] 11[facility]

## 2024-06-04 NOTE — ASSESSMENT & PLAN NOTE
Theo Matthew Jr. is a 54 y.o. male with PMH of HLD presenting with right long finger pain and swelling to the dorsum of PIP joint after sustaining a laceration while cleaning a knife on 5/25/24. I&D performed at OSH with serosanguinous drainage. Cultures sent. Patient received 2g of Vancomycin prior to transfer. Denies fevers, chills or nightsweats. On exam, patient has tenderness over dorsum of PIP joint. No tenderness along flexor tendon sheath. No pain with ROM of MCP, PIP or DIP of right long finger. 1/4 Kanavel signs. WBC 6.59, CRP 5.9 and ESR 29. MRI consistent with cellulitis and mild extensor tenosynovitis. At this time, low concern for septic arthritis or flexor tenosynovitis. Patient improving with IV abx.     - IV abx per primary  - Multimodal pain control  - f/u culture  - Ok for diet  - No acute orthopedic intervention anticipated at this time, will continue to follow

## 2024-06-05 VITALS
TEMPERATURE: 98 F | HEART RATE: 70 BPM | BODY MASS INDEX: 29.94 KG/M2 | OXYGEN SATURATION: 98 % | SYSTOLIC BLOOD PRESSURE: 122 MMHG | WEIGHT: 186.31 LBS | DIASTOLIC BLOOD PRESSURE: 91 MMHG | HEIGHT: 66 IN | RESPIRATION RATE: 16 BRPM

## 2024-06-05 LAB
ANION GAP SERPL CALC-SCNC: 9 MMOL/L (ref 8–16)
BUN SERPL-MCNC: 18 MG/DL (ref 6–20)
CALCIUM SERPL-MCNC: 9.2 MG/DL (ref 8.7–10.5)
CHLORIDE SERPL-SCNC: 104 MMOL/L (ref 95–110)
CO2 SERPL-SCNC: 25 MMOL/L (ref 23–29)
CREAT SERPL-MCNC: 1 MG/DL (ref 0.5–1.4)
ERYTHROCYTE [DISTWIDTH] IN BLOOD BY AUTOMATED COUNT: 13 % (ref 11.5–14.5)
EST. GFR  (NO RACE VARIABLE): >60 ML/MIN/1.73 M^2
GLUCOSE SERPL-MCNC: 102 MG/DL (ref 70–110)
HCT VFR BLD AUTO: 42.2 % (ref 40–54)
HGB BLD-MCNC: 13.9 G/DL (ref 14–18)
MAGNESIUM SERPL-MCNC: 2.1 MG/DL (ref 1.6–2.6)
MCH RBC QN AUTO: 29.4 PG (ref 27–31)
MCHC RBC AUTO-ENTMCNC: 32.9 G/DL (ref 32–36)
MCV RBC AUTO: 89 FL (ref 82–98)
PLATELET # BLD AUTO: 214 K/UL (ref 150–450)
PMV BLD AUTO: 11.4 FL (ref 9.2–12.9)
POTASSIUM SERPL-SCNC: 4.1 MMOL/L (ref 3.5–5.1)
RBC # BLD AUTO: 4.73 M/UL (ref 4.6–6.2)
SODIUM SERPL-SCNC: 138 MMOL/L (ref 136–145)
VANCOMYCIN TROUGH SERPL-MCNC: 11.4 UG/ML (ref 10–22)
WBC # BLD AUTO: 6.39 K/UL (ref 3.9–12.7)

## 2024-06-05 PROCEDURE — 25000003 PHARM REV CODE 250: Performed by: INTERNAL MEDICINE

## 2024-06-05 PROCEDURE — 63600175 PHARM REV CODE 636 W HCPCS

## 2024-06-05 PROCEDURE — 85027 COMPLETE CBC AUTOMATED: CPT

## 2024-06-05 PROCEDURE — 25000003 PHARM REV CODE 250

## 2024-06-05 PROCEDURE — 63600175 PHARM REV CODE 636 W HCPCS: Performed by: INTERNAL MEDICINE

## 2024-06-05 PROCEDURE — 80202 ASSAY OF VANCOMYCIN: CPT | Performed by: INTERNAL MEDICINE

## 2024-06-05 PROCEDURE — 80048 BASIC METABOLIC PNL TOTAL CA: CPT

## 2024-06-05 PROCEDURE — 83735 ASSAY OF MAGNESIUM: CPT

## 2024-06-05 RX ORDER — DOXYCYCLINE 100 MG/1
100 CAPSULE ORAL 2 TIMES DAILY
Qty: 14 CAPSULE | Refills: 0 | Status: SHIPPED | OUTPATIENT
Start: 2024-06-06 | End: 2024-06-14

## 2024-06-05 RX ORDER — AMOXICILLIN AND CLAVULANATE POTASSIUM 875; 125 MG/1; MG/1
1 TABLET, FILM COATED ORAL EVERY 12 HOURS
Qty: 14 TABLET | Refills: 0 | Status: SHIPPED | OUTPATIENT
Start: 2024-06-06 | End: 2024-06-14

## 2024-06-05 RX ADMIN — VANCOMYCIN HYDROCHLORIDE 1000 MG: 1 INJECTION, POWDER, LYOPHILIZED, FOR SOLUTION INTRAVENOUS at 06:06

## 2024-06-05 RX ADMIN — CEFTRIAXONE 1 G: 1 INJECTION, POWDER, FOR SOLUTION INTRAMUSCULAR; INTRAVENOUS at 12:06

## 2024-06-05 NOTE — PROGRESS NOTES
"[facility]  Orthopedics  Progress Note    Patient Name: Theo Matthew Jr.  MRN: [id number]  Admission Date: 6/3/2024  Hospital Length of Stay: 1 days  Attending Provider: Philip Carrero MD  Primary Care Provider: Tenisha Becrera MD    Subjective:     Principal Problem:Cellulitis of right middle finger    Principal Orthopedic Problem: As above    Interval History: Patient seen and examined at bedside. NAEON. VSS. Patient doing well. Pain and ROM continue to improve. Denies fevers, chills or night sweats. Culture NGTD.      Review of patient's allergies indicates:  No Known Allergies    Current Facility-Administered Medications   Medication    acetaminophen tablet 1,000 mg    acetaminophen tablet 650 mg    albuterol-ipratropium 2.5 mg-0.5 mg/3 mL nebulizer solution 3 mL    aluminum-magnesium hydroxide-simethicone 200-200-20 mg/5 mL suspension 30 mL    bisacodyL suppository 10 mg    cefTRIAXone (Rocephin) 1 g in dextrose 5 % in water (D5W) 100 mL IVPB (MB+)    ketorolac injection 15 mg    melatonin tablet 6 mg    naloxone 0.4 mg/mL injection 0.02 mg    ondansetron disintegrating tablet 8 mg    polyethylene glycol packet 17 g    prochlorperazine injection Soln 5 mg    simethicone chewable tablet 80 mg    sodium chloride 0.9% flush 5 mL    traMADoL tablet 50 mg    vancomycin - pharmacy to dose    vancomycin 1,250 mg in dextrose 5 % (D5W) 250 mL IVPB (Vial-Mate)     Objective:     Vital Signs (Most Recent):  Temp: 98.4 °F (36.9 °C) (06/05/24 1138)  Pulse: 70 (06/05/24 1138)  Resp: 16 (06/05/24 1138)  BP: (!) 122/91 (06/05/24 1138)  SpO2: 98 % (06/05/24 1138) Vital Signs (24h Range):  Temp:  [97.7 °F (36.5 °C)-98.5 °F (36.9 °C)] 98.4 °F (36.9 °C)  Pulse:  [62-87] 70  Resp:  [16-20] 16  SpO2:  [93 %-98 %] 98 %  BP: (116-135)/(76-91) 122/91     Weight: 84.5 kg (186 lb 4.6 oz)  Height: 5' 6" (167.6 cm)  Body mass index is 30.07 kg/m².      Intake/Output Summary (Last 24 hours) at 6/5/2024 1139  Last data " filed at 6/5/2024 0543  Gross per 24 hour   Intake 240 ml   Output --   Net 240 ml        Ortho/SPM Exam  A&O x 3  Regular Rate  Non-Labored Respirations    LUE:   Swelling to dorsum of long finger  Healing incision over dorsoradial aspect of long finger  No pain with ROM of MCP, PIP or DIP joint  SILT  Compartments soft  Brisk cap refill  Radial artery palpated     Significant Labs: CBC:   Recent Labs   Lab 06/03/24  1830 06/04/24 0443 06/05/24  0431   WBC 6.59 6.79 6.39   HGB 13.6* 13.2* 13.9*   HCT 40.1 38.8* 42.2    197 214     CMP:   Recent Labs   Lab 06/03/24  1830 06/04/24 0443 06/05/24  0431    138 138   K 3.9 3.8 4.1    106 104   CO2 25 25 25   GLU 90 98 102   BUN 18 16 18   CREATININE 1.1 1.0 1.0   CALCIUM 9.3 9.4 9.2   ANIONGAP 8 7* 9     CRP:   Recent Labs   Lab 06/03/24  1830   CRP 5.6     All pertinent labs within the past 24 hours have been reviewed.    Significant Imaging: I have reviewed and interpreted all pertinent imaging results/findings.  Assessment/Plan:     * Cellulitis of right middle finger  Theo Matthew Jr. is a 54 y.o. male with PMH of HLD presenting with right long finger pain and swelling to the dorsum of PIP joint after sustaining a laceration while cleaning a knife on 5/25/24. I&D performed at OSH with serosanguinous drainage. Cultures sent. Patient received 2g of Vancomycin prior to transfer. Denies fevers, chills or nightsweats. On exam, patient has tenderness over dorsum of PIP joint. No tenderness along flexor tendon sheath. No pain with ROM of MCP, PIP or DIP of right long finger. 1/4 Kanavel signs. WBC 6.59, CRP 5.9 and ESR 29. MRI consistent with cellulitis and mild extensor tenosynovitis. At this time, low concern for septic arthritis or flexor tenosynovitis. Patient improving with IV abx.     - IV abx per primary  - Multimodal pain control  - Culture NGTD 6/5/24  - No acute orthopedic intervention anticipated at this time, will continue to  follow    Dispo: Ok from ortho perspective            ANKUR Guevara MD  Orthopedics  [facility] - Observation 11H

## 2024-06-05 NOTE — SUBJECTIVE & OBJECTIVE
"Principal Problem:Cellulitis of right middle finger    Principal Orthopedic Problem: As above    Interval History: Patient seen and examined at bedside. MOHAN JAIMES. Patient doing well. Pain and ROM continue to improve. Denies fevers, chills or night sweats. Culture NGTD.      Review of patient's allergies indicates:  No Known Allergies    Current Facility-Administered Medications   Medication    acetaminophen tablet 1,000 mg    acetaminophen tablet 650 mg    albuterol-ipratropium 2.5 mg-0.5 mg/3 mL nebulizer solution 3 mL    aluminum-magnesium hydroxide-simethicone 200-200-20 mg/5 mL suspension 30 mL    bisacodyL suppository 10 mg    cefTRIAXone (Rocephin) 1 g in dextrose 5 % in water (D5W) 100 mL IVPB (MB+)    ketorolac injection 15 mg    melatonin tablet 6 mg    naloxone 0.4 mg/mL injection 0.02 mg    ondansetron disintegrating tablet 8 mg    polyethylene glycol packet 17 g    prochlorperazine injection Soln 5 mg    simethicone chewable tablet 80 mg    sodium chloride 0.9% flush 5 mL    traMADoL tablet 50 mg    vancomycin - pharmacy to dose    vancomycin 1,250 mg in dextrose 5 % (D5W) 250 mL IVPB (Vial-Mate)     Objective:     Vital Signs (Most Recent):  Temp: 98.4 °F (36.9 °C) (06/05/24 1138)  Pulse: 70 (06/05/24 1138)  Resp: 16 (06/05/24 1138)  BP: (!) 122/91 (06/05/24 1138)  SpO2: 98 % (06/05/24 1138) Vital Signs (24h Range):  Temp:  [97.7 °F (36.5 °C)-98.5 °F (36.9 °C)] 98.4 °F (36.9 °C)  Pulse:  [62-87] 70  Resp:  [16-20] 16  SpO2:  [93 %-98 %] 98 %  BP: (116-135)/(76-91) 122/91     Weight: 84.5 kg (186 lb 4.6 oz)  Height: 5' 6" (167.6 cm)  Body mass index is 30.07 kg/m².      Intake/Output Summary (Last 24 hours) at 6/5/2024 1139  Last data filed at 6/5/2024 0543  Gross per 24 hour   Intake 240 ml   Output --   Net 240 ml        Ortho/SPM Exam  A&O x 3  Regular Rate  Non-Labored Respirations    LUE:   Swelling to dorsum of long finger  Healing incision over dorsoradial aspect of long finger  No pain with ROM " of MCP, PIP or DIP joint  SILT  Compartments soft  Brisk cap refill  Radial artery palpated     Significant Labs: CBC:   Recent Labs   Lab 06/03/24  1830 06/04/24  0443 06/05/24  0431   WBC 6.59 6.79 6.39   HGB 13.6* 13.2* 13.9*   HCT 40.1 38.8* 42.2    197 214     CMP:   Recent Labs   Lab 06/03/24  1830 06/04/24  0443 06/05/24  0431    138 138   K 3.9 3.8 4.1    106 104   CO2 25 25 25   GLU 90 98 102   BUN 18 16 18   CREATININE 1.1 1.0 1.0   CALCIUM 9.3 9.4 9.2   ANIONGAP 8 7* 9     CRP:   Recent Labs   Lab 06/03/24 1830   CRP 5.6     All pertinent labs within the past 24 hours have been reviewed.    Significant Imaging: I have reviewed and interpreted all pertinent imaging results/findings.

## 2024-06-05 NOTE — PLAN OF CARE
Problem: Adult Inpatient Plan of Care  Goal: Plan of Care Review  6/5/2024 1303 by Lorenzo Fagan RN  Outcome: Progressing  6/5/2024 1302 by Lorenzo Fagan RN  Outcome: Progressing  Goal: Patient-Specific Goal (Individualized)  6/5/2024 1303 by Lorenzo Fagan RN  Outcome: Progressing  6/5/2024 1302 by Lorenzo Fagan RN  Outcome: Progressing  Goal: Absence of Hospital-Acquired Illness or Injury  6/5/2024 1303 by Lorenzo Fagan RN  Outcome: Progressing  6/5/2024 1302 by Lorenzo Fagan RN  Outcome: Progressing  Goal: Optimal Comfort and Wellbeing  6/5/2024 1303 by Lorenzo Fagan RN  Outcome: Progressing  6/5/2024 1302 by Lorenzo Fagan RN  Outcome: Progressing  Goal: Readiness for Transition of Care  6/5/2024 1303 by Lorenzo Fagan RN  Outcome: Progressing  6/5/2024 1302 by Lorenzo aFgan RN  Outcome: Progressing     Problem: Infection  Goal: Absence of Infection Signs and Symptoms  6/5/2024 1303 by Lorenzo Fagan RN  Outcome: Progressing  6/5/2024 1302 by Lorenzo Fagan RN  Outcome: Progressing

## 2024-06-05 NOTE — DISCHARGE SUMMARY
[facility] [address] [place] [address] Medicine  Discharge Summary      Patient Name: Theo Matthew Jr.  MRN: [id number]  JU: [id number]  Patient Class: IP- Inpatient  Admission Date: 6/3/2024  Hospital Length of Stay: 1 days  Discharge Date and Time: 6/5/2024  1:09 PM  Attending Physician: Luz lEena att. providers found   Discharging Provider: Vanessa Maldonado PA-C  Primary Care Provider: Tenisha Becerra MD  [facility] Medicine Team: [facility] [place] [place] HOSP MED E Vanessa Maldonado PA-C  Primary Care Team: [facility] [place] [place] HOSP MED E    HPI:   Theo Matthew Jr. is a 54 year old male with no PMH who was transferred from [facility] for evaluation of cellulitis and suspicion of septic arthritis of the 3rd digit of his right hand. Patient states that he injured his finger with a hunting knife on 5/24/2024. He states that he applied Neosporin and tea tree oil to his injury and took Ibuprofen for pain, with temporary alleviation of symptoms. Three days later he went to the urgent care 2/2 his finger becoming erythematous with swelling spreading to the dorsal aspect of his right hand. He states that he received a steroid injection and was prescribed Bactrim, in which he noticed slight improvement in the swelling of his hand. He returned to  this afternoon after noticing discoloration of his finger and mucopurulent discharge. Patient was referred to the ED at [facility] where an incision and drainage was performed and received vancomycin prior to arrival. Patient currently reports a 5/10 throbbing pain in his RT 3rd digit and c/o limited flexion of his finger. He denies pain and limited ROM in his other fingers. He also denies fever, SOB, CP, HA, abdominal pain,  N/V/D/C. Tetanus up-to-date. He is on no home medications.    In ED: Pt is A&O X 4 and in NAD. VSS. /96. No leukocytosis. Hgb 13.6 with baseline ~ 14.2. ESR 29.CRP 5.6. X-Ray of hand completed with no acute fracture or dislocation. MRI of hand completed; findings c/w cellulitis and suspicion  of septic arthritis in RT 3rd digit. Orthopedics consulted. Placed in observation.     * No surgery found *      Hospital Course:   Theo Matthew Jr. Is a 54 y.o. male who was admitted to hospital medicine for cellulitis of his R middle finger. MRI with small joint effusion of the 3rd proximal interphalangeal joint, suspicious for septic arthritis. No evidence of acute osteomyelitis. Cellulitis of the dorsal 3rd digit with a tiny subcentimeter abscess. Small partial-thickness tear of the 3rd finger extensor tendon. Ortho consulted, low concern for septic arthritis. Continuing abx. Patient states that he is leaving for a cruise tomorrow and needs to be discharge. Feel as though patient would benefit from an additional day of IV abx. Discussed with patient the risk and benefits of leaving prior to being medically ready including but not limited to worsening infection, bacteremia, sepsis and/or death. Patient expressed understanding and signed out AMA.     Physical Exam  Gen: in NAD, appears stated age  CVS: RRR, no m/r/g  Resp: no belabored breathing or accessory muscle use appreciated   Abd: NTND, soft to palpation  Extrem: no UE or LE edema BL; continued swelling to R middle finger, limited ROM        Goals of Care Treatment Preferences:  Code Status: Full Code      Consults:   Consults (From admission, onward)          Status Ordering Provider     Inpatient consult to Midline team  Once        Provider:  (Not yet assigned)    Completed ALONSO ULRICH     Inpatient consult to Orthopedic Surgery  Once        Provider:  (Not yet assigned)    Completed ALEX FLOR            No new Assessment & Plan notes have been filed under this hospital service since the last note was generated.  Service: Hospital Medicine    Final Active Diagnoses:    Diagnosis Date Noted POA    PRINCIPAL PROBLEM:  Cellulitis of right middle finger [L03.011] 06/03/2024 Yes    Overweight [E66.3] 12/16/2019 Yes    Mixed hyperlipidemia [E78.2]  12/16/2019 Yes      Problems Resolved During this Admission:       Discharged Condition: stable    Disposition: Left Against Medical Adv*    Follow Up:    Patient Instructions:      Ambulatory referral/consult to Internal Medicine   Standing Status: Future   Referral Priority: Urgent Referral Type: Consultation   Referral Reason: Specialty Services Required   Referred to Provider: HEMANTH AVILES Requested Specialty: Internal Medicine   Number of Visits Requested: 1     Notify your health care provider if you experience any of the following:  temperature >100.4     Notify your health care provider if you experience any of the following:  severe uncontrolled pain     Activity as tolerated       Significant Diagnostic Studies: Labs: All labs within the past 24 hours have been reviewed    Pending Diagnostic Studies:       None           Medications:  Reconciled Home Medications:      Medication List        START taking these medications      amoxicillin-clavulanate 875-125mg 875-125 mg per tablet  Commonly known as: AUGMENTIN  Take 1 tablet by mouth every 12 (twelve) hours. for 7 days  Start taking on: June 6, 2024     doxycycline 100 MG Cap  Commonly known as: VIBRAMYCIN  Take 1 capsule (100 mg total) by mouth 2 (two) times daily. for 7 days  Start taking on: June 6, 2024            STOP taking these medications      sulfamethoxazole-trimethoprim 800-160mg 800-160 mg Tab  Commonly known as: BACTRIM DS              Indwelling Lines/Drains at time of discharge:   Lines/Drains/Airways       None                   Time spent on the discharge of patient: 36 minutes         Vanessa Maldonado PA-C  Department of Hospital Medicine  [facility]

## 2024-06-05 NOTE — ASSESSMENT & PLAN NOTE
Theo Matthew Jr. is a 54 y.o. male with PMH of HLD presenting with right long finger pain and swelling to the dorsum of PIP joint after sustaining a laceration while cleaning a knife on 5/25/24. I&D performed at OSH with serosanguinous drainage. Cultures sent. Patient received 2g of Vancomycin prior to transfer. Denies fevers, chills or nightsweats. On exam, patient has tenderness over dorsum of PIP joint. No tenderness along flexor tendon sheath. No pain with ROM of MCP, PIP or DIP of right long finger. 1/4 Kanavel signs. WBC 6.59, CRP 5.9 and ESR 29. MRI consistent with cellulitis and mild extensor tenosynovitis. At this time, low concern for septic arthritis or flexor tenosynovitis. Patient improving with IV abx.     - IV abx per primary  - Multimodal pain control  - Culture NGTD 6/5/24  - No acute orthopedic intervention anticipated at this time, will continue to follow    Dispo: Ok from ortho perspective

## 2024-06-05 NOTE — PROGRESS NOTES
Pharmacokinetic Assessment Follow Up: IV Vancomycin    Vancomycin serum concentration assessment(s):    The trough level was drawn correctly and can be used to guide therapy at this time.   The measurement is below the desired definitive target range of 15 to 20 mcg/mL.    Vancomycin Regimen Plan:    Change regimen to Vancomycin 1250 mg IV every 12 hours   Next serum trough concentration measured at 1730 prior to 4th dose on 6/6    Drug levels (last 3 results):  Recent Labs   Lab Result Units 06/05/24  0431   Vancomycin-Trough ug/mL 11.4       Pharmacy will continue to follow and monitor vancomycin.    Please contact pharmacy at extension [phone] for questions regarding this assessment.    Thank you for the consult,   Philip Kline       Patient brief summary:  Theo Matthew Jr. is a 54 y.o. male initiated on antimicrobial therapy with IV Vancomycin for treatment of bone/joint infection    The patient's current regimen is Vancomycin 1250 mg every 12 hours    Drug Allergies:   Review of patient's allergies indicates:  No Known Allergies    Actual Body Weight:   84.5 kg    Renal Function:   Estimated Creatinine Clearance: 85.1 mL/min (based on SCr of 1 mg/dL).,     Dialysis Method (if applicable):  N/A    CBC (last 72 hours):  Recent Labs   Lab Result Units 06/03/24 1830 06/04/24 0443 06/05/24  0431   WBC K/uL 6.59 6.79 6.39   Hemoglobin g/dL 13.6* 13.2* 13.9*   Hemoglobin A1C %  --  5.8*  --    Hematocrit % 40.1 38.8* 42.2   Platelets K/uL 209 197 214   Gran % % 50.3  --   --    Lymph % % 39.6  --   --    Mono % % 8.2  --   --    Eosinophil % % 1.4  --   --    Basophil % % 0.5  --   --    Differential Method  Automated  --   --        Metabolic Panel (last 72 hours):  Recent Labs   Lab Result Units 06/03/24  1830 06/04/24 0443 06/05/24  0431   Sodium mmol/L 139 138 138   Potassium mmol/L 3.9 3.8 4.1   Chloride mmol/L 106 106 104   CO2 mmol/L 25 25 25   Glucose mg/dL 90 98 102   BUN mg/dL 18 16 18   Creatinine mg/dL  1.1 1.0 1.0   Magnesium mg/dL  --  2.1 2.1       Vancomycin Administrations:  vancomycin given in the last 96 hours                     vancomycin (VANCOCIN) 1,000 mg in dextrose 5 % (D5W) 250 mL IVPB (Vial-Mate) (mg) 1,000 mg New Bag 06/05/24 0629     1,000 mg New Bag 06/04/24 1810     1,000 mg New Bag  0617    vancomycin 2 g in 0.9% sodium chloride 500 mL IVPB (mg) 2,000 mg New Bag 06/03/24 1835                    Microbiologic Results:  Microbiology Results (last 7 days)       ** No results found for the last 168 hours. **

## 2024-06-05 NOTE — PLAN OF CARE
06/05/24 1324   Final Note   Assessment Type Final Discharge Note   Anticipated Discharge Disposition Home   Hospital Resources/Appts/Education Provided Provided patient/caregiver with written discharge plan information   Post-Acute Status   Patient choice form signed by patient/caregiver List with quality metrics by geographic area provided   Discharge Delays None known at this time     [facility]  Discharge Final Note    Primary Care Provider: Tenisha Becerra MD    Expected Discharge Date: 6/5/2024    Patient cleared for discharge from case management standpoint.     Discharge Plan A and Plan B have been determined by review of patient's clinical status, future medical and therapeutic needs, and coverage/benefits for post-acute care in coordination with multidisciplinary team members.        Final Discharge Note (most recent)       Final Note - 06/05/24 1324          Final Note    Assessment Type Final Discharge Note (P)      Anticipated Discharge Disposition Home or Self Care (P)      Hospital Resources/Appts/Education Provided Provided patient/caregiver with written discharge plan information (P)         Post-Acute Status    Patient choice form signed by patient/caregiver List with quality metrics by geographic area provided (P)      Discharge Delays None known at this time (P)                      Important Message from Medicare                 Future Appointments   Date Time Provider Department Center   6/11/2024  9:00 AM Geovanna Garnett, NP [facility] [place]       SW scheduled post-discharge follow-up appointment and information added to AVS.     Kirti Bella, GINA  [facility]  Ext. [phone]

## 2024-06-05 NOTE — PLAN OF CARE
Problem: Adult Inpatient Plan of Care  Goal: Plan of Care Review  6/5/2024 1303 by Lorenzo Fagan RN  Outcome: Met  6/5/2024 1303 by Lorenzo Fagan RN  Outcome: Progressing  6/5/2024 1302 by Lorenzo Fagan RN  Outcome: Progressing     Problem: Adult Inpatient Plan of Care  Goal: Patient-Specific Goal (Individualized)  6/5/2024 1303 by Lorenzo Fagan RN  Outcome: Met  6/5/2024 1303 by Lorenzo Fagan RN  Outcome: Progressing  6/5/2024 1302 by Lorenzo Fagan RN  Outcome: Progressing     Problem: Adult Inpatient Plan of Care  Goal: Absence of Hospital-Acquired Illness or Injury  6/5/2024 1303 by Lorenzo Fagan RN  Outcome: Met  6/5/2024 1303 by Lorenzo Fagan RN  Outcome: Progressing  6/5/2024 1302 by Lorenzo Fagan RN  Outcome: Progressing     Problem: Adult Inpatient Plan of Care  Goal: Optimal Comfort and Wellbeing  6/5/2024 1303 by Lorenzo Fagan RN  Outcome: Met  6/5/2024 1303 by Lorenzo Fagan RN  Outcome: Progressing  6/5/2024 1302 by Lorenzo Fagan RN  Outcome: Progressing     Problem: Adult Inpatient Plan of Care  Goal: Readiness for Transition of Care  6/5/2024 1303 by Lorenzo Fagan RN  Outcome: Met     Problem: Infection  Goal: Absence of Infection Signs and Symptoms  6/5/2024 1303 by Lorenzo Fagan RN  Outcome: Met  6/5/2024 1303 by Lorenzo Fagan RN  Outcome: Progressing  6/5/2024 1302 by Lorenzo Fagan RN  Outcome: Progressing     Problem: Fall Injury Risk  Goal: Absence of Fall and Fall-Related Injury  6/5/2024 1303 by Lorenzo Fagan RN  Outcome: Met  6/5/2024 1303 by Lorenzo Fagan RN  Outcome: Progressing  6/5/2024 1302 by Lorenzo Fagan RN  Outcome: Progressing   Applied clean dressing, informed to keep clean and dry additional dressing given with tegaderm dressing

## 2024-06-05 NOTE — CONSULTS
MARY consulted for PIV insertion in real time using ultrasound guidance.    Indication: PVA  Gauge and length: 20 g x 1 3/4 inch   Location: [facility]

## 2024-06-07 ENCOUNTER — TELEPHONE (OUTPATIENT)
Dept: ORTHOPEDICS | Facility: CLINIC | Age: 54
End: 2024-06-07
Payer: COMMERCIAL

## 2024-06-07 NOTE — TELEPHONE ENCOUNTER
right long finger PIPJ cellulitis and extensor tenosynovitis  LVM and sent mychart message for outpatient follow up.

## 2024-06-10 ENCOUNTER — TELEPHONE (OUTPATIENT)
Dept: ORTHOPEDICS | Facility: CLINIC | Age: 54
End: 2024-06-10

## 2024-06-10 ENCOUNTER — OFFICE VISIT (OUTPATIENT)
Dept: ORTHOPEDICS | Facility: CLINIC | Age: 54
End: 2024-06-10
Payer: COMMERCIAL

## 2024-06-10 ENCOUNTER — HOSPITAL ENCOUNTER (OUTPATIENT)
Dept: RADIOLOGY | Facility: HOSPITAL | Age: 54
Discharge: HOME | End: 2024-06-10
Attending: ORTHOPAEDIC SURGERY
Payer: COMMERCIAL

## 2024-06-10 ENCOUNTER — LAB VISIT (OUTPATIENT)
Dept: LAB | Facility: HOSPITAL | Age: 54
End: 2024-06-10
Attending: ORTHOPAEDIC SURGERY
Payer: COMMERCIAL

## 2024-06-10 DIAGNOSIS — M79.644 FINGER PAIN, RIGHT: ICD-10-CM

## 2024-06-10 DIAGNOSIS — M79.644 FINGER PAIN, RIGHT: Primary | ICD-10-CM

## 2024-06-10 DIAGNOSIS — M65.849 EXTENSOR TENOSYNOVITIS OF FINGER: ICD-10-CM

## 2024-06-10 DIAGNOSIS — S66.929A LACERATION OF TENDON OF FINGER: ICD-10-CM

## 2024-06-10 DIAGNOSIS — L03.011 CELLULITIS OF RIGHT MIDDLE FINGER: Primary | ICD-10-CM

## 2024-06-10 DIAGNOSIS — L03.011 CELLULITIS OF RIGHT MIDDLE FINGER: ICD-10-CM

## 2024-06-10 LAB
BASOPHILS # BLD AUTO: 0.04 K/UL (ref 0–0.2)
BASOPHILS NFR BLD: 0.6 % (ref 0–1.9)
CRP SERPL-MCNC: 3.4 MG/L (ref 0–8.2)
DIFFERENTIAL METHOD BLD: ABNORMAL
EOSINOPHIL # BLD AUTO: 0.1 K/UL (ref 0–0.5)
EOSINOPHIL NFR BLD: 1.1 % (ref 0–8)
ERYTHROCYTE [DISTWIDTH] IN BLOOD BY AUTOMATED COUNT: 13.2 % (ref 11.5–14.5)
ERYTHROCYTE [SEDIMENTATION RATE] IN BLOOD BY PHOTOMETRIC METHOD: 30 MM/HR (ref 0–23)
HCT VFR BLD AUTO: 41.7 % (ref 40–54)
HGB BLD-MCNC: 13.9 G/DL (ref 14–18)
IMM GRANULOCYTES # BLD AUTO: 0.01 K/UL (ref 0–0.04)
IMM GRANULOCYTES NFR BLD AUTO: 0.1 % (ref 0–0.5)
LYMPHOCYTES # BLD AUTO: 3.3 K/UL (ref 1–4.8)
LYMPHOCYTES NFR BLD: 46.8 % (ref 18–48)
MCH RBC QN AUTO: 29.7 PG (ref 27–31)
MCHC RBC AUTO-ENTMCNC: 33.3 G/DL (ref 32–36)
MCV RBC AUTO: 89 FL (ref 82–98)
MONOCYTES # BLD AUTO: 0.6 K/UL (ref 0.3–1)
MONOCYTES NFR BLD: 9.1 % (ref 4–15)
NEUTROPHILS # BLD AUTO: 3 K/UL (ref 1.8–7.7)
NEUTROPHILS NFR BLD: 42.3 % (ref 38–73)
NRBC BLD-RTO: 0 /100 WBC
PLATELET # BLD AUTO: 256 K/UL (ref 150–450)
PMV BLD AUTO: 11.7 FL (ref 9.2–12.9)
RBC # BLD AUTO: 4.68 M/UL (ref 4.6–6.2)
WBC # BLD AUTO: 7.01 K/UL (ref 3.9–12.7)

## 2024-06-10 PROCEDURE — 99999 PR PBB SHADOW E&M-EST. PATIENT-LVL IV: CPT | Mod: PBBFAC,,, | Performed by: ORTHOPAEDIC SURGERY

## 2024-06-10 PROCEDURE — 36415 COLL VENOUS BLD VENIPUNCTURE: CPT | Performed by: ORTHOPAEDIC SURGERY

## 2024-06-10 PROCEDURE — 73140 X-RAY EXAM OF FINGER(S): CPT | Mod: TC,RT

## 2024-06-10 PROCEDURE — 85652 RBC SED RATE AUTOMATED: CPT | Performed by: ORTHOPAEDIC SURGERY

## 2024-06-10 PROCEDURE — 73140 X-RAY EXAM OF FINGER(S): CPT | Mod: 26,RT,, | Performed by: RADIOLOGY

## 2024-06-10 PROCEDURE — 1111F DSCHRG MED/CURRENT MED MERGE: CPT | Mod: CPTII,S$GLB,, | Performed by: ORTHOPAEDIC SURGERY

## 2024-06-10 PROCEDURE — 86140 C-REACTIVE PROTEIN: CPT | Performed by: ORTHOPAEDIC SURGERY

## 2024-06-10 PROCEDURE — 3044F HG A1C LEVEL LT 7.0%: CPT | Mod: CPTII,S$GLB,, | Performed by: ORTHOPAEDIC SURGERY

## 2024-06-10 PROCEDURE — 99215 OFFICE O/P EST HI 40 MIN: CPT | Mod: S$GLB,,, | Performed by: ORTHOPAEDIC SURGERY

## 2024-06-10 PROCEDURE — 85025 COMPLETE CBC W/AUTO DIFF WBC: CPT | Performed by: ORTHOPAEDIC SURGERY

## 2024-06-10 NOTE — PROGRESS NOTES
[facility] Note    Chief Complaint      Chief Complaint   Patient presents with    Follow-up     Groin pain       History of Present Illness      Theo Matthew Jr. is a 54 y.o. male with chronic conditions of HLD who presents today for: hospital follow up for cellulitis v septic arthritis of the 3rd digit of his right hand 2/2 to laceration from a fishing knife on 5.25.24. ER record reviewed below. Discharged on doxycycline and amoxicillin.  Pt already had follow up with Dr. Chamberlain. Has Tenosynovectomy, extensor tendon long finger today with Dr. Chamberlain at 11. No fever or chills.     Pt notice when got IVF/IV ABX in hospital, had more urinary urgency/frequency. Has had accidents bought depends. Last PSA 33 (5/24) interested in MRI of prostate now, still not interested in seeing Urology, but agreeable to do MRI and make a decision from there.     Hospital Course:   Theo Matthew Jr. Is a 54 y.o. male who was admitted to hospital medicine for cellulitis of his R middle finger. MRI with small joint effusion of the 3rd proximal interphalangeal joint, suspicious for septic arthritis. No evidence of acute osteomyelitis. Cellulitis of the dorsal 3rd digit with a tiny subcentimeter abscess. Small partial-thickness tear of the 3rd finger extensor tendon. Ortho consulted, low concern for septic arthritis. Continuing abx. Patient states that he is leaving for a cruise tomorrow and needs to be discharge. Feel as though patient would benefit from an additional day of IV abx. Discussed with patient the risk and benefits of leaving prior to being medically ready including but not limited to worsening infection, bacteremia, sepsis and/or death. Patient expressed understanding and signed out AMA.     Past Medical History:  Past Medical History:   Diagnosis Date    Internal hemorrhoid        Past Surgical History:   has a past surgical history that includes Knee ligament reconstruction (Right); Colonoscopy  (02/18/2020); Colonoscopy (N/A, 2/18/2020); Tenosynovectomy (Right, 6/11/2024); Irrigation and debridement of upper extremity (Right, 6/11/2024); Repair of extensor tendon (Right, 6/11/2024); and tenolysis, extensor, hand or finger (Right, 6/11/2024).    Family History:  family history includes Alcohol abuse in his father; Arthritis in his paternal grandmother; Cancer in his maternal grandmother; Diabetes in his maternal grandfather; Hypertension in his mother; No Known Problems in his paternal grandfather and sister; Seizures in his father.     Social History:  Social History     Tobacco Use    Smoking status: Never    Smokeless tobacco: Never   Substance Use Topics    Alcohol use: No    Drug use: No       Review of Systems   Constitutional:  Negative for chills and fever.   Respiratory:  Negative for cough and shortness of breath.    Cardiovascular:  Negative for chest pain and palpitations.   Gastrointestinal:  Negative for constipation, diarrhea, nausea and vomiting.   Genitourinary:  Positive for frequency and urgency. Negative for dysuria and hematuria.   Musculoskeletal:  Negative for falls.   Neurological:  Negative for headaches.        Medications:  Outpatient Encounter Medications as of 6/11/2024   Medication Sig Dispense Refill    amoxicillin-clavulanate 875-125mg (AUGMENTIN) 875-125 mg per tablet Take 1 tablet by mouth every 12 (twelve) hours. for 7 days 14 tablet 0    doxycycline (VIBRAMYCIN) 100 MG Cap Take 1 capsule (100 mg total) by mouth 2 (two) times daily. for 7 days 14 capsule 0     No facility-administered encounter medications on file as of 6/11/2024.       Allergies:  Review of patient's allergies indicates:  No Known Allergies    Health Maintenance:  Immunization History   Administered Date(s) Administered    Tdap 08/22/2016      Health Maintenance   Topic Date Due    Shingles Vaccine (1 of 2) Never done    TETANUS VACCINE  08/22/2026    Lipid Panel  06/04/2029    Colorectal Cancer Screening  " 02/18/2030    Hepatitis C Screening  Completed        Physical Exam      Vital Signs  Pulse: 77  SpO2: 98 %  BP: 130/80  BP Location: Right arm  Patient Position: Sitting  Height and Weight  Height: 5' 6" (167.6 cm)  Weight: 83.1 kg (183 lb 3.2 oz)  BSA (Calculated - sq m): 1.97 sq meters  BMI (Calculated): 29.6  Weight in (lb) to have BMI = 25: 154.6]    Physical Exam  Constitutional:       Appearance: He is well-developed.   HENT:      Head: Normocephalic and atraumatic.   Neck:      Thyroid: No thyromegaly.   Cardiovascular:      Rate and Rhythm: Normal rate and regular rhythm.      Heart sounds: No murmur heard.  Pulmonary:      Effort: Pulmonary effort is normal. No respiratory distress.      Breath sounds: Normal breath sounds.   Abdominal:      Palpations: Abdomen is soft.   Musculoskeletal:         General: Swelling and tenderness (3rd digit swelling) present.   Skin:     General: Skin is warm and dry.   Neurological:      Mental Status: He is alert and oriented to person, place, and time.   Psychiatric:         Behavior: Behavior normal.          Laboratory:  CBC:  Recent Labs   Lab 06/04/24  0443 06/05/24  0431 06/10/24  1540   WBC 6.79 6.39 7.01   RBC 4.39 L 4.73 4.68   Hemoglobin 13.2 L 13.9 L 13.9 L   Hematocrit 38.8 L 42.2 41.7   Platelets 197 214 256   MCV 88 89 89   MCH 30.1 29.4 29.7   MCHC 34.0 32.9 33.3     CMP:  Recent Labs   Lab 12/08/21  0924 03/21/23  0837 05/02/24  1224 06/03/24  1830 06/05/24  0431   Glucose 102 95 97   < > 102   Calcium 9.8 9.8 9.7   < > 9.2   Albumin 4.8 4.2 4.1  --   --    Total Protein 8.2 7.6 7.8  --   --    Sodium 144 140 139   < > 138   Potassium 4.7 3.9 4.0   < > 4.1   CO2 30 H 29 28   < > 25   Chloride 101 104 104   < > 104   BUN 15 14 14   < > 18   Alkaline Phosphatase 59 61 60  --   --    ALT 28 20 19  --   --    AST 36 23 21  --   --    Total Bilirubin 0.6 0.6 0.5  --   --     < > = values in this interval not displayed.     URINALYSIS:  Recent Labs   Lab " 05/02/24  1308 06/11/24  1000   Color, UA Yellow Yellow   Specific Gravity, UA 1.025 1.020   pH, UA 6.0 6.0   Protein, UA Trace A Trace A   Bacteria Rare  --    Nitrite, UA Negative Negative   Leukocytes, UA Negative Negative      LIPIDS:  Recent Labs   Lab 03/21/23  0837 06/04/24  0443   HDL 40 31 L   Cholesterol 181 167   Triglycerides 77 117   LDL Cholesterol 125.6 112.6   HDL/Cholesterol Ratio 22.1 18.6 L   Non-HDL Cholesterol 141 136   Total Cholesterol/HDL Ratio 4.5 5.4 H     TSH:      A1C:  Recent Labs   Lab 03/21/23  0837 06/04/24  0443   Hemoglobin A1C 5.9 H 5.8 H       Assessment/Plan     Theo Matthew Jr. is a 54 y.o.male with:    1. Penetrating wound of finger, subsequent encounter  Has surgery this morning with Dr. Chamberlain.     2. Cellulitis of right middle finger  Stable. Continue current meds.      3. Elevated PSA  - MRI Prostate W W/O Contrast; Future  - PSA has continuously increased and patient symptomatic. Worrisome findings. encouraged Urology referral, pt refused at this time. Agreeable to MRI.     4. Dysuria  - Urinalysis; Future  - CULTURE, URINE; Future       Chronic conditions status updated as per HPI.  Other than changes above, cont current medications and maintain follow up with specialists.  No follow-ups on file.    Future Appointments   Date Time Provider Department Center   6/14/2024  9:00 AM Chrissy Snowden, FERNANDO [facility] OP [facility]2 [place] 2 fl   6/14/2024 10:00 AM Baumgarten, Katherine L., MD [facility] ID [address]   6/24/2024 10:15 AM [facility]   7/5/2024 10:00 AM OC  MRI1 [facility] MRI [place]   7/22/2024  9:00 AM Maryjo Chamberlain MD [facility]         Adrienne Cotaya, FNP [facility]

## 2024-06-10 NOTE — TELEPHONE ENCOUNTER
If you are diabetic, DO NOT take any diabetic medication the night before surgery or morning of surgery. If you are type I diabetic on an insulin pump, please bring your monitor the morning of surgery.   NA     MUST HOLD SEMAGLUTIDE MEDICATIONS 7 DAYS PRIOR TO SURGERY, examples: Mounjaro, Ozempic, Trulicity.   NA     If you have high blood pressure please take your medication in the morning like normal with a SIP OF WATER; with the exception of any Angiotensin receptor blocker (end in sartan) and ACE inhibitors (end in pril).  NA     If you are taking blood thinners (Aspirin, Eliquis, Lovenox, Coumadin, etc), our office will contact the prescribing physician for guidance on when you are to stop/re-start your blood thinner medication.   NA     Other medications to dose with a SIP OF WATER AM of surgery:   NA     Other Important Medication Instructions:   HOLD ORAL ANTIBIOTICS THIS EVENING AND TOMORROW AM

## 2024-06-10 NOTE — PATIENT INSTRUCTIONS
[facility]  HAND CLINIC SURGERY INSTRUCTIONS  Maryjo Chamberlain MD  Date of Surgery: 6.11.24    Location of Surgery:      [x] [place]    [address] [place] [address]    [] [facility]  [address] [place] [address]  *Pre Op Anesthesia Clinic: [phone]* You will need to contact this department to schedule a telephone or in person visit prior to your surgery date.    [] [facility]  [address] [place] [address]    PRE-OPERATIVE INSTRUCTIONS:    Dr. Chamberlain's clinic staff will call you THE DAY BEFORE SURGERY with your arrival time. You will be notified in the afternoon between 3:00p-5:00pm. We will attempt to provide your arrival time earlier and will call you if this is at all possible.     DO NOT eat or drink after midnight, this includes gum/hard candy, coffee.   You may have ONLY SMALL sips of WATER the morning of surgery to take your medication, NOTHING ELSE.    You ARE NOT to drive or take an Uber/Lyft/taxi home from surgery. Please arrange a friend/family member to accompany you at the surgery center and drive you home.  Transportation: wife    PLEASE REMOVE nail polish and/or artificial nails prior to your surgery date if applicable. NA    PRE-OPERATIVE MEDICATION INSTRUCTIONS:    If you are diabetic, DO NOT take any diabetic medication the night before surgery or morning of surgery. If you are type I diabetic on an insulin pump, please bring your monitor the morning of surgery.   NA    MUST HOLD SEMAGLUTIDE MEDICATIONS 7 DAYS PRIOR TO SURGERY, examples: Mounjaro, Ozempic, Trulicity.   NA    If you have high blood pressure please take your medication in the morning like normal with a SIP OF WATER; with the exception of any Angiotensin receptor blocker (end in sartan) and ACE inhibitors (end in pril).  NA    If you are taking blood thinners (Aspirin, Eliquis,  Lovenox, Coumadin, etc), our office will contact the prescribing physician for guidance on when you are to stop/re-start your blood thinner medication.   NA    Other medications to dose with a SIP OF WATER AM of surgery:   NA    Other Important Medication Instructions:   HOLD ORAL ANTIBIOTICS THIS EVENING AND TOMORROW AM    Please HOLD Vitamins 1-7 days prior to surgery, CONTINUE Vitamin D up until the AM of your surgery.    Avoid anti-inflammatory medications 5-7 days before your surgery. This includes Aleve/Naproxen, Celebrex, Meloxicam/Mobic, Voltaren/Diclofenac.   You may dose ibuprofen/Advil/Motrin up until the day before your surgery.  You may take extra strength Tylenol/Acetaminophen.    HOLD ALL OTHER MEDICATIONS AM OF SURGERY THAT ARE NOT DISCUSSED ABOVE        POST-OPERATIVE MEDICATION INSTRUCTIONS:    Your post-operative pain medication will be DELIVERED BEDSIDE to you at the surgery center by the [facility]. You DO NOT need to pick this medication up from the [facility].     TAKE post-operative pain medication as directed.   You may also take over-the-counter extra strength Tylenol/acetaminophen as directed on the bottle for breakthrough pain between dosed of prescribed pain medication.   Please note, some pain medications contain Tylenol/acetaminophen.   DO NOT exceed 3000mg of Tylenol/acetaminophen in 1 day.  You may also use an over-the-counter anti-inflammatory medication also known as an NSAID,  (Aleve/Naproxen) as directed on the bottle for breakthrough pain between doses of prescribed pain medication.  DO NOT take NSAIDs if you have been previously instructed not to by a physician.     POST-OPERATIVE INSTRUCTIONS:    DO NOT let your operative area become wet, Your dressings/bandages/splints must remain dry.   Recommend waterproof arm cast cover to be worn when showering and bathing and can be purchased on Amazon. Garbage bags, plastic shopping bags, or umbrella bags can also be used  instead.    DO NOT remove any post-operative dressings/bandages/splints until you are seen by Dr. Castillo or Occupational Therapy   These dressings/bandages/splints are in place to keep the operative site clean, dry, and in optimal healing position     ELEVATE your hand/arm above the level of your heart as much as possible to decrease post-operative swelling for first 48 hours.  Swelling after surgery is TO BE EXPECTED.      ICE the operative site following surgery for 20-30 minutes, 4-5 times per day.  Be sure to have a towel between your dressings/bandages/splint to keep from getting wet.    MOVE the parts of your hand/arm that are not immobilized in a sling or splint.   Make a gentle fist with your fingers, bend/straighten your elbow, etc.   DO NOT attempt any strengthening exercises (hand putty, exercise balls, etc) on your operative side as this can increase swelling.     *CALL the [facility] at [phone]*  If at any time following surgery your dressings/bandages/splints: get wet, come loose, feels tight, feels uncomfortable.  Or if you are concerned about possible infection, worsening pain, worsening swelling or have any other concerns regarding your surgery.   Signs of infection:  fever (over 100.3), chills, body aches, increased warmth, redness, significant increase in swelling & pain at surgical site.     OUTPATIENT FOLLOW UP:  YOU WILL BE SEEN BY DR. CASTILLO IN CLINIC TBD WEEKS AFTER SURGERY *Your splint/soft dressing will be removed as well as your sutures if applicable. Will determine if OT is needed.    FMLA or Disability paperwork can be sent to [facility]  *Only needed if you are going to be out of work 2 weeks or more*  (P) [phone] (F) [phone] or email [email or web address]

## 2024-06-10 NOTE — H&P (VIEW-ONLY)
[facility]  History & Physical  Orthopedics    SUBJECTIVE:      Chief Complaint:   Chief Complaint   Patient presents with    Right Hand - Pain, Swelling       Referring Provider: ANKUR Guevara MD     History of Present Illness:    Patient is a 54 y.o. right hand dominant male PMH of HLD who presents today following ED admission for 2 days due to right long finger dorsal cellulitis caused by a laceration from a fishing knife 5.25.24.  I&D was performed by an outside hospital with serosanguineous drainage and cultures were sent.  He was transferred to [facility] on 06/03/2024 and MRI showed cellulitis and mild extensor tenosynovitis.  He received IV antibiotics while inpatient.  He was discharged on oral doxycycline and amoxicillin.  Pain 2/10 today right long finger PIP joint is swollen with dorsal healing wound.     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  The patient denies any fevers, chills, N/V, D/C and presents for evaluation.    Past Medical History:   Diagnosis Date    Internal hemorrhoid      Past Surgical History:   Procedure Laterality Date    COLONOSCOPY  02/18/2020    COLONOSCOPY N/A 2/18/2020    Procedure: COLONOSCOPY/Suprep;  Surgeon: Agueda Jacques MD;  Location: [facility];  Service: Endoscopy;  Laterality: N/A;    KNEE LIGAMENT RECONSTRUCTION Right      Review of patient's allergies indicates:  No Known Allergies  Social History     Social History Narrative    Not on file     Family History   Problem Relation Name Age of Onset    Seizures Father Theo Matthew Sr     Alcohol abuse Father Theo Matthew Sr     Hypertension Mother Mom     No Known Problems Sister      Cancer Maternal Grandmother Chen Hernandez         passed 83 pancreate cancer    Diabetes Maternal Grandfather Lory Reeves     Arthritis Paternal  Grandmother Lillian Boggs     No Known Problems Paternal Grandfather           Current Outpatient Medications:     amoxicillin-clavulanate 875-125mg (AUGMENTIN) 875-125 mg per tablet, Take 1 tablet by mouth every 12 (twelve) hours. for 7 days, Disp: 14 tablet, Rfl: 0    doxycycline (VIBRAMYCIN) 100 MG Cap, Take 1 capsule (100 mg total) by mouth 2 (two) times daily. for 7 days, Disp: 14 capsule, Rfl: 0    ROS    Review of Systems:  Constitutional: no fever or chills  Eyes: no visual changes  ENT: no nasal congestion or sore throat  Respiratory: no cough or shortness of breath  Cardiovascular: no chest pain  Gastrointestinal: no nausea or vomiting, tolerating diet  Musculoskeletal: pain, soreness, decreased ROM, and laceration    OBJECTIVE:      Vital Signs (Most Recent):  There were no vitals filed for this visit.  There is no height or weight on file to calculate BMI.    Physical Exam    Physical Exam:  Constitutional: The patient appears well-developed and well-nourished. No distress.   Head: Normocephalic and atraumatic.   Nose: Nose normal.   Eyes: Conjunctivae and EOM are normal.   Neck: No tracheal deviation present.   Cardiovascular: Normal rate and intact distal pulses.    Pulmonary/Chest: Effort normal. No respiratory distress.   Abdominal: There is no guarding.   Lymphatic: Negative for adenopathy   Neurological: The patient is alert.   Psychiatric: The patient has a normal mood and affect.     Bilateral Hand/Wrist Examination:    Observation/Inspection:  Swelling  Right long dorsum finger    Deformity  none  Discoloration  none     Scars   none    Atrophy  none    HAND/WRIST EXAMINATION:  Right hand: 7 mm laceration over dorsoradial aspect of long finger, significant swelling to PIP joint, decreased AROM/PROM, tender to palpation at joint line and extensor tendon, nontender to flexor tendon palpation or movement. otherwise ROM hand/wrist/elbow full  Physical Exam         Neurovascular Exam:  Digits WWP,  brisk CR < 3s throughout  NVI motor/LTS to M/R/U nerves, radial pulse 2+  2+ biceps and brachioradialis reflexes    Diagnostic studies and other clinical records review:  Results  Laboratory Studies  Sed rate was elevated     X-rays AP, lateral and oblique right long finger today are independently reviewed by me and shows soft tissue swelling, no bony changes.    MRI hand/fingers without contrast 6.3.24 impression:   1. Small joint effusion of the 3rd proximal interphalangeal joint, suspicious for septic arthritis.  No evidence of acute osteomyelitis.  2. Cellulitis of the dorsal 3rd digit with a tiny subcentimeter abscess.  3. Small partial-thickness tear of the 3rd finger extensor tendon.      ASSESSMENT/PLAN:    54 y.o. yo male with   Encounter Diagnoses   Name Primary?    Cellulitis of right middle finger     Laceration of tendon of finger Yes    Extensor tenosynovitis of finger     Finger pain, right       Assessment & Plan  1.  Plan: The patient and I had a thorough discussion today. We discussed the working diagnosis as well as several other potential alternative diagnoses. Treatment options were discussed, both conservative and surgical. Conservative treatment options would include things such as activity modifications, antibiotics, oral vs topical OTC and prescription anti-inflammatory medications, occupational therapy, splinting/bracing, immobilization and others. Surgical options were discussed as well. New blood work was ordered.    At this time, the patient has exhausted conservative measures and would like to proceed with surgery. Surgery would include right long finger irrigation and debridement, right long finger arthrotomy, right long finger extensor tendon repair, right long finger extensor tenosynovectomy . Consent signed today in clinic. Light use of the hand will be indicated for the first 4-6 weeks     We have discussed risks, benefits and alternatives of hand surgery which include but are not  limited to blood clots in the legs that can travel to the lungs (pulmonary embolism). Pulmonary embolism can cause shortness of breath, chest pain, and even shock. Other risks include urinary tract infection, nausea and vomiting (usually related to pain medication), chronic pain, bleeding, nerve damage, blood vessel injury, scarring and infection of the hand which can require re-operation. Furthermore, the risks of anesthesia include potential heart, lung, kidney, and liver damage.  Informed consent was obtained. He understands and would like to proceed with surgery in the near future.      The patient's pathophysiology was explained in detail with reference to x-rays, models, other visual aids as appropriate.  Treatment options were discussed in detail.  Questions were invited and answered to the patient's satisfaction. I reviewed Primary care , and other specialty's notes to better coordinate patient's care.          Maryjo Chamberlain MD    Please be aware that this note has been generated with the assistance of [employer] voice-to-text.  Please excuse any spelling or grammatical errors.    This note was generated with the assistance of ambient listening technology. Verbal consent was obtained by the patient and accompanying visitor(s) for the recording of patient appointment to facilitate this note. I attest to having reviewed and edited the generated note for accuracy, though some syntax or spelling errors may persist. Please contact the author of this note for any clarification.

## 2024-06-10 NOTE — PROGRESS NOTES
[facility]  History & Physical  Orthopedics    SUBJECTIVE:      Chief Complaint:   Chief Complaint   Patient presents with    Right Hand - Pain, Swelling       Referring Provider: ANKUR Guevara MD     History of Present Illness:    Patient is a 54 y.o. right hand dominant male PMH of HLD who presents today following ED admission for 2 days due to right long finger dorsal cellulitis caused by a laceration from a fishing knife 5.25.24.  I&D was performed by an outside hospital with serosanguineous drainage and cultures were sent.  He was transferred to [facility] on 06/03/2024 and MRI showed cellulitis and mild extensor tenosynovitis.  He received IV antibiotics while inpatient.  He was discharged on oral doxycycline and amoxicillin.  Pain 2/10 today right long finger PIP joint is swollen with dorsal healing wound.     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  The patient denies any fevers, chills, N/V, D/C and presents for evaluation.    Past Medical History:   Diagnosis Date    Internal hemorrhoid      Past Surgical History:   Procedure Laterality Date    COLONOSCOPY  02/18/2020    COLONOSCOPY N/A 2/18/2020    Procedure: COLONOSCOPY/Suprep;  Surgeon: Agueda Jacques MD;  Location: [facility];  Service: Endoscopy;  Laterality: N/A;    KNEE LIGAMENT RECONSTRUCTION Right      Review of patient's allergies indicates:  No Known Allergies  Social History     Social History Narrative    Not on file     Family History   Problem Relation Name Age of Onset    Seizures Father Theo Matthew Sr     Alcohol abuse Father Theo Matthew Sr     Hypertension Mother Mom     No Known Problems Sister      Cancer Maternal Grandmother Chen Hernandez         passed 83 pancreate cancer    Diabetes Maternal Grandfather Lory Reeves     Arthritis Paternal  Grandmother Lillian Boggs     No Known Problems Paternal Grandfather           Current Outpatient Medications:     amoxicillin-clavulanate 875-125mg (AUGMENTIN) 875-125 mg per tablet, Take 1 tablet by mouth every 12 (twelve) hours. for 7 days, Disp: 14 tablet, Rfl: 0    doxycycline (VIBRAMYCIN) 100 MG Cap, Take 1 capsule (100 mg total) by mouth 2 (two) times daily. for 7 days, Disp: 14 capsule, Rfl: 0    ROS    Review of Systems:  Constitutional: no fever or chills  Eyes: no visual changes  ENT: no nasal congestion or sore throat  Respiratory: no cough or shortness of breath  Cardiovascular: no chest pain  Gastrointestinal: no nausea or vomiting, tolerating diet  Musculoskeletal: pain, soreness, decreased ROM, and laceration    OBJECTIVE:      Vital Signs (Most Recent):  There were no vitals filed for this visit.  There is no height or weight on file to calculate BMI.    Physical Exam    Physical Exam:  Constitutional: The patient appears well-developed and well-nourished. No distress.   Head: Normocephalic and atraumatic.   Nose: Nose normal.   Eyes: Conjunctivae and EOM are normal.   Neck: No tracheal deviation present.   Cardiovascular: Normal rate and intact distal pulses.    Pulmonary/Chest: Effort normal. No respiratory distress.   Abdominal: There is no guarding.   Lymphatic: Negative for adenopathy   Neurological: The patient is alert.   Psychiatric: The patient has a normal mood and affect.     Bilateral Hand/Wrist Examination:    Observation/Inspection:  Swelling  Right long dorsum finger    Deformity  none  Discoloration  none     Scars   none    Atrophy  none    HAND/WRIST EXAMINATION:  Right hand: 7 mm laceration over dorsoradial aspect of long finger, significant swelling to PIP joint, decreased AROM/PROM, tender to palpation at joint line and extensor tendon, nontender to flexor tendon palpation or movement. otherwise ROM hand/wrist/elbow full  Physical Exam         Neurovascular Exam:  Digits WWP,  brisk CR < 3s throughout  NVI motor/LTS to M/R/U nerves, radial pulse 2+  2+ biceps and brachioradialis reflexes    Diagnostic studies and other clinical records review:  Results  Laboratory Studies  Sed rate was elevated     X-rays AP, lateral and oblique right long finger today are independently reviewed by me and shows soft tissue swelling, no bony changes.    MRI hand/fingers without contrast 6.3.24 impression:   1. Small joint effusion of the 3rd proximal interphalangeal joint, suspicious for septic arthritis.  No evidence of acute osteomyelitis.  2. Cellulitis of the dorsal 3rd digit with a tiny subcentimeter abscess.  3. Small partial-thickness tear of the 3rd finger extensor tendon.      ASSESSMENT/PLAN:    54 y.o. yo male with   Encounter Diagnoses   Name Primary?    Cellulitis of right middle finger     Laceration of tendon of finger Yes    Extensor tenosynovitis of finger     Finger pain, right       Assessment & Plan  1.  Plan: The patient and I had a thorough discussion today. We discussed the working diagnosis as well as several other potential alternative diagnoses. Treatment options were discussed, both conservative and surgical. Conservative treatment options would include things such as activity modifications, antibiotics, oral vs topical OTC and prescription anti-inflammatory medications, occupational therapy, splinting/bracing, immobilization and others. Surgical options were discussed as well. New blood work was ordered.    At this time, the patient has exhausted conservative measures and would like to proceed with surgery. Surgery would include right long finger irrigation and debridement, right long finger arthrotomy, right long finger extensor tendon repair, right long finger extensor tenosynovectomy . Consent signed today in clinic. Light use of the hand will be indicated for the first 4-6 weeks     We have discussed risks, benefits and alternatives of hand surgery which include but are not  limited to blood clots in the legs that can travel to the lungs (pulmonary embolism). Pulmonary embolism can cause shortness of breath, chest pain, and even shock. Other risks include urinary tract infection, nausea and vomiting (usually related to pain medication), chronic pain, bleeding, nerve damage, blood vessel injury, scarring and infection of the hand which can require re-operation. Furthermore, the risks of anesthesia include potential heart, lung, kidney, and liver damage.  Informed consent was obtained. He understands and would like to proceed with surgery in the near future.      The patient's pathophysiology was explained in detail with reference to x-rays, models, other visual aids as appropriate.  Treatment options were discussed in detail.  Questions were invited and answered to the patient's satisfaction. I reviewed Primary care , and other specialty's notes to better coordinate patient's care.          Maryjo Chamberlain MD    Please be aware that this note has been generated with the assistance of [employer] voice-to-text.  Please excuse any spelling or grammatical errors.    This note was generated with the assistance of ambient listening technology. Verbal consent was obtained by the patient and accompanying visitor(s) for the recording of patient appointment to facilitate this note. I attest to having reviewed and edited the generated note for accuracy, though some syntax or spelling errors may persist. Please contact the author of this note for any clarification.

## 2024-06-11 ENCOUNTER — ANESTHESIA EVENT (OUTPATIENT)
Dept: SURGERY | Facility: HOSPITAL | Age: 54
End: 2024-06-11
Payer: COMMERCIAL

## 2024-06-11 ENCOUNTER — OFFICE VISIT (OUTPATIENT)
Dept: PRIMARY CARE CLINIC | Facility: CLINIC | Age: 54
End: 2024-06-11
Payer: COMMERCIAL

## 2024-06-11 ENCOUNTER — LAB VISIT (OUTPATIENT)
Dept: LAB | Facility: HOSPITAL | Age: 54
End: 2024-06-11
Attending: NURSE PRACTITIONER
Payer: COMMERCIAL

## 2024-06-11 ENCOUNTER — ANESTHESIA (OUTPATIENT)
Dept: SURGERY | Facility: HOSPITAL | Age: 54
End: 2024-06-11
Payer: COMMERCIAL

## 2024-06-11 ENCOUNTER — HOSPITAL ENCOUNTER (OUTPATIENT)
Facility: HOSPITAL | Age: 54
Discharge: HOME | End: 2024-06-11
Attending: ORTHOPAEDIC SURGERY | Admitting: ORTHOPAEDIC SURGERY
Payer: COMMERCIAL

## 2024-06-11 VITALS
HEART RATE: 77 BPM | SYSTOLIC BLOOD PRESSURE: 130 MMHG | WEIGHT: 183.19 LBS | BODY MASS INDEX: 29.44 KG/M2 | HEIGHT: 66 IN | DIASTOLIC BLOOD PRESSURE: 80 MMHG | OXYGEN SATURATION: 98 %

## 2024-06-11 VITALS
HEART RATE: 64 BPM | TEMPERATURE: 99 F | DIASTOLIC BLOOD PRESSURE: 98 MMHG | HEIGHT: 66 IN | SYSTOLIC BLOOD PRESSURE: 140 MMHG | RESPIRATION RATE: 20 BRPM | OXYGEN SATURATION: 99 % | WEIGHT: 183 LBS | BODY MASS INDEX: 29.41 KG/M2

## 2024-06-11 DIAGNOSIS — S66.929A LACERATION OF TENDON OF FINGER: ICD-10-CM

## 2024-06-11 DIAGNOSIS — L03.011 CELLULITIS OF RIGHT MIDDLE FINGER: Primary | ICD-10-CM

## 2024-06-11 DIAGNOSIS — R30.0 DYSURIA: ICD-10-CM

## 2024-06-11 DIAGNOSIS — R97.20 ELEVATED PSA: ICD-10-CM

## 2024-06-11 DIAGNOSIS — M77.9 EXTENSOR TENDON ADHESIONS: ICD-10-CM

## 2024-06-11 DIAGNOSIS — M65.849 EXTENSOR TENOSYNOVITIS OF FINGER: ICD-10-CM

## 2024-06-11 DIAGNOSIS — L03.011 CELLULITIS OF RIGHT MIDDLE FINGER: ICD-10-CM

## 2024-06-11 DIAGNOSIS — S61.239D: Primary | ICD-10-CM

## 2024-06-11 PROBLEM — M65.949 EXTENSOR TENOSYNOVITIS OF FINGER: Status: ACTIVE | Noted: 2024-06-11

## 2024-06-11 LAB
BILIRUB UR QL STRIP: NEGATIVE
CLARITY UR REFRACT.AUTO: CLEAR
COLOR UR AUTO: YELLOW
GLUCOSE UR QL STRIP: ABNORMAL
HGB UR QL STRIP: ABNORMAL
KETONES UR QL STRIP: NEGATIVE
LEUKOCYTE ESTERASE UR QL STRIP: NEGATIVE
MICROSCOPIC COMMENT: ABNORMAL
NITRITE UR QL STRIP: NEGATIVE
PH UR STRIP: 6 [PH] (ref 5–8)
PROT UR QL STRIP: ABNORMAL
RBC #/AREA URNS AUTO: 16 /HPF (ref 0–4)
SP GR UR STRIP: 1.02 (ref 1–1.03)
URN SPEC COLLECT METH UR: ABNORMAL
WBC #/AREA URNS AUTO: 2 /HPF (ref 0–5)

## 2024-06-11 PROCEDURE — 99214 OFFICE O/P EST MOD 30 MIN: CPT | Mod: S$GLB,,, | Performed by: NURSE PRACTITIONER

## 2024-06-11 PROCEDURE — 25000003 PHARM REV CODE 250: Performed by: NURSE ANESTHETIST, CERTIFIED REGISTERED

## 2024-06-11 PROCEDURE — 81001 URINALYSIS AUTO W/SCOPE: CPT | Performed by: NURSE PRACTITIONER

## 2024-06-11 PROCEDURE — 25000003 PHARM REV CODE 250: Performed by: ORTHOPAEDIC SURGERY

## 2024-06-11 PROCEDURE — 26418 REPAIR FINGER TENDON: CPT | Mod: 51,F7,, | Performed by: ORTHOPAEDIC SURGERY

## 2024-06-11 PROCEDURE — 63600175 PHARM REV CODE 636 W HCPCS: Performed by: NURSE ANESTHETIST, CERTIFIED REGISTERED

## 2024-06-11 PROCEDURE — 71000033 HC RECOVERY, INTIAL HOUR: Performed by: ORTHOPAEDIC SURGERY

## 2024-06-11 PROCEDURE — 37000008 HC ANESTHESIA 1ST 15 MINUTES: Performed by: ORTHOPAEDIC SURGERY

## 2024-06-11 PROCEDURE — 3008F BODY MASS INDEX DOCD: CPT | Mod: CPTII,S$GLB,, | Performed by: NURSE PRACTITIONER

## 2024-06-11 PROCEDURE — 71000015 HC POSTOP RECOV 1ST HR: Performed by: ORTHOPAEDIC SURGERY

## 2024-06-11 PROCEDURE — 3079F DIAST BP 80-89 MM HG: CPT | Mod: CPTII,S$GLB,, | Performed by: NURSE PRACTITIONER

## 2024-06-11 PROCEDURE — 36000706: Performed by: ORTHOPAEDIC SURGERY

## 2024-06-11 PROCEDURE — 87206 SMEAR FLUORESCENT/ACID STAI: CPT | Performed by: ORTHOPAEDIC SURGERY

## 2024-06-11 PROCEDURE — 3075F SYST BP GE 130 - 139MM HG: CPT | Mod: CPTII,S$GLB,, | Performed by: NURSE PRACTITIONER

## 2024-06-11 PROCEDURE — 3044F HG A1C LEVEL LT 7.0%: CPT | Mod: CPTII,S$GLB,, | Performed by: NURSE PRACTITIONER

## 2024-06-11 PROCEDURE — D9220A PRA ANESTHESIA: Mod: CRNA,,, | Performed by: NURSE ANESTHETIST, CERTIFIED REGISTERED

## 2024-06-11 PROCEDURE — 87086 URINE CULTURE/COLONY COUNT: CPT | Performed by: NURSE PRACTITIONER

## 2024-06-11 PROCEDURE — 87077 CULTURE AEROBIC IDENTIFY: CPT | Performed by: ORTHOPAEDIC SURGERY

## 2024-06-11 PROCEDURE — 37000009 HC ANESTHESIA EA ADD 15 MINS: Performed by: ORTHOPAEDIC SURGERY

## 2024-06-11 PROCEDURE — 94761 N-INVAS EAR/PLS OXIMETRY MLT: CPT

## 2024-06-11 PROCEDURE — 63600175 PHARM REV CODE 636 W HCPCS: Performed by: STUDENT IN AN ORGANIZED HEALTH CARE EDUCATION/TRAINING PROGRAM

## 2024-06-11 PROCEDURE — 87102 FUNGUS ISOLATION CULTURE: CPT | Performed by: ORTHOPAEDIC SURGERY

## 2024-06-11 PROCEDURE — 25116 REMOVE WRIST/FOREARM LESION: CPT | Mod: F7,,, | Performed by: ORTHOPAEDIC SURGERY

## 2024-06-11 PROCEDURE — D9220A PRA ANESTHESIA: Mod: ANES,,, | Performed by: SURGERY

## 2024-06-11 PROCEDURE — 36000707: Performed by: ORTHOPAEDIC SURGERY

## 2024-06-11 PROCEDURE — 99900035 HC TECH TIME PER 15 MIN (STAT)

## 2024-06-11 PROCEDURE — 99999 PR PBB SHADOW E&M-EST. PATIENT-LVL III: CPT | Mod: PBBFAC,,, | Performed by: NURSE PRACTITIONER

## 2024-06-11 PROCEDURE — 71000039 HC RECOVERY, EACH ADD'L HOUR: Performed by: ORTHOPAEDIC SURGERY

## 2024-06-11 PROCEDURE — 87186 SC STD MICRODIL/AGAR DIL: CPT | Performed by: ORTHOPAEDIC SURGERY

## 2024-06-11 PROCEDURE — 87205 SMEAR GRAM STAIN: CPT | Mod: 59 | Performed by: ORTHOPAEDIC SURGERY

## 2024-06-11 PROCEDURE — 87116 MYCOBACTERIA CULTURE: CPT | Performed by: ORTHOPAEDIC SURGERY

## 2024-06-11 PROCEDURE — 25000003 PHARM REV CODE 250: Performed by: PHYSICIAN ASSISTANT

## 2024-06-11 PROCEDURE — 64415 NJX AA&/STRD BRCH PLXS IMG: CPT | Mod: 59,RT | Performed by: STUDENT IN AN ORGANIZED HEALTH CARE EDUCATION/TRAINING PROGRAM

## 2024-06-11 PROCEDURE — 87075 CULTR BACTERIA EXCEPT BLOOD: CPT | Performed by: ORTHOPAEDIC SURGERY

## 2024-06-11 PROCEDURE — 87070 CULTURE OTHR SPECIMN AEROBIC: CPT | Performed by: ORTHOPAEDIC SURGERY

## 2024-06-11 RX ORDER — HALOPERIDOL 5 MG/ML
0.5 INJECTION INTRAMUSCULAR EVERY 10 MIN PRN
Status: DISCONTINUED | OUTPATIENT
Start: 2024-06-11 | End: 2024-06-11 | Stop reason: HOSPADM

## 2024-06-11 RX ORDER — DEXMEDETOMIDINE HYDROCHLORIDE 100 UG/ML
INJECTION, SOLUTION INTRAVENOUS
Status: DISCONTINUED | OUTPATIENT
Start: 2024-06-11 | End: 2024-06-11

## 2024-06-11 RX ORDER — LIDOCAINE HYDROCHLORIDE 20 MG/ML
INJECTION INTRAVENOUS
Status: DISCONTINUED | OUTPATIENT
Start: 2024-06-11 | End: 2024-06-11

## 2024-06-11 RX ORDER — DOCUSATE SODIUM 100 MG/1
100 CAPSULE, LIQUID FILLED ORAL 2 TIMES DAILY
Qty: 30 CAPSULE | Refills: 1 | Status: SHIPPED | OUTPATIENT
Start: 2024-06-11

## 2024-06-11 RX ORDER — ACETAMINOPHEN 325 MG/1
325 TABLET ORAL EVERY 6 HOURS PRN
COMMUNITY

## 2024-06-11 RX ORDER — DEXAMETHASONE SODIUM PHOSPHATE 4 MG/ML
INJECTION, SOLUTION INTRA-ARTICULAR; INTRALESIONAL; INTRAMUSCULAR; INTRAVENOUS; SOFT TISSUE
Status: DISCONTINUED | OUTPATIENT
Start: 2024-06-11 | End: 2024-06-11

## 2024-06-11 RX ORDER — MUPIROCIN 20 MG/G
OINTMENT TOPICAL 2 TIMES DAILY
Status: DISCONTINUED | OUTPATIENT
Start: 2024-06-11 | End: 2024-06-11 | Stop reason: HOSPADM

## 2024-06-11 RX ORDER — MUPIROCIN 20 MG/G
OINTMENT TOPICAL
Status: CANCELLED | OUTPATIENT
Start: 2024-06-11

## 2024-06-11 RX ORDER — BACITRACIN ZINC 500 UNIT/G
OINTMENT (GRAM) TOPICAL
Status: DISCONTINUED | OUTPATIENT
Start: 2024-06-11 | End: 2024-06-11 | Stop reason: HOSPADM

## 2024-06-11 RX ORDER — MUPIROCIN 20 MG/G
OINTMENT TOPICAL
Status: DISCONTINUED | OUTPATIENT
Start: 2024-06-11 | End: 2024-06-11 | Stop reason: HOSPADM

## 2024-06-11 RX ORDER — IBUPROFEN 400 MG/1
400 TABLET ORAL EVERY 4 HOURS
COMMUNITY

## 2024-06-11 RX ORDER — PROPOFOL 10 MG/ML
VIAL (ML) INTRAVENOUS
Status: DISCONTINUED | OUTPATIENT
Start: 2024-06-11 | End: 2024-06-11

## 2024-06-11 RX ORDER — ONDANSETRON HYDROCHLORIDE 8 MG/1
8 TABLET, FILM COATED ORAL EVERY 8 HOURS PRN
Qty: 30 TABLET | Refills: 0 | Status: SHIPPED | OUTPATIENT
Start: 2024-06-11

## 2024-06-11 RX ORDER — ROPIVACAINE HYDROCHLORIDE 5 MG/ML
INJECTION, SOLUTION EPIDURAL; INFILTRATION; PERINEURAL
Status: COMPLETED | OUTPATIENT
Start: 2024-06-11 | End: 2024-06-11

## 2024-06-11 RX ORDER — CEFAZOLIN 2 G/1
INJECTION, POWDER, FOR SOLUTION INTRAMUSCULAR; INTRAVENOUS
Status: DISCONTINUED | OUTPATIENT
Start: 2024-06-11 | End: 2024-06-11

## 2024-06-11 RX ORDER — MIDAZOLAM HYDROCHLORIDE 1 MG/ML
.5-4 INJECTION, SOLUTION INTRAMUSCULAR; INTRAVENOUS
Status: DISCONTINUED | OUTPATIENT
Start: 2024-06-11 | End: 2024-06-11 | Stop reason: HOSPADM

## 2024-06-11 RX ORDER — PROPOFOL 10 MG/ML
VIAL (ML) INTRAVENOUS CONTINUOUS PRN
Status: DISCONTINUED | OUTPATIENT
Start: 2024-06-11 | End: 2024-06-11

## 2024-06-11 RX ORDER — HYDROCODONE BITARTRATE AND ACETAMINOPHEN 5; 325 MG/1; MG/1
1 TABLET ORAL EVERY 4 HOURS PRN
Status: DISCONTINUED | OUTPATIENT
Start: 2024-06-11 | End: 2024-06-11 | Stop reason: HOSPADM

## 2024-06-11 RX ORDER — FENTANYL CITRATE 50 UG/ML
25 INJECTION, SOLUTION INTRAMUSCULAR; INTRAVENOUS EVERY 5 MIN PRN
Status: DISCONTINUED | OUTPATIENT
Start: 2024-06-11 | End: 2024-06-11 | Stop reason: HOSPADM

## 2024-06-11 RX ORDER — FAMOTIDINE 10 MG/ML
INJECTION INTRAVENOUS
Status: DISCONTINUED | OUTPATIENT
Start: 2024-06-11 | End: 2024-06-11

## 2024-06-11 RX ORDER — ACETAMINOPHEN 500 MG
1000 TABLET ORAL
Status: COMPLETED | OUTPATIENT
Start: 2024-06-11 | End: 2024-06-11

## 2024-06-11 RX ORDER — OXYCODONE HYDROCHLORIDE 5 MG/1
5 TABLET ORAL
Status: DISCONTINUED | OUTPATIENT
Start: 2024-06-11 | End: 2024-06-11 | Stop reason: HOSPADM

## 2024-06-11 RX ORDER — HYDROCODONE BITARTRATE AND ACETAMINOPHEN 5; 325 MG/1; MG/1
1 TABLET ORAL EVERY 6 HOURS PRN
Qty: 25 TABLET | Refills: 0 | Status: SHIPPED | OUTPATIENT
Start: 2024-06-11

## 2024-06-11 RX ORDER — SODIUM CHLORIDE 0.9 % (FLUSH) 0.9 %
10 SYRINGE (ML) INJECTION
Status: DISCONTINUED | OUTPATIENT
Start: 2024-06-11 | End: 2024-06-11 | Stop reason: HOSPADM

## 2024-06-11 RX ORDER — CEFAZOLIN SODIUM 2 G/50ML
2 SOLUTION INTRAVENOUS
Status: CANCELLED | OUTPATIENT
Start: 2024-06-11

## 2024-06-11 RX ORDER — FENTANYL CITRATE 50 UG/ML
25-200 INJECTION, SOLUTION INTRAMUSCULAR; INTRAVENOUS
Status: DISCONTINUED | OUTPATIENT
Start: 2024-06-11 | End: 2024-06-11 | Stop reason: HOSPADM

## 2024-06-11 RX ORDER — CELECOXIB 200 MG/1
400 CAPSULE ORAL
Status: COMPLETED | OUTPATIENT
Start: 2024-06-11 | End: 2024-06-11

## 2024-06-11 RX ADMIN — CELECOXIB 400 MG: 200 CAPSULE ORAL at 11:06

## 2024-06-11 RX ADMIN — LIDOCAINE HYDROCHLORIDE 100 MG: 20 INJECTION INTRAVENOUS at 02:06

## 2024-06-11 RX ADMIN — MUPIROCIN: 20 OINTMENT TOPICAL at 11:06

## 2024-06-11 RX ADMIN — ROPIVACAINE HYDROCHLORIDE 30 ML: 5 INJECTION EPIDURAL; INFILTRATION; PERINEURAL at 12:06

## 2024-06-11 RX ADMIN — PROPOFOL 20 MG: 10 INJECTION, EMULSION INTRAVENOUS at 02:06

## 2024-06-11 RX ADMIN — FENTANYL CITRATE 50 MCG: 50 INJECTION, SOLUTION INTRAMUSCULAR; INTRAVENOUS at 12:06

## 2024-06-11 RX ADMIN — DEXMEDETOMIDINE 12 MCG: 100 INJECTION, SOLUTION, CONCENTRATE INTRAVENOUS at 02:06

## 2024-06-11 RX ADMIN — SODIUM CHLORIDE: 0.9 INJECTION, SOLUTION INTRAVENOUS at 02:06

## 2024-06-11 RX ADMIN — DEXAMETHASONE SODIUM PHOSPHATE 8 MG: 4 INJECTION, SOLUTION INTRAMUSCULAR; INTRAVENOUS at 02:06

## 2024-06-11 RX ADMIN — PROPOFOL 150 MCG/KG/MIN: 10 INJECTION, EMULSION INTRAVENOUS at 02:06

## 2024-06-11 RX ADMIN — CEFAZOLIN 2 G: 2 INJECTION, POWDER, FOR SOLUTION INTRAMUSCULAR; INTRAVENOUS at 02:06

## 2024-06-11 RX ADMIN — MIDAZOLAM 2 MG: 1 INJECTION INTRAMUSCULAR; INTRAVENOUS at 12:06

## 2024-06-11 RX ADMIN — FAMOTIDINE 20 MG: 10 INJECTION, SOLUTION INTRAVENOUS at 02:06

## 2024-06-11 RX ADMIN — ACETAMINOPHEN 1000 MG: 500 TABLET ORAL at 11:06

## 2024-06-11 NOTE — DISCHARGE INSTRUCTIONS
HAND SURGERY - Care of the Hand after Surgery       Post-Op Care  It is important to follow your orthopaedic surgeon's instructions carefully after you return home. You should ask   someone to check on you that evening. The protocols described here are general in nature. Every person and   every surgery is different so the information given here is for guidance only. If you have questions you should   contact us.     Day of Surgery    You were place in a alumifoam splint, soft dressing with coban today to protect the surgical area during healing. Do not remove the alumifoam splint, soft dressing with coban until you are seen by Occupational Therapy or Dr. Chamberlain for your first postop visit    The hand and fingers may be numb for quite some time after surgery. This is due to the anesthetic block used at the time of surgery for pain control. If you have an arm sling you may remove the sling at your convenience once you regain control of your arm from the anesthetic block.     Begin taking liquids and food as soon as you can. You should always eat some solid food, a sandwich or light meal, a little while before taking your pain meds.     Day 3  Things are much the same on the third day after your surgery. Usually you have less pain and feel like doing more.    Showering: It is important to keep the incision absolutely dry while showering or bathing (use two plastic bags over your hand) or a shower bag.   If you feel that the pain medication you were given after surgery is stronger than you really need you can reduce the dose, take it less frequently or switch to ibuprofen or Tylenol. If you received antibiotics they should be taken until the entire prescription is completed.    Day 3-5  Occupational Therapy will see you between this time. Please let us know if you are not scheduled [phone]    Day 14  We will see you back after your surgery to review with you what was done in surgery and will talk about rehab and  answer any questions you may have.       HAND SURGERY  Driving: You can drive if you are comfortable and have regained full finger movements and if you have sufficient power to control the vehicle.   Return to work: Timing of your return to work is variable according to your occupation and specific surgery. We should discuss this at your follow up visit if not already discussed prior.  Elevation: Hand elevation is important to prevent swelling and stiffness of the fingers. One minute of leaving your hand dangling negates four hours of keeping it elevated. Please remember not to walk with your hand hanging down or to sit with your hand resting in your lap. It is fine, however, to lower your hand for light use and you should get back to normal light activities as soon as possible as guided by common sense.     Post-operative exercises  [] Bend your fingers  Relax your hand. Start with your fingers straight and close together. Bend the end and middle joints of your fingers. Keep your wrist and knuckles straight. Moving slowly and smoothly, return your hand to the starting position. Repeat with your other hand. If you can, perform multiple repetitions of this exercise on each hand.    [] Open your hand wide                       Spread your fingers apart as wide as you can and hold that position. Slowly relax your fingers and bring them together. Return to the open-wide position. Repeat with each hand and gradually add to the number of repetitions.    [] Make a fist  Start with your fingers straight and spread apart. Make a loose, gentle fist and wrap your thumb around the outside of your fingers. Be careful not to squeeze your fingers together too tightly. Moving slowly and smoothly, return to the starting position. Repeat. Perform this exercise on both hands.    [] Touch your fingertips  Straighten your fingers and thumb. Bend your thumb across your palm, touching the tip of your thumb to the pad of your hand just  "below your pinky finger. If you can't make your thumb touch, just stretch as far as you can. Return your thumb to its starting position, as shown in images 1 through 3.  For the next exercise, form the letter O by touching your thumb to each fingertip, as shown in images 4 through 6. Moving slowly and smoothly, touch your index finger to your thumb, then straighten your fingers. Touch your middle finger to your thumb and straighten. Follow with your ring and pinky fingers.    [] Walk your fingers  Rest your hand on a flat surface, such as a tabletop, with your palm facing down and your fingers spread slightly apart. Moving one finger at a time, slowly walk your fingers toward your thumb. Start by lifting and moving your index finger toward your thumb. Follow by lifting and moving your middle finger toward your thumb. Proceed with moving your ring finger and then your pinky finger toward your thumb. Don't move your wrist or thumb while doing this exercise. Repeat with your other hand.      HAND SURGERY - Common Concerns and Frequently Asked Questions    When to Call the Doctor  Pain, burning, or numbness of the fingers or the back of the hand not relieved by elevation of the arm  Pale or cold finger; bluish nail beds  Red line or streak going up the arm  Excessive swelling  Fever over 100.3ºF  Pain unrelieved by pain medication    Tips for one armed living  It helps to have...   In the shower   Plastic bags and rubber bands to cover bandages - the bags that newspapers come in are good to cover the hand and wrist. Otherwise small trash can liners will do. Use two at a time.   Bottle sponge (soft sponge on a long stick) - for the armpit of your "good" hand.   Shower brush   A hair brush in the shower will help you to wash your hair.   Cotton nicolas cloth bathrobe - to dry your back.    In the bathroom   Toothpaste, shampoo, etc. in flip-top or pump (not screw top) dispensers.   Consider an electric razor.   Flossers " "(dental floss on a "Y" shaped handle).    In the kitchen   Dycem mat (rubber jar opener mat) - to help open jars, but also keep things from sliding around while you are working on them.    Double suction cup pads ("little Octopus") - to hold items while you use or wash them.   Electric can opener with a lid magnet strong enough to hold the can in the air - for one handed use.   In the bedroom   Back scratcher.   Large sleeve shirts and tops.   Put away clothing which buttons, fastens or snaps in the back or which uses drawstrings.    Sports bra or a camisole instead of a bra.   [employer] nylons can be pulled on one handed - most others can't.   A "wash and wear" haircut.     "

## 2024-06-11 NOTE — ANESTHESIA POSTPROCEDURE EVALUATION
Anesthesia Post Evaluation    Patient: Theo Matthew     Procedure(s) Performed: Procedure(s) (LRB):  TENOSYNOVECTOMY, EXTENSOR TENDON LONG FINGER (Right)  IRRIGATION AND DEBRIDEMENT, UPPER EXTREMITY (Right)  REPAIR, TENDON, EXTENSOR LONG FINGER (Right)  TENOLYSIS, EXTENSOR, HAND OR FINGER (Right)    Final Anesthesia Type: general      Patient location during evaluation: PACU  Patient participation: Yes- Able to Participate  Level of consciousness: awake and alert and oriented  Post-procedure vital signs: reviewed and stable  Pain management: adequate  Airway patency: patent  JAVIER mitigation strategies: Multimodal analgesia  PONV status at discharge: No PONV  Anesthetic complications: no      Cardiovascular status: blood pressure returned to baseline and hemodynamically stable  Respiratory status: unassisted, spontaneous ventilation and room air  Hydration status: euvolemic  Follow-up not needed.              Vitals Value Taken Time   /98 06/11/24 1631   Temp 36.9 °C (98.5 °F) 06/11/24 1630   Pulse 62 06/11/24 1646   Resp 19 06/11/24 1646   SpO2 99 % 06/11/24 1646   Vitals shown include unfiled device data.      Event Time   Out of Recovery 16:30:00         Pain/Davin Score: Pain Rating Prior to Med Admin: 0 (6/11/2024  3:33 PM)  Pain Rating Post Med Admin: 0 (6/11/2024  3:33 PM)  Davin Score: 10 (6/11/2024  4:30 PM)

## 2024-06-11 NOTE — ANESTHESIA PREPROCEDURE EVALUATION
"                                                                                                             2024  Pre-operative evaluation for Procedure(s) (LRB):  TENOSYNOVECTOMY, EXTENSOR TENDON LONG FINGER (Right)  IRRIGATION AND DEBRIDEMENT, UPPER EXTREMITY (Right)  REPAIR, TENDON, EXTENSOR LONG FINGER (Right)    Theo Matthew Jr. is a 54 y.o. male     Past Medical History:   Diagnosis Date    Internal hemorrhoid        Review of patient's allergies indicates:  No Known Allergies    No current facility-administered medications on file prior to encounter.     Current Outpatient Medications on File Prior to Encounter   Medication Sig Dispense Refill    amoxicillin-clavulanate 875-125mg (AUGMENTIN) 875-125 mg per tablet Take 1 tablet by mouth every 12 (twelve) hours. for 7 days 14 tablet 0    doxycycline (VIBRAMYCIN) 100 MG Cap Take 1 capsule (100 mg total) by mouth 2 (two) times daily. for 7 days 14 capsule 0       Past Surgical History:   Procedure Laterality Date    COLONOSCOPY  2020    COLONOSCOPY N/A 2020    Procedure: COLONOSCOPY/Suprep;  Surgeon: Agueda Jacques MD;  Location: [facility];  Service: Endoscopy;  Laterality: N/A;    KNEE LIGAMENT RECONSTRUCTION Right        CBC:   Recent Labs     06/10/24  1540   WBC 7.01   HGB 13.9*   HCT 41.7          CMP: No results for input(s): "NA", "K", "CL", "CO2", "BUN", "CREATININE", "GLU", "MG", "PHOS", "CALCIUM", "ALBUMIN", "PROT", "ALKPHOS", "ALT", "AST", "BILITOT" in the last 72 hours.    COAGS  No results for input(s): "PT", "INR", "PROTIME", "APTT" in the last 72 hours.    BP Readings from Last 3 Encounters:   24 130/80   24 (!) 122/91   24 (!) 163/104       Diagnostic Studies:      EKD Echo:  No results found for this or any previous visit.        Pre-op Assessment    I have reviewed the Patient Summary Reports.     I have reviewed the Nursing Notes. I have reviewed the NPO Status.   I have reviewed the " Medications.     Review of Systems  Anesthesia Hx:               Denies Personal Hx of Anesthesia complications.                        Physical Exam  General: Alert, Cooperative and Oriented    Airway:  Mallampati: III / II  Mouth Opening: Normal  TM Distance: Normal  Tongue: Normal  Neck ROM: Normal ROM        Anesthesia Plan  Type of Anesthesia, risks & benefits discussed:    Anesthesia Type: Regional, Gen Natural Airway  Intra-op Monitoring Plan: Standard ASA Monitors  Post Op Pain Control Plan: multimodal analgesia  Induction:  IV  Airway Plan: Direct  Informed Consent: Informed consent signed with the Patient and all parties understand the risks and agree with anesthesia plan.  All questions answered.   ASA Score: 2  Day of Surgery Review of History & Physical: H&P Update referred to the surgeon/provider.    Ready For Surgery From Anesthesia Perspective.     .

## 2024-06-11 NOTE — TRANSFER OF CARE
"Anesthesia Transfer of Care Note    Patient: Theo Matthew Jr.    Procedure(s) Performed: Procedure(s) (LRB):  TENOSYNOVECTOMY, EXTENSOR TENDON LONG FINGER (Right)  IRRIGATION AND DEBRIDEMENT, UPPER EXTREMITY (Right)  REPAIR, TENDON, EXTENSOR LONG FINGER (Right)  TENOLYSIS, EXTENSOR, HAND OR FINGER (Right)    Patient location: PACU    Anesthesia Type: regional and MAC    Transport from OR: Transported from OR on 6-10 L/min O2 by face mask with adequate spontaneous ventilation    Post pain: adequate analgesia    Post assessment: tolerated procedure well and no apparent anesthetic complications    Post vital signs: stable    Level of consciousness: sedated    Nausea/Vomiting: no nausea/vomiting    Complications: none    Transfer of care protocol was followed      Last vitals: Visit Vitals  BP (!) 136/91   Pulse (!) 58   Temp 36.9 °C (98.5 °F) (Oral)   Resp 13   Ht 5' 6" (1.676 m)   Wt 83 kg (183 lb)   SpO2 100%   BMI 29.54 kg/m²     " Patient is calling that him and provider talk a lot at his appointment but doesn't remember anything about having an ultrasound and why ? Please call him back to advise.

## 2024-06-11 NOTE — ANESTHESIA PROCEDURE NOTES
Peripheral Block    Patient location during procedure: pre-op   Block not for primary anesthetic.  Reason for block: at surgeon's request and post-op pain management   Post-op Pain Location: right hand and wrist    End time: 6/11/2024 12:20 PM    Staffing  Authorizing Provider: Ihsan Mares MD  Performing Provider: Ihasn Mares MD    Staffing  Performed by: Ihsan Mares MD  Authorized by: Ihsan Mares MD    Preanesthetic Checklist  Completed: patient identified, IV checked, site marked, risks and benefits discussed, surgical consent, monitors and equipment checked, pre-op evaluation and timeout performed  Peripheral Block  Patient position: supine  Prep: ChloraPrep  Patient monitoring: heart rate, cardiac monitor, continuous pulse ox, continuous capnometry and frequent blood pressure checks  Block type: supraclavicular  Laterality: right  Injection technique: single shot  Needle  Needle type: Stimuplex   Needle gauge: 22 G  Needle length: 2 in  Needle localization: anatomical landmarks and ultrasound guidance   -ultrasound image captured on disc.  Assessment  Injection assessment: negative aspiration, negative parasthesia and local visualized surrounding nerve  Paresthesia pain: none  Heart rate change: no  Slow fractionated injection: yes    Medications:    Medications: ropivacaine (NAROPIN) injection 0.5% - Perineural   30 mL - 6/11/2024 12:15:00 PM    Additional Notes  VSS.  [facility] RN monitoring vitals throughout procedure.  Patient tolerated procedure well.    Ropivacaine 0.5% 2mcg epi

## 2024-06-11 NOTE — OP NOTE
[facility])  Surgery Department  Operative Note    SUMMARY     Date of Procedure: 6/11/2024     Procedure:   Right long finger extensor tendon repair zone III, cpt 59202  2.  Right long finger tenosynovectomy EDC tendons, cpt 953579  3.  Right long finger extensor tenolysis, cpt 41204  4.  Right hand irrigation and sharp excisional debridement of skin, subcutaneous tissue, tendon and bone right long finger, cpt 22442      Surgeons and Role:     * Maryjo Chamberlain MD - Primary    Assisting Surgeon: None    [facility] Assistant: Joel Elaine     Pre-Operative Diagnosis: Cellulitis of right middle finger [L03.011]  Laceration of tendon of finger [S66.929A]  Right long extensor tenosynovitis    Post-Operative Diagnosis: Post-Op Diagnosis Codes:     * Cellulitis of right middle finger [L03.011]     * Laceration of tendon of finger [S66.929A]  Right long finger traumatic arthrotomy  Right long extensor tenosynovitis  Right long extensor tendon adhesions    Anesthesia: Regional    Indication for Procedure: 55 yo male who sustained a puncture wound to the right long finger 10 days ago. He improved with I&D and antibiotics but still had residual finger swelling and elevated labs. Risks and benefits of the procedure were discussed with the patient and informed consent was obtained.    Description of the Findings of the Procedure: The patient was seen in the preoperative holding area and the right long finger was marked.  Regional anesthesia was achieved in the preoperative holding area.  The patient was taken to the OR, placed supine on the table, and a padded hand table was used. After monitored anesthesia care was administered without difficulty, a time-out procedure was performed identifying the patient, the operative site and the procedure to be performed. A tourniquet was placed on the arm and the upper extremity was prepped and draped in standard sterile fashion. The upper extremity was exsanguinated, and the  tourniquet was inflated to 250 mm Hg.     A 3 cm longitudinal incision was made over the PIP joint of the right long finger.  Littler scissors were used to dissect down to the extensor tendon.  There was a laceration in the extensor cruz which included the central slip in the lateral band.  There was copious extensor tenosynovitis which was removed with a rongeur.  There was a traumatic arthrotomy and some erosion in the dorsal proximal phalanx head.  The cartilage was intact.  Sharp excisional debridement was performed of any nonviable skin, subcutaneous tissue, tendon and bone.  2 L of normal saline were used to irrigate the wound and PIP joint using cysto tubing.  After the wound was clean there was adhesions found of the extensor tendon to the skin as well as the underlying joint capsule, a [place] elevator as well as scissors were used to perform extensor tenolysis to mobilize the extensor tendon for repair.  4-0 FiberWire was used to repair the central slip in the lateral band in a modified Cruz manner.  6-0 Prolene was used for an epitendinous repair.  There was good tendon apposition and no gapping at the repair sites.     The neurovascular bundles were identified and protected throughout the case. Bipolar electrocautery was used for hemostasis.  3-0 prolene was used to close the skin in a simple fashion.  Adaptic, 4x4, thumb splint, tube gauze cast padding and coban were used to dress the hand. All instrument, sponge and needle counts were correct. The tourniquet was deflated and the hand was pink and well-perfused.  The patient tolerated the procedure well.  He was taken awake and alert to the recovery room.      Complications: No    Estimated Blood Loss (EBL): minimal           Implants: * No implants in log *    Specimens:   Specimen (24h ago, onward)      None                    Condition: Good    Disposition: PACU - hemodynamically stable.    Attestation: I was present and scrubbed for the entire  procedure.

## 2024-06-11 NOTE — DISCHARGE SUMMARY
[facility] - Surgery (Hospital)  Discharge Note  Short Stay    Procedure(s) (LRB):  TENOSYNOVECTOMY, EXTENSOR TENDON LONG FINGER (Right)  IRRIGATION AND DEBRIDEMENT, UPPER EXTREMITY (Right)  REPAIR, TENDON, EXTENSOR LONG FINGER (Right)  TENOLYSIS, EXTENSOR, HAND OR FINGER (Right)      OUTCOME: Patient tolerated treatment/procedure well without complication and is now ready for discharge.    DISPOSITION: Home or Self Care    FINAL DIAGNOSIS:  right long finger traumatic arthrotomy, extensor tendon laceration, extensor tendon adhesions, tenosynovitis, osteomyelitis    FOLLOWUP: In clinic    DISCHARGE INSTRUCTIONS:    Discharge Procedure Orders   Diet general     Keep surgical extremity elevated     Ice to affected area     Lifting restrictions     No driving, operating heavy equipment or signing legal documents while taking pain medication.     Leave dressing on - Keep it clean, dry, and intact until clinic visit     Call MD for:  temperature >100.4     Call MD for:  persistent nausea and vomiting     Call MD for:  severe uncontrolled pain     Call MD for:  difficulty breathing, headache or visual disturbances     Call MD for:  redness, tenderness, or signs of infection (pain, swelling, redness, odor or green/yellow discharge around incision site)     Call MD for:  hives     Call MD for:  persistent dizziness or light-headedness     Call MD for:  extreme fatigue

## 2024-06-12 ENCOUNTER — TELEPHONE (OUTPATIENT)
Dept: ORTHOPEDICS | Facility: CLINIC | Age: 54
End: 2024-06-12
Payer: COMMERCIAL

## 2024-06-12 DIAGNOSIS — Z96.652 HISTORY OF KNEE REPLACEMENT, TOTAL, LEFT: ICD-10-CM

## 2024-06-12 DIAGNOSIS — Z96.652 PRESENCE OF LEFT ARTIFICIAL KNEE JOINT: Primary | ICD-10-CM

## 2024-06-12 LAB
BACTERIA UR CULT: NO GROWTH
FUNGUS SPEC CULT: NORMAL
FUNGUS SPEC CULT: NORMAL
GRAM STN SPEC: NORMAL

## 2024-06-13 LAB
ACID FAST MOD KINY STN SPEC: NORMAL
ACID FAST MOD KINY STN SPEC: NORMAL
MYCOBACTERIUM SPEC QL CULT: NORMAL
MYCOBACTERIUM SPEC QL CULT: NORMAL

## 2024-06-14 ENCOUNTER — CLINICAL SUPPORT (OUTPATIENT)
Dept: REHABILITATION | Facility: HOSPITAL | Age: 54
End: 2024-06-14
Attending: ORTHOPAEDIC SURGERY
Payer: COMMERCIAL

## 2024-06-14 ENCOUNTER — OFFICE VISIT (OUTPATIENT)
Dept: INFECTIOUS DISEASES | Facility: CLINIC | Age: 54
End: 2024-06-14
Payer: COMMERCIAL

## 2024-06-14 VITALS
DIASTOLIC BLOOD PRESSURE: 91 MMHG | TEMPERATURE: 98 F | SYSTOLIC BLOOD PRESSURE: 131 MMHG | BODY MASS INDEX: 29.09 KG/M2 | WEIGHT: 181 LBS | HEIGHT: 66 IN | HEART RATE: 58 BPM

## 2024-06-14 DIAGNOSIS — L03.011 CELLULITIS OF RIGHT MIDDLE FINGER: Primary | ICD-10-CM

## 2024-06-14 DIAGNOSIS — M86.141 OTHER ACUTE OSTEOMYELITIS OF RIGHT HAND: ICD-10-CM

## 2024-06-14 DIAGNOSIS — M25.641 STIFFNESS OF FINGER JOINT OF RIGHT HAND: ICD-10-CM

## 2024-06-14 DIAGNOSIS — S66.929A LACERATION OF TENDON OF FINGER: ICD-10-CM

## 2024-06-14 DIAGNOSIS — M79.644 PAIN OF RIGHT MIDDLE FINGER: Primary | ICD-10-CM

## 2024-06-14 DIAGNOSIS — M00.80 PSEUDOMONAS ARTHRITIS: ICD-10-CM

## 2024-06-14 DIAGNOSIS — B96.5 PSEUDOMONAS ARTHRITIS: ICD-10-CM

## 2024-06-14 LAB — BACTERIA SPEC AEROBE CULT: ABNORMAL

## 2024-06-14 PROCEDURE — 1159F MED LIST DOCD IN RCRD: CPT | Mod: CPTII,S$GLB,, | Performed by: INTERNAL MEDICINE

## 2024-06-14 PROCEDURE — 1160F RVW MEDS BY RX/DR IN RCRD: CPT | Mod: CPTII,S$GLB,, | Performed by: INTERNAL MEDICINE

## 2024-06-14 PROCEDURE — 3044F HG A1C LEVEL LT 7.0%: CPT | Mod: CPTII,S$GLB,, | Performed by: INTERNAL MEDICINE

## 2024-06-14 PROCEDURE — 1111F DSCHRG MED/CURRENT MED MERGE: CPT | Mod: CPTII,S$GLB,, | Performed by: INTERNAL MEDICINE

## 2024-06-14 PROCEDURE — 3075F SYST BP GE 130 - 139MM HG: CPT | Mod: CPTII,S$GLB,, | Performed by: INTERNAL MEDICINE

## 2024-06-14 PROCEDURE — 3008F BODY MASS INDEX DOCD: CPT | Mod: CPTII,S$GLB,, | Performed by: INTERNAL MEDICINE

## 2024-06-14 PROCEDURE — 99205 OFFICE O/P NEW HI 60 MIN: CPT | Mod: S$GLB,,, | Performed by: INTERNAL MEDICINE

## 2024-06-14 PROCEDURE — 3080F DIAST BP >= 90 MM HG: CPT | Mod: CPTII,S$GLB,, | Performed by: INTERNAL MEDICINE

## 2024-06-14 PROCEDURE — 99999 PR PBB SHADOW E&M-EST. PATIENT-LVL III: CPT | Mod: PBBFAC,,, | Performed by: INTERNAL MEDICINE

## 2024-06-14 PROCEDURE — L3933 FO W/O JOINTS CF: HCPCS

## 2024-06-14 RX ORDER — CIPROFLOXACIN 750 MG/1
750 TABLET, FILM COATED ORAL EVERY 12 HOURS
Qty: 60 TABLET | Refills: 1 | Status: SHIPPED | OUTPATIENT
Start: 2024-06-14

## 2024-06-14 NOTE — PATIENT INSTRUCTIONS
[facility] Occupational Therapy  Orthosis Instructions and Proof of Delivery        Date: 6/14/2024  Name: Theo Matthew Jr.  MRN: [id number]  YOB: 1970  Referring Provider: Maryjo Chamberlain MD  Diagnosis:  Cellulitis of right middle finger S66.929A (ICD-10-CM) - Laceration of tendon of finger M65.849 (ICD-10-CM) - Extensor tenosynovitis of finger  Date of Procedure: 6/11/2024      Procedure:   Right long finger extensor tendon repair zone III, cpt 43758  2.  Right long finger tenosynovectomy EDC tendons, cpt 794375  3.  Right long finger extensor tenolysis, cpt 99413  4.  Right hand irrigation and sharp excisional debridement of skin, subcutaneous tissue, tendon and bone right long finger, cpt 41769    [facility] Level II Code and Description   Custom, Static, Finger Gutter Orthosis    Orthosis Information  (X) Custom Fabricated                (x) Right                                    l  (x) Static                                         Orthosis Instructions  () Wear the orthosis at all times  () Remove for dressing changes only     Cleaning and Maintenance  Keep your orthosis away from any heat sources. Do not leave in your car.  Keep your orthosis away from pets.  You may clean your orthosis with cool water and soap or a mild cleanser.  Your orthosis may need adjustments due to changes in your medical condition (swelling, dressing size, additional surgery, etc.)  Monitor your skin color and integrity.  Do not try to adjust the orthosis on your own.     If you have any questions or concerns, please contact the therapy office at [phone].     I, Theo D Vipul Felton , have personally received the above described orthosis along with instructions on the wear, care, and precautions related to this orthosis. I understand that I am responsible for payment to [facility] of my deductible, coinsurance, or other portion of my charges not paid in full by my insurance plans, including any  item that is not covered under the insurance plan.     Signature: _________________________________ Date: __________________    Therapist:

## 2024-06-14 NOTE — PROGRESS NOTES
Occupational Therapy Orthotic Note    Patient: Theo Matthew Jr.  MRN: [id number]  Date of visit: 6/14/2024  Referring Physician:  Maryjo Chamberlain MD   Next MD visit: 6/24/2024  Primary Diagnosis: L03.011 (ICD-10-CM) - Cellulitis of right middle finger S66.929A (ICD-10-CM) - Laceration of tendon of finger M65.849 (ICD-10-CM) - Extensor tenosynovitis of finger  Treatment Diagnosis:   Encounter Diagnoses   Name Primary?    Pain of right middle finger Yes    Stiffness of finger joint of right hand      DOSDate of Procedure: 6/11/2024      Procedure:   Right long finger extensor tendon repair zone III, cpt 28235  2.  Right long finger tenosynovectomy EDC tendons, cpt 607828  3.  Right long finger extensor tenolysis, cpt 28933  4.  Right hand irrigation and sharp excisional debridement of skin, subcutaneous tissue, tendon and bone right long finger, cpt 40340      Untimed Procedure: L 3933, Description: Custom, static, finger gutter orthosis    Subjective:   Pt was without complaints.  Objective:     Patient presented in bulky post-op dressing and plaster splint. This was removed. Surgery site noted to be healthy in appearance with no signs of infection.Sutures intact. Redressed with  non-stick gauze, roll gauze, and self-adherent elastic latex-freewrap. .    Patient seen by OT this session for fabrication of orthosis per MD orders. Fabricated and applied custom, static, finger gutter orthosis to immobilize repaired extensor tendon in extension.     Assessment:     Orthosis with noted to fit appropriately following fabrication. Pt was able to deonte/doff independently. Pt displayed good understanding of all instructions including wear/care/precautions of the orthosis.     Patient Education/Response:   Pt was instructed to wear the orthosis at all times. Patient was provided written instructions on orthosis purpose, wear schedule, care and precautions to monitor for increased pain/edema, pressure points, skin breakdown or  redness/skin irritation Patient is to contact clinic for adjustments as needed.  Patient signed copy of orthosis instructions, acknowledging delivery and understanding of wear, care, and precautions. Pt was instructed to keep sx site dry at all times and was issued home dressing supplies in case of accidentally wetting the dressing.     (see Media for scanned documents)    Plans and Goals:     Goal of Orthosis to protect repair /healing structures of right long finger.  Pt to be seen for initial evaluation next Tuesday,

## 2024-06-14 NOTE — PROGRESS NOTES
SUBJECTIVE:      Chief Complaint:   Chief Complaint   Patient presents with    Right Hand - Pain, Swelling       Referring Provider: Maryjo Chamberlain MD     History of Present Illness:    Patient is a 54 y.o. right hand dominant male PMH of HLD who presents today due to right long finger dorsal cellulitis caused by a laceration from a fishing knife 5.25.24. Had scaled fish and was sharpening to filet. Cut finger. Cleaned and applied neosporin.  3 days laterworsened with swelling of finger and up hand to fist. Went to  and antibiotics and steroids given. Swelling improved but went for follow up and admitted.  I&D was performed by an outside hospital with serosanguineous drainage and cultures were sent.  He was transferred to [facility] on 06/03/2024 and MRI showed cellulitis and mild extensor tenosynovitis.  He received IV antibiotics while inpatient.  He was discharged on oral doxycycline and amoxicillin.      He is retired but very active with fishing, working in yard, around the house.    Tetanus vaccine in 2016.    Underwent on 6/11/24:  Post-Operative Diagnosis: Post-Op Diagnosis Codes:     * Cellulitis of right middle finger [L03.011]     * Laceration of tendon of finger [S66.929A]  Right long finger traumatic arthrotomy  Right long extensor tenosynovitis  Right long extensor tendon adhesions    Cultures from surgery tendon/joint now positive for pseudomonas. S pending.           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  The patient denies any fevers, chills, N/V, D/C and presents for evaluation.    Past Medical History:   Diagnosis Date    Internal hemorrhoid      Past Surgical History:   Procedure Laterality Date    COLONOSCOPY  02/18/2020    COLONOSCOPY N/A 2/18/2020    Procedure: COLONOSCOPY/Suprep;  Surgeon: Agueda Jacques MD;  Location: [facility];   Service: Endoscopy;  Laterality: N/A;    KNEE LIGAMENT RECONSTRUCTION Right      Review of patient's allergies indicates:  No Known Allergies  Social History     Social History Narrative    Not on file     Family History   Problem Relation Name Age of Onset    Seizures Father Theo Matthew Sr     Alcohol abuse Father Theo Matthew Sr     Hypertension Mother Mom     No Known Problems Sister      Cancer Maternal Grandmother Chen Hernandez         passed 83 pancreate cancer    Diabetes Maternal Grandfather Lory Reeves     Arthritis Paternal Grandmother Lillian Boggs     No Known Problems Paternal Grandfather           Current Outpatient Medications:     amoxicillin-clavulanate 875-125mg (AUGMENTIN) 875-125 mg per tablet, Take 1 tablet by mouth every 12 (twelve) hours. for 7 days, Disp: 14 tablet, Rfl: 0    doxycycline (VIBRAMYCIN) 100 MG Cap, Take 1 capsule (100 mg total) by mouth 2 (two) times daily. for 7 days, Disp: 14 capsule, Rfl: 0    ROS    Review of Systems:  Constitutional: no fever or chills  Eyes: no visual changes  ENT: no nasal congestion or sore throat  Respiratory: no cough or shortness of breath  Cardiovascular: no chest pain  Gastrointestinal: no nausea or vomiting, tolerating diet  Musculoskeletal: pain, soreness, decreased ROM, and laceration    OBJECTIVE:      Vital Signs (Most Recent):  There were no vitals filed for this visit.  There is no height or weight on file to calculate BMI.    Physical Exam    Physical Exam:  Constitutional: The patient appears well-developed and well-nourished. No distress.   Head: Normocephalic and atraumatic.   Nose: Nose normal.   Eyes: Conjunctivae and EOM are normal.   Neck: No tracheal deviation present.   Cardiovascular: Normal rate and intact distal pulses.    Pulmonary/Chest: Effort normal. No respiratory distress.   Abdominal: There is no guarding.   Lymphatic: Negative for adenopathy   Neurological: The patient is alert.   Psychiatric: The patient has a normal  mood and affect.     Bilateral Hand/Wrist Examination:    Observation/Inspection:  Swelling  Right long dorsum finger    Deformity  none  Discoloration  none     Scars   none    Atrophy  none    HAND/WRIST EXAMINATION:  Right hand: middle finger wrapped and in splint.    Diagnostic studies and other clinical records review:  Results  Laboratory Studies  Sed rate was elevated         MRI hand/fingers without contrast 6.3.24 impression:   1. Small joint effusion of the 3rd proximal interphalangeal joint, suspicious for septic arthritis.  No evidence of acute osteomyelitis.  2. Cellulitis of the dorsal 3rd digit with a tiny subcentimeter abscess.  3. Small partial-thickness tear of the 3rd finger extensor tendon.    Diagnoses and all orders for this visit:    Cellulitis of right middle finger  -     CBC Auto Differential; Standing  -     Comprehensive Metabolic Panel; Standing  -     C-Reactive Protein; Future  -     Sedimentation rate; Standing    Laceration of tendon of finger    Other acute osteomyelitis of right hand    Pseudomonas arthritis    Other orders  -     ciprofloxacin HCl (CIPRO) 750 MG tablet; Take 1 tablet (750 mg total) by mouth every 12 (twelve) hours.     Reviewed all labs and culture results.  Concern for extensive infection and will treat as osteomyelitis per Dr. Chamberlain.  Await sensitivities.  Cipro 750mg po bid for now.  Counseled about side effects.  Also potential to need IV abx if not susceptible.  Plan 6 weeks of therapy depending on clinical response.  Counseled regarding preventing injuries, cleaning wounds.    Addendum:  Pseudomonas is S to cipro. Continue that and follow up labs and visit 2-3 weeks.

## 2024-06-15 LAB — BACTERIA SPEC AEROBE CULT: NO GROWTH

## 2024-06-17 ENCOUNTER — TELEPHONE (OUTPATIENT)
Dept: INFECTIOUS DISEASES | Facility: CLINIC | Age: 54
End: 2024-06-17
Payer: COMMERCIAL

## 2024-06-18 ENCOUNTER — CLINICAL SUPPORT (OUTPATIENT)
Dept: REHABILITATION | Facility: HOSPITAL | Age: 54
End: 2024-06-18
Attending: ORTHOPAEDIC SURGERY
Payer: COMMERCIAL

## 2024-06-18 DIAGNOSIS — M79.644 PAIN OF RIGHT MIDDLE FINGER: Primary | ICD-10-CM

## 2024-06-18 DIAGNOSIS — M25.641 STIFFNESS OF FINGER JOINT OF RIGHT HAND: ICD-10-CM

## 2024-06-19 LAB
BACTERIA SPEC ANAEROBE CULT: NORMAL
BACTERIA SPEC ANAEROBE CULT: NORMAL

## 2024-06-20 ENCOUNTER — TELEPHONE (OUTPATIENT)
Dept: ORTHOPEDICS | Facility: CLINIC | Age: 54
End: 2024-06-20
Payer: COMMERCIAL

## 2024-06-20 ENCOUNTER — CLINICAL SUPPORT (OUTPATIENT)
Dept: REHABILITATION | Facility: HOSPITAL | Age: 54
End: 2024-06-20
Payer: COMMERCIAL

## 2024-06-20 DIAGNOSIS — M79.644 PAIN OF RIGHT MIDDLE FINGER: Primary | ICD-10-CM

## 2024-06-20 DIAGNOSIS — M25.641 STIFFNESS OF FINGER JOINT OF RIGHT HAND: ICD-10-CM

## 2024-06-20 PROCEDURE — 97763 ORTHC/PROSTC MGMT SBSQ ENC: CPT

## 2024-06-20 PROCEDURE — 97110 THERAPEUTIC EXERCISES: CPT

## 2024-06-20 NOTE — TELEPHONE ENCOUNTER
Hello, this is a message to notify you regarding your xray appointment at [place] [facility] 30 minutes prior to your appointment time on 6/24/24 with Dr. Chamberlain for x-rays.     Please arrive a little early to check in prior to your appointment approximately at 10:00 AM.     Xray does run behind sometimes, we do appreciate your patience in advance. If you would like to get your xray before your appointment please reschedule your xray at a location near you at your convenience through the [facility].    *Please arrive at your informed time above, if you are more than 15 Minutes late to your appointment with Dr. Chamberlain we will have to reschedule your appointment. This will allow you to be seen in a timely manor and be conscious to other patients being seen that same day*     Please respond to this message to confirm you received this notification.

## 2024-06-20 NOTE — PLAN OF CARE
[facility]  Occupational Therapy Initial Evaluation     Name: Theo Matthew Jr.  Clinic Number: [id number]    Therapy Diagnosis: No diagnosis found.  Physician: Maryjo Chamberlain MD    Physician Orders: Zones III & IV(Central Slip/Boutonniere)   Initiate treatment 3-5 days post -op. Pt to attend therapy 3x/wk.  Fabricate 2-3 splints for immobilization and exercises per [facility] Protocol.  1 hand-based immobilization splint and additional exercise splints per structures repaired are to be fabricated (see below): If zone III, all patients will receive the Immobilization Orthosis 1: Hand based with MCP in 30° flexion volar with PIP & DIP 0- to be worn 100% except for exercise. (). Sutures removed at 10 days post op. 13d for DMII. Exercise to per formed hourly such as instructions below:    Medical Diagnosis: L03.011 (ICD-10-CM) - Cellulitis of right middle finger S66.929A (ICD-10-CM) - Laceration of tendon of finger M65.849 (ICD-10-CM) - Extensor tenosynovitis of finger    Date of Procedure: 6/11/2024      Procedure:   Right long finger extensor tendon repair zone III, cpt 75108  2.  Right long finger tenosynovectomy EDC tendons, cpt 240419  3.  Right long finger extensor tenolysis, cpt 94680  4.  Right hand irrigation and sharp excisional debridement of skin, subcutaneous tissue, tendon and bone right long finger, cpt 98901     Evaluation Date: 6/18/2024  Insurance Authorization Period Expiration: 12/31/2024  Plan of Care Certification Period: 9/10/24  Date of Return to MD:  6/24/2024   Visit # / Visits authorized: 1 / 10  FOTO Function Score  53% on 6/14/2024    Precautions:  Standard and blood and bodily fluid and Tendon repair precautions    Time In:11:00 am  Time Out: 12:05  Total Appointment Time (timed & untimed codes): 60 minutes    Subjective     Date of Onset: 5/25/2024    History of Current Condition/Mechanism of Injury: Theo reports: while cleaning a knife to use to  cut fish. Began noticing increase in swelling and redness.  The swelling increased into the hand on 5/28/2024 and he decided to go to Urgent care and was givien antibiotics. On 6/3 he noticed worning of symptoms and returned to Urgent Care but was sent to ER. IV antibiotic were continued. 3, 4, and 5 th. Decision was made to have sx.    Involved Side: Right  Dominant Side: Right      Prior Therapy: None    Pain: 0/10    Past Medical History/Physical Systems Review:   Theo Matthew Jr.  has a past medical history of Internal hemorrhoid.    Theo Matthew Jr.  has a past surgical history that includes Knee ligament reconstruction (Right); Colonoscopy (02/18/2020); Colonoscopy (N/A, 2/18/2020); Tenosynovectomy (Right, 6/11/2024); Irrigation and debridement of upper extremity (Right, 6/11/2024); Repair of extensor tendon (Right, 6/11/2024); and tenolysis, extensor, hand or finger (Right, 6/11/2024).    Theo has a current medication list which includes the following prescription(s): acetaminophen, ciprofloxacin hcl, docusate sodium, hydrocodone-acetaminophen, ibuprofen, and ondansetron.    Review of patient's allergies indicates:  No Known Allergies     Objective     Observation/Appearance:  Pt presented in custom finger gutter orthosis with dressing in place. Sx site noted to be healthy in appearance with sutures in place. No drainage. Moderate edema long finger, marquez at PIP    Edema. Measured in centimeters.   6/18/2024     Right    Long:       P1 7.6     PIP suture    P2 7.2     DIP     P3     Hand ROM. Measured in degrees.   6/18/2024     Right     Limited by post-op restrictions         Long:  MP                PIP                DIP                CHAN          6/18/2024 COMMENTS/OBSERVATION: Mild flexion contracture present at PIP of the long finger (@ 25 degrees) . This is not fully correctable. Active IP extension noted to be minimal .    Treatment     Theo received the treatments listed below:    Orthosis  modification       Patient Education and Home Exercises      Education provided:   -role of OT, goals for OT, scheduling/cancellations, insurance limitations with patient.  -Additional Education provided: Status    Written Home Exercises Provided: NTJose D Venegas demonstrated good  understanding of the education provided.     Pt was advised to perform these exercises free of pain, and to stop performing them if pain occurs.    See EMR under NT for exercises provided .    Assessment     Theo Matthew Jr. is a 54 y.o. male referred to outpatient occupational therapy and presents with a medical diagnosis of L03.011 (ICD-10-CM) - Cellulitis of right middle finger S66.929A (ICD-10-CM) - Laceration of tendon of finger M65.849 (ICD-10-CM) - Extensor tenosynovitis of finger.    Following medical record review it is determined that pt will benefit from occupational therapy services in order to maximize pain free and/or functional use of right long finger. The following goals were discussed with the patient and patient is in agreement with them as to be addressed in the treatment plan. The patient's rehab potential is Good.     Anticipated barriers to occupational therapy: Nature of the injury.    Plan of care discussed with patient: Yes  Patient's spiritual, cultural and educational needs considered and patient is agreeable to the plan of care and goals as stated below:     Medical Necessity is demonstrated by the following  Occupational Profile/History  Co-morbidities and personal factors that may impact the plan of care [x] LOW: Brief chart review  [] MODERATE: Expanded chart review   [] HIGH: Extensive chart review    Moderate / High Support Documentation: NA     Examination  Performance deficits relating to physical, cognitive or psychosocial skills that result in activity limitations and/or participation restrictions  [x] LOW: addressing 1-3 Performance deficits  [] MODERATE: 3-5 Performance deficits  [] HIGH: 5+  Performance deficits (please support below)    Moderate / High Support Documentation:    Physical:  Joint Mobility  Skin Integrity/Scar Formation  Edema   Strength  Pinch Strength  Gross Motor Coordination  Fine Motor Coordination  Visual Functions  Proprioception Functions  Tactile Functions  Pain    Cognitive:  No Deficits    Psychosocial:    No Deficits     Treatment Options [] LOW: Limited options  [] MODERATE: Several options  [x] HIGH: Multiple options      Decision Making/ Complexity Score: low       The following goals were discussed with the patient and patient is in agreement with them as to be addressed in the treatment plan.       Goals:   The following goals were discussed with the patient and patient is in agreement with them as to be addressed in the treatment plan.     Long Term Goals (LTGs); to be met by discharge.  LTG #1: Pt will report a pain level of 2 out of 10 with ADLs/IADLs   LTG #2: Pt will demo improved FOTO score by 20 points.   LTG #3: Pt will return to prior level of function for ADLs /IADLs    Short Term Goals (STGs); to be met within 6 weeks (7/30/2024)  STG #1: Pt will report a pain level of 2 out of 10 with use in light ADLs.  STG #2: Pt will demo improved FOTO score by 10 points.   STG #3: Pt will reports independence in light ADL's with use of the involved Hand/wrist/UE  STG #4: Pt will demonstrate independence with issued HEP.   STG #5: Pt will be able to touch tip to palm  STG #6: Pt will be able to extend the PIP to (-10 degrees.    Plan   Certification Period/Plan of care expiration: 6/18/2024 to 9/10/2024.    Outpatient Occupational Therapy 3 times weekly for 12 weeks to include the following interventions: Paraffin, Fluidotherapy, Manual therapy/joint mobilizations, Modalities for pain management, US 3 mhz, Therapeutic exercises/activities., Strengthening, Orthotic Fabrication/Fit/Training, Edema Control, and Scar Management.    Chrissy Snowden, OT

## 2024-06-20 NOTE — PROGRESS NOTES
[facility]  Occupational Therapy Treatment Note    Date: 6/20/2024  Name: Theo Matthew Jose D  Clinic Number: [id number]    Therapy Diagnosis:   Encounter Diagnoses   Name Primary?    Pain of right middle finger Yes    Stiffness of finger joint of right hand      Physician: Maryjo Chamberlain MD    Physician Orders: Zones III & IV(Central Slip/Boutonniere)   Initiate treatment 3-5 days post -op. Pt to attend therapy 3x/wk.  Fabricate 2-3 splints for immobilization and exercises per [facility] [employer] Protocol.  1 hand-based immobilization splint and additional exercise splints per structures repaired are to be fabricated (see below): If zone III, all patients will receive the Immobilization Orthosis 1: Hand based with MCP in 30° flexion volar with PIP & DIP 0- to be worn 100% except for exercise. (). Sutures removed at 10 days post op. 13d for DMII. Exercise to per formed hourly such as instructions below:     Medical Diagnosis: L03.011 (ICD-10-CM) - Cellulitis of right middle finger S66.929A (ICD-10-CM) - Laceration of tendon of finger M65.849 (ICD-10-CM) - Extensor tenosynovitis of finger     Date of Procedure: 6/11/2024      Procedure:   Right long finger extensor tendon repair zone III, cpt 19955  2.  Right long finger tenosynovectomy EDC tendons, cpt 553371  3.  Right long finger extensor tenolysis, cpt 49639  4.  Right hand irrigation and sharp excisional debridement of skin, subcutaneous tissue, tendon and bone right long finger, cpt 98522     Evaluation Date: 6/18/2024  Insurance Authorization Period Expiration: 12/31/2024  Plan of Care Certification Period: 9/10/24  Date of Return to MD:  6/24/2024   Visit # / Visits authorized: 1 / 10  FOTO Function Score  53% on 6/14/2024     Precautions:  Standard and blood and bodily fluid and Tendon repair precautions  Time In: 10:45 am (Pt 15 min late)  Time Out: 11: 30  Total Billable Steve: 45 minutes      SUBJECTIVE     Pt reports: No  pain     Response to previous treatment:NA  Functional change: NA    Pain: 0/10  Location: right long finger      OBJECTIVE   Objective Measures updated at progress report unless specified.    Observation/Appearance:  Pt presented in custom finger gutter orthosise. Sx site noted to be healthy in appearance with sutures in place. No drainage. Moderate edema long finger, marquez at PIPMild PIP flexion contracture  persists     Edema. Measured in centimeters.    6/18/2024       Right     Long:         P1 7.6      PIP suture     P2 7.2      DIP       P3       Hand ROM. Measured in degrees.    6/18/2024       Right       Limited by post-op restrictions             Long:  MP                  PIP                  DIP                  CHAN               6/18/2024 COMMENTS/OBSERVATION: Mild flexion contracture present at PIP of the long finger (@ 25 degrees) . This is not fully correctable. Active IP extension noted to be minimal .   6/202/2024: Improved to @ 15 degrees today  Treatment      Theo received the treatments listed below  MHP x 10 min with finger in extension     Therapeutic Usrkemig02 min  PROM PIP extension   Short Arc PIP  extension / flexion 20  reps    OrthosisCheckout: 20 minutes  After prom of PIP for extension orthosis was reheated and remolded to promote increase PIP extension  passively        Patient Education and Home Exercises      Education provided:     - Progress towards goals     Written Home Exercises Provided: NT    ASSESSMENT      PIP flexion contracture is improving allowing PIP to be rested in  more extension in orthosis    Theo is progressing  towards his goals and there are no updates to goals at this time. Pt prognosis is Excellent.     Pt will continue to benefit from skilled outpatient occupational therapy to address the deficits listed in the problem list on initial evaluation, provide pt/family education and to maximize pt's level of independence in the home and community environment.      Pt's spiritual, cultural and educational needs considered and pt agreeable to plan of care and goals.    Anticipated barriers to occupational therapy: Duration and nature of condiction    Goals:  Long Term Goals (LTGs); to be met by discharge.  LTG #1: Pt will report a pain level of 2 out of 10 with ADLs/IADLs          LTG #2: Pt will demo improved FOTO score by 20 points.   LTG #3: Pt will return to prior level of function for ADLs /IADLs     Short Term Goals (STGs); to be met within 6 weeks (7/30/2024)  STG #1: Pt will report a pain level of 2 out of 10 with use in light ADLs.  STG #2: Pt will demo improved FOTO score by 10 points.   STG #3: Pt will reports independence in light ADL's with use of the involved Hand/wrist/UE  STG #4: Pt will demonstrate independence with issued HEP.   STG #5: Pt will be able to touch tip to palm  STG #6: Pt will be able to extend the PIP to (-10 degrees.       PLAN     Continue skilled occupational therapy with individualized plan of care focusing on PIP flexion contracture and short arc arom.    Updates/Grading for next session: Progress along protocol as appropriate    Chrissy Snowden, OT

## 2024-06-21 NOTE — PROGRESS NOTES
Theo Matthew Jr. presents for post-operative evaluation of No diagnosis found..   History of Present Illness     The patient is now 12d s/p 6.11.24 Right long finger I&D, extensor tendon + lateral band + central slip repair, extensor tenosynovectomy; intra-op cx + pseudomonas aeruginosa.  Patient has been compliant with finger gutter orthosis. Overall the patient reports doing well.  The patient reports appropriate postoperative soreness with well controlled overall pain.      ID: 6.14.24 - oral Cipro for 6 weeks.     Vitals:    06/24/24 1107   PainSc:   1   PainLoc: Finger       PE:    AA&O x 4.  NAD  HEENT:  NCAT, sclera nonicteric  Lungs:  Respirations are equal and unlabored.  CV:  2+ bilateral upper and lower extremity pulses.  MSK: The incision is well healed.  Right long finger swelling and range of motion improving. Neurovascularly intact and has 5/5 thenar and intrinsic musculature strength.    Physical Exam       Diagnostic studies and other clinical records review:  Results       X-rays AP, lateral and oblique right long finger taken today are independently reviewed by me and shows finger swelling but no lytic change in bone to suggest advancing osteomyelitis.    Assessment & Plan: Status post above, doing well     Assessment & Plan       Continue with range of motion in therapy  Continue ID follow up  F/U 4 weeks with new xrays right long finger  Call with any questions/concerns in the interim        Maryjo Chamberlain MD    Please be aware that this note has been generated with the assistance of [employer] voice-to-text.  Please excuse any spelling or grammatical errors.    This note was generated with the assistance of ambient listening technology. Verbal consent was obtained by the patient and accompanying visitor(s) for the recording of patient appointment to facilitate this note. I attest to having reviewed and edited the generated note for accuracy, though some syntax or spelling errors may persist.  Please contact the author of this note for any clarification.

## 2024-06-24 ENCOUNTER — HOSPITAL ENCOUNTER (OUTPATIENT)
Dept: RADIOLOGY | Facility: HOSPITAL | Age: 54
Discharge: HOME | End: 2024-06-24
Attending: ORTHOPAEDIC SURGERY
Payer: COMMERCIAL

## 2024-06-24 ENCOUNTER — CLINICAL SUPPORT (OUTPATIENT)
Dept: REHABILITATION | Facility: HOSPITAL | Age: 54
End: 2024-06-24
Payer: COMMERCIAL

## 2024-06-24 ENCOUNTER — OFFICE VISIT (OUTPATIENT)
Dept: ORTHOPEDICS | Facility: CLINIC | Age: 54
End: 2024-06-24
Payer: COMMERCIAL

## 2024-06-24 DIAGNOSIS — L03.011 CELLULITIS OF RIGHT MIDDLE FINGER: ICD-10-CM

## 2024-06-24 DIAGNOSIS — M79.644 FINGER PAIN, RIGHT: Primary | ICD-10-CM

## 2024-06-24 DIAGNOSIS — S66.929A LACERATION OF TENDON OF FINGER: Primary | ICD-10-CM

## 2024-06-24 DIAGNOSIS — M79.644 FINGER PAIN, RIGHT: ICD-10-CM

## 2024-06-24 DIAGNOSIS — M25.641 STIFFNESS OF FINGER JOINT OF RIGHT HAND: ICD-10-CM

## 2024-06-24 DIAGNOSIS — M65.849 EXTENSOR TENOSYNOVITIS OF FINGER: ICD-10-CM

## 2024-06-24 DIAGNOSIS — M79.644 PAIN OF RIGHT MIDDLE FINGER: Primary | ICD-10-CM

## 2024-06-24 PROCEDURE — 73140 X-RAY EXAM OF FINGER(S): CPT | Mod: TC,RT

## 2024-06-24 PROCEDURE — 99024 POSTOP FOLLOW-UP VISIT: CPT | Mod: S$GLB,,, | Performed by: ORTHOPAEDIC SURGERY

## 2024-06-24 PROCEDURE — 99999 PR PBB SHADOW E&M-EST. PATIENT-LVL III: CPT | Mod: PBBFAC,,, | Performed by: ORTHOPAEDIC SURGERY

## 2024-06-24 PROCEDURE — 97110 THERAPEUTIC EXERCISES: CPT

## 2024-06-24 PROCEDURE — 1160F RVW MEDS BY RX/DR IN RCRD: CPT | Mod: CPTII,S$GLB,, | Performed by: ORTHOPAEDIC SURGERY

## 2024-06-24 PROCEDURE — 73140 X-RAY EXAM OF FINGER(S): CPT | Mod: 26,RT,, | Performed by: RADIOLOGY

## 2024-06-24 PROCEDURE — 3044F HG A1C LEVEL LT 7.0%: CPT | Mod: CPTII,S$GLB,, | Performed by: ORTHOPAEDIC SURGERY

## 2024-06-24 PROCEDURE — 1159F MED LIST DOCD IN RCRD: CPT | Mod: CPTII,S$GLB,, | Performed by: ORTHOPAEDIC SURGERY

## 2024-06-24 RX ORDER — DOCUSATE SODIUM 100 MG/1
100 CAPSULE, LIQUID FILLED ORAL 2 TIMES DAILY
Qty: 30 CAPSULE | Refills: 0 | Status: SHIPPED | OUTPATIENT
Start: 2024-06-24

## 2024-06-24 NOTE — PROGRESS NOTES
[facility]  Occupational Therapy Treatment Note    Date: 6/24/2024  Name: Theo Matthew Jose D  Clinic Number: [id number]    Therapy Diagnosis:   Encounter Diagnoses   Name Primary?    Pain of right middle finger Yes    Stiffness of finger joint of right hand        Physician: Maryjo Chamberlain MD    Physician Orders: Zones III & IV(Central Slip/Boutonniere)   Initiate treatment 3-5 days post -op. Pt to attend therapy 3x/wk.  Fabricate 2-3 splints for immobilization and exercises per [employer] Protocol.  1 hand-based immobilization splint and additional exercise splints per structures repaired are to be fabricated (see below): If zone III, all patients will receive the Immobilization Orthosis 1: Hand based with MCP in 30° flexion volar with PIP & DIP 0- to be worn 100% except for exercise. (). Sutures removed at 10 days post op. 13d for DMII. Exercise to per formed hourly such as instructions below:     Medical Diagnosis: L03.011 (ICD-10-CM) - Cellulitis of right middle finger S66.929A (ICD-10-CM) - Laceration of tendon of finger M65.849 (ICD-10-CM) - Extensor tenosynovitis of finger          Procedure:   Right long finger extensor tendon repair zone III, cpt 79663  2.  Right long finger tenosynovectomy EDC tendons, cpt 134973  3.  Right long finger extensor tenolysis, cpt 43221  4.  Right hand irrigation and sharp excisional debridement of skin, subcutaneous tissue, tendon and bone right long finger, cpt 99845     Evaluation Date: 6/18/2024  Insurance Authorization Period Expiration: 12/31/2024  Plan of Care Certification Period: 9/10/24  Date of Return to MD:  6/24/2024   Visit # / Visits authorized: 1 / 10  FOTO Function Score  53% on 6/14/2024     Precautions:  Standard and blood and bodily fluid and Tendon repair precautions  Time In: 10:30 am  Time Out: 11:25  Total Billable Steve: 55 minutes - 10 min for non-billable MHP      SUBJECTIVE     Pt reports: No pain     Response  to previous treatment:NA  Functional change: NA    Pain: 0/10  Location: right long finger      OBJECTIVE   Objective Measures updated at progress report unless specified.    Observation/Appearance:  Pt presented in custom finger gutter orthosise. Sx site noted to be healthy in appearance with sutures in place. No drainage. Moderate edema long finger, marquez at PIP. PIP flexion contracture resolved. Pt was noted today to be able to flex the PIP to 30 degrees as allowed by protocol. PIP extension nearly full at this time. DIP continues to rest in slight hyperextension.     Edema. Measured in centimeters.    6/18/2024       Right     Long:         P1 7.6      PIP suture     P2 7.2      DIP       P3       Hand ROM. Measured in degrees.    6/18/2024       Right       Limited by post-op restrictions             Long:  MP                  PIP                  DIP                  CHAN                 Treatment      Theo received the treatments listed below  MHP x 10 min with finger in extension     Therapeutic Exercise 40 min  PROM PIP extension   DIP gentle joint blocking  Short Arc PIP  extension /flexion 3/10  reps   Educated in HEP of short arc PIP arom  Fabricated thermoplastic exercise block to prevent greater than 30 PIP flex. This exercise block positions the MP in mild flexion. Educated in proper position this. Pt displayed ability to don/doff. Pt instructed to continue to wear the IP gutter at all times except for performance of HEP.  Re- educated in proper placement of finger gutter orthosis, as pt presented with it a little too distal.   Patient Education and Home Exercises      Education provided:     - Progress towards goals     Written Home Exercises Provided: NT    ASSESSMENT      Sx site remains healthy in appearance with sutures in place. No drainage. Moderate edema long finger, marquez at PIP. PIP flexion contracture resolved. Pt was noted today to be able to flex the PIP to 30 degrees as allowed by protocol.  PIP extension nearly full at this time. DIP continues to rest in slight hyperextension. Added short ark PIP flex/extension today and and instructed to perform at home, 20 reps, 4x/day.       Theo is progressing  towards his goals and there are no updates to goals at this time. Pt prognosis is Excellent.     Pt will continue to benefit from skilled outpatient occupational therapy to address the deficits listed in the problem list on initial evaluation, provide pt/family education and to maximize pt's level of independence in the home and community environment.     Pt's spiritual, cultural and educational needs considered and pt agreeable to plan of care and goals.    Anticipated barriers to occupational therapy: Duration and nature of condiction    Goals:  Long Term Goals (LTGs); to be met by discharge.  LTG #1: Pt will report a pain level of 2 out of 10 with ADLs/IADLs          LTG #2: Pt will demo improved FOTO score by 20 points.   LTG #3: Pt will return to prior level of function for ADLs /IADLs     Short Term Goals (STGs); to be met within 6 weeks (7/30/2024)  STG #1: Pt will report a pain level of 2 out of 10 with use in light ADLs.  STG #2: Pt will demo improved FOTO score by 10 points.   STG #3: Pt will reports independence in light ADL's with use of the involved Hand/wrist/UE  STG #4: Pt will demonstrate independence with issued HEP.   STG #5: Pt will be able to touch tip to palm  STG #6: Pt will be able to extend the PIP to (-10 degrees)       PLAN     Continue skilled occupational therapy with individualized plan of care focusing on progression through protocol  Updates/Grading for next session: Progress along protocol as appropriate    Chrissy Snowden OT

## 2024-06-26 ENCOUNTER — CLINICAL SUPPORT (OUTPATIENT)
Dept: REHABILITATION | Facility: HOSPITAL | Age: 54
End: 2024-06-26
Payer: COMMERCIAL

## 2024-06-26 DIAGNOSIS — M25.641 STIFFNESS OF FINGER JOINT OF RIGHT HAND: ICD-10-CM

## 2024-06-26 DIAGNOSIS — M79.644 PAIN OF RIGHT MIDDLE FINGER: Primary | ICD-10-CM

## 2024-06-26 PROCEDURE — 97110 THERAPEUTIC EXERCISES: CPT

## 2024-06-26 NOTE — PROGRESS NOTES
[facility]  Occupational Therapy Treatment Note    Date: 6/26/2024  Name: Theo Matthew Jose D  Clinic Number: [id number]    Therapy Diagnosis:   Encounter Diagnoses   Name Primary?    Pain of right middle finger Yes    Stiffness of finger joint of right hand          Physician: Maryjo Chamberlain MD    Physician Orders: Zones III & IV(Central Slip/Boutonniere)   Initiate treatment 3-5 days post -op. Pt to attend therapy 3x/wk.  Fabricate 2-3 splints for immobilization and exercises per [facility] [employer] Protocol.  1 hand-based immobilization splint and additional exercise splints per structures repaired are to be fabricated (see below): If zone III, all patients will receive the Immobilization Orthosis 1: Hand based with MCP in 30° flexion volar with PIP & DIP 0- to be worn 100% except for exercise. (). Sutures removed at 10 days post op. 13d for DMII. Exercise to per formed hourly such as instructions below:     Medical Diagnosis: L03.011 (ICD-10-CM) - Cellulitis of right middle finger S66.929A (ICD-10-CM) - Laceration of tendon of finger M65.849 (ICD-10-CM) - Extensor tenosynovitis of finger          Procedure:   Right long finger extensor tendon repair zone III, cpt 71398  2.  Right long finger tenosynovectomy EDC tendons, cpt 285591  3.  Right long finger extensor tenolysis, cpt 50271  4.  Right hand irrigation and sharp excisional debridement of skin, subcutaneous tissue, tendon and bone right long finger, cpt 29956     Evaluation Date: 6/18/2024  Insurance Authorization Period Expiration: 12/31/2024  Plan of Care Certification Period: 9/10/24  Date of Return to MD:  6/24/2024   Visit # / Visits authorized: 1 / 10  FOTO Function Score  53% on 6/14/2024     Precautions:  Standard and blood and bodily fluid and Tendon repair precautions  Time In: 11:00 am  Time Out: 11:37 am  Total Billable Steve: 37 min      SUBJECTIVE     Pt reports: No pain     Response to previous  treatment:NA  Functional change: NA    Pain: 0/10  Location: right long finger      OBJECTIVE   Objective Measures updated at progress report unless specified.    Observation/Appearance:  Pt presented in custom finger gutter orthosise. Sx site noted to be healthy in appearance. Sutures were removed on 6/25/2024.  Mild/Moderate edema long finger, marquez at PIP. PIP flexion contracture resolved. Pt was noted today to be able to flex the PIP to 30 degrees as allowed by protocol. PIP extension nearly full at this time. DIP no longer resting in as much hyperextension.     Edema. Measured in centimeters.    6/18/2024       Right     Long:         P1 7.6      PIP suture     P2 7.2      DIP       P3       Hand ROM. Measured in degrees.    6/18/2024       Right       Limited by post-op restrictions             Long:  MP                  PIP                  DIP                  CHAN                 Treatment      Theo received the treatments listed below  MHP x 10 min with finger in extension     Therapeutic Exercise 27 min  PROM PIP extension   DIP gentle joint blocking 20+ reps - added to HEP. Instructed in proper technique  Short Arc PIP  extension /flexion 2/15+ reps   Reviewed proper technique for the above exercises  Educated in gentle deep friction massage.  Reviewed fit of thermoplastic exercise block. It is designed to prevent greater than 30 PIP flex. This exercise block positions the MP in mild flexion. Pt instructed to continue to wear the IP gutter at all times except for performance of HEP.     Patient Education and Home Exercises      Education provided:     - Progress towards goals     Written Home Exercises Provided: NT    ASSESSMENT      Sx site remains healthy in appearance Sutures have been removed. Moderate edema long finger, marquez at PIP. PIP flexion contracture resolved. Pt was noted today to be able to flex the PIP to 30 degrees as allowed by protocol. PIP extension nearly full at this time. DIP no longer  resting in as much hyperextension. Continue with short ark PIP flex/extension.    Theo is progressing  towards his goals and there are no updates to goals at this time. Pt prognosis is Excellent.     Pt will continue to benefit from skilled outpatient occupational therapy to address the deficits listed in the problem list on initial evaluation, provide pt/family education and to maximize pt's level of independence in the home and community environment.     Pt's spiritual, cultural and educational needs considered and pt agreeable to plan of care and goals.    Anticipated barriers to occupational therapy: Duration and nature of condiction    Goals:  Long Term Goals (LTGs); to be met by discharge.  LTG #1: Pt will report a pain level of 2 out of 10 with ADLs/IADLs          LTG #2: Pt will demo improved FOTO score by 20 points.   LTG #3: Pt will return to prior level of function for ADLs /IADLs     Short Term Goals (STGs); to be met within 6 weeks (7/30/2024)  STG #1: Pt will report a pain level of 2 out of 10 with use in light ADLs.  STG #2: Pt will demo improved FOTO score by 10 points.   STG #3: Pt will reports independence in light ADL's with use of the involved Hand/wrist/UE  STG #4: Pt will demonstrate independence with issued HEP.   STG #5: Pt will be able to touch tip to palm  STG #6: Pt will be able to extend the PIP to (-10 degrees)       PLAN     Continue skilled occupational therapy with individualized plan of care focusing on progression through protocol  Updates/Grading for next session: Progress along protocol as appropriate    Chrissy Snowden OT

## 2024-06-28 ENCOUNTER — LAB VISIT (OUTPATIENT)
Dept: LAB | Facility: HOSPITAL | Age: 54
End: 2024-06-28
Attending: ORTHOPAEDIC SURGERY
Payer: COMMERCIAL

## 2024-06-28 DIAGNOSIS — L03.011 CELLULITIS OF RIGHT MIDDLE FINGER: ICD-10-CM

## 2024-06-28 LAB
ALBUMIN SERPL BCP-MCNC: 4.1 G/DL (ref 3.5–5.2)
ALP SERPL-CCNC: 60 U/L (ref 55–135)
ALT SERPL W/O P-5'-P-CCNC: 25 U/L (ref 10–44)
ANION GAP SERPL CALC-SCNC: 12 MMOL/L (ref 8–16)
AST SERPL-CCNC: 25 U/L (ref 10–40)
BASOPHILS # BLD AUTO: 0.03 K/UL (ref 0–0.2)
BASOPHILS NFR BLD: 0.6 % (ref 0–1.9)
BILIRUB SERPL-MCNC: 0.7 MG/DL (ref 0.1–1)
BUN SERPL-MCNC: 21 MG/DL (ref 6–20)
CALCIUM SERPL-MCNC: 9.6 MG/DL (ref 8.7–10.5)
CHLORIDE SERPL-SCNC: 103 MMOL/L (ref 95–110)
CO2 SERPL-SCNC: 22 MMOL/L (ref 23–29)
CREAT SERPL-MCNC: 1.3 MG/DL (ref 0.5–1.4)
CRP SERPL-MCNC: 3.3 MG/L (ref 0–8.2)
DIFFERENTIAL METHOD BLD: NORMAL
EOSINOPHIL # BLD AUTO: 0 K/UL (ref 0–0.5)
EOSINOPHIL NFR BLD: 0.7 % (ref 0–8)
ERYTHROCYTE [DISTWIDTH] IN BLOOD BY AUTOMATED COUNT: 13.1 % (ref 11.5–14.5)
ERYTHROCYTE [SEDIMENTATION RATE] IN BLOOD BY PHOTOMETRIC METHOD: 30 MM/HR (ref 0–23)
EST. GFR  (NO RACE VARIABLE): >60 ML/MIN/1.73 M^2
GLUCOSE SERPL-MCNC: 98 MG/DL (ref 70–110)
HCT VFR BLD AUTO: 43.5 % (ref 40–54)
HGB BLD-MCNC: 14.4 G/DL (ref 14–18)
IMM GRANULOCYTES # BLD AUTO: 0.01 K/UL (ref 0–0.04)
IMM GRANULOCYTES NFR BLD AUTO: 0.2 % (ref 0–0.5)
LYMPHOCYTES # BLD AUTO: 2.3 K/UL (ref 1–4.8)
LYMPHOCYTES NFR BLD: 41.7 % (ref 18–48)
MCH RBC QN AUTO: 29.3 PG (ref 27–31)
MCHC RBC AUTO-ENTMCNC: 33.1 G/DL (ref 32–36)
MCV RBC AUTO: 89 FL (ref 82–98)
MONOCYTES # BLD AUTO: 0.6 K/UL (ref 0.3–1)
MONOCYTES NFR BLD: 10.7 % (ref 4–15)
NEUTROPHILS # BLD AUTO: 2.5 K/UL (ref 1.8–7.7)
NEUTROPHILS NFR BLD: 46.1 % (ref 38–73)
NRBC BLD-RTO: 0 /100 WBC
PLATELET # BLD AUTO: 231 K/UL (ref 150–450)
PMV BLD AUTO: 11.6 FL (ref 9.2–12.9)
POTASSIUM SERPL-SCNC: 3.9 MMOL/L (ref 3.5–5.1)
PROT SERPL-MCNC: 7.8 G/DL (ref 6–8.4)
RBC # BLD AUTO: 4.91 M/UL (ref 4.6–6.2)
SODIUM SERPL-SCNC: 137 MMOL/L (ref 136–145)
WBC # BLD AUTO: 5.42 K/UL (ref 3.9–12.7)

## 2024-06-28 PROCEDURE — 85025 COMPLETE CBC W/AUTO DIFF WBC: CPT | Performed by: INTERNAL MEDICINE

## 2024-06-28 PROCEDURE — 86140 C-REACTIVE PROTEIN: CPT | Performed by: INTERNAL MEDICINE

## 2024-06-28 PROCEDURE — 80053 COMPREHEN METABOLIC PANEL: CPT | Performed by: INTERNAL MEDICINE

## 2024-06-28 PROCEDURE — 36415 COLL VENOUS BLD VENIPUNCTURE: CPT | Performed by: INTERNAL MEDICINE

## 2024-06-28 PROCEDURE — 85652 RBC SED RATE AUTOMATED: CPT | Performed by: INTERNAL MEDICINE

## 2024-07-01 ENCOUNTER — OFFICE VISIT (OUTPATIENT)
Dept: INFECTIOUS DISEASES | Facility: CLINIC | Age: 54
End: 2024-07-01
Payer: COMMERCIAL

## 2024-07-01 VITALS
SYSTOLIC BLOOD PRESSURE: 129 MMHG | DIASTOLIC BLOOD PRESSURE: 81 MMHG | TEMPERATURE: 98 F | HEART RATE: 68 BPM | WEIGHT: 180.31 LBS | BODY MASS INDEX: 29.11 KG/M2

## 2024-07-01 DIAGNOSIS — S66.929A LACERATION OF TENDON OF FINGER: ICD-10-CM

## 2024-07-01 DIAGNOSIS — B96.5 PSEUDOMONAS ARTHRITIS: ICD-10-CM

## 2024-07-01 DIAGNOSIS — M00.80 PSEUDOMONAS ARTHRITIS: ICD-10-CM

## 2024-07-01 DIAGNOSIS — L03.011 CELLULITIS OF RIGHT MIDDLE FINGER: Primary | ICD-10-CM

## 2024-07-01 DIAGNOSIS — M77.9 EXTENSOR TENDON ADHESIONS: ICD-10-CM

## 2024-07-01 PROCEDURE — 3008F BODY MASS INDEX DOCD: CPT | Mod: CPTII,S$GLB,, | Performed by: INTERNAL MEDICINE

## 2024-07-01 PROCEDURE — 1159F MED LIST DOCD IN RCRD: CPT | Mod: CPTII,S$GLB,, | Performed by: INTERNAL MEDICINE

## 2024-07-01 PROCEDURE — 3074F SYST BP LT 130 MM HG: CPT | Mod: CPTII,S$GLB,, | Performed by: INTERNAL MEDICINE

## 2024-07-01 PROCEDURE — 99999 PR PBB SHADOW E&M-EST. PATIENT-LVL II: CPT | Mod: PBBFAC,,, | Performed by: INTERNAL MEDICINE

## 2024-07-01 PROCEDURE — 99213 OFFICE O/P EST LOW 20 MIN: CPT | Mod: S$GLB,,, | Performed by: INTERNAL MEDICINE

## 2024-07-01 PROCEDURE — 3079F DIAST BP 80-89 MM HG: CPT | Mod: CPTII,S$GLB,, | Performed by: INTERNAL MEDICINE

## 2024-07-01 PROCEDURE — 3044F HG A1C LEVEL LT 7.0%: CPT | Mod: CPTII,S$GLB,, | Performed by: INTERNAL MEDICINE

## 2024-07-01 PROCEDURE — 1111F DSCHRG MED/CURRENT MED MERGE: CPT | Mod: CPTII,S$GLB,, | Performed by: INTERNAL MEDICINE

## 2024-07-01 PROCEDURE — 1160F RVW MEDS BY RX/DR IN RCRD: CPT | Mod: CPTII,S$GLB,, | Performed by: INTERNAL MEDICINE

## 2024-07-01 NOTE — PROGRESS NOTES
Subjective     Patient ID: Theo Matthew Jr. is a 54 y.o. male.    Chief Complaint:No chief complaint on file.      History of Present Illness    Patient is a 54 y.o. right hand dominant male PMH of HLD who presents today due to right long finger dorsal cellulitis caused by a laceration from a fishing knife 5.25.24. Had scaled fish and was sharpening to filet. Cut finger. Cleaned and applied neosporin.  3 days later worsened with swelling of finger and up hand to fist. Went to  and antibiotics and steroids given. Swelling improved but went for follow up and admitted.  I&D was performed by an outside hospital with serosanguineous drainage and cultures were sent.  He was transferred to [facility] on 06/03/2024 and MRI showed cellulitis and mild extensor tenosynovitis.  He received IV antibiotics while inpatient.  He was discharged on oral doxycycline and amoxicillin.       He is retired but very active with fishing, working in yard, around the house.     Tetanus vaccine in 2016.     Underwent on 6/11/24:  Post-Operative Diagnosis: Post-Op Diagnosis Codes:     * Cellulitis of right middle finger [L03.011]     * Laceration of tendon of finger [S66.929A]  Right long finger traumatic arthrotomy  Right long extensor tenosynovitis  Right long extensor tendon adhesions     Cultures from surgery tendon/joint now positive for pseudomonas. S to cipro.    Started Cipro 6/14/24.  No difficulty tolerating. Does not drink a lot of water and underwent fast for 3 days prior to labs being drawn.  Participating in PT and exercises. Is moving DIP. Not yet PIP.    Review of Systems   Constitutional: Negative for chills, decreased appetite, fever, malaise/fatigue, night sweats, weight gain and weight loss.   HENT:  Negative for congestion, ear pain, hearing loss, hoarse voice, sore throat and tinnitus.    Eyes:  Negative for blurred vision, redness and visual disturbance.   Cardiovascular:  Negative for chest pain, leg  swelling and palpitations.   Respiratory:  Negative for cough, hemoptysis, shortness of breath, sputum production and wheezing.    Hematologic/Lymphatic: Negative for adenopathy. Does not bruise/bleed easily.   Skin:  Negative for dry skin, itching, rash and suspicious lesions.   Musculoskeletal:  Negative for back pain, joint pain, myalgias and neck pain.   Gastrointestinal:  Negative for abdominal pain, constipation, diarrhea, heartburn, nausea and vomiting.   Genitourinary:  Negative for dysuria, flank pain, frequency, hematuria, hesitancy and urgency.   Neurological:  Negative for dizziness, headaches, numbness, paresthesias and weakness.   Psychiatric/Behavioral:  Negative for depression and memory loss. The patient does not have insomnia and is not nervous/anxious.         Objective   Physical Exam  Vitals and nursing note reviewed.   Constitutional:       General: He is not in acute distress.     Appearance: Normal appearance. He is well-developed. He is not toxic-appearing or diaphoretic.   HENT:      Head: Normocephalic and atraumatic. No right periorbital erythema or left periorbital erythema.      Right Ear: External ear normal.      Left Ear: External ear normal.      Nose: Nose normal.   Eyes:      General: Lids are normal. No scleral icterus.        Right eye: No discharge.         Left eye: No discharge.      Conjunctiva/sclera:      Right eye: Right conjunctiva is not injected.      Left eye: Left conjunctiva is not injected.      Pupils: Pupils are equal, round, and reactive to light.   Cardiovascular:      Rate and Rhythm: Normal rate.   Pulmonary:      Effort: Pulmonary effort is normal. No tachypnea or respiratory distress.      Breath sounds: No stridor.   Abdominal:      General: There is no distension.   Musculoskeletal:         General: Swelling present.      Cervical back: Normal range of motion. No erythema.      Comments: Right dip decreased ROM.  PIP swollen.  No erythema.   Skin:      General: Skin is warm and dry.      Findings: No erythema or rash.   Neurological:      Mental Status: He is alert and oriented to person, place, and time.      Cranial Nerves: No cranial nerve deficit.      Coordination: Coordination normal.   Psychiatric:         Speech: Speech normal.         Behavior: Behavior normal. Behavior is cooperative.         Thought Content: Thought content normal.         Judgment: Judgment normal.            Assessment and Plan     1. Cellulitis of right middle finger    2. Laceration of tendon of finger    3. Pseudomonas arthritis    4. Extensor tendon adhesions        Plan:  Diagnoses and all orders for this visit:    Cellulitis of right middle finger  -     Comprehensive Metabolic Panel; Future  -     C-reactive protein; Future  -     CBC Auto Differential; Future  -     Sedimentation rate; Future    Laceration of tendon of finger  -     Comprehensive Metabolic Panel; Future  -     C-reactive protein; Future  -     CBC Auto Differential; Future  -     Sedimentation rate; Future    Pseudomonas arthritis  -     Comprehensive Metabolic Panel; Future  -     C-reactive protein; Future  -     CBC Auto Differential; Future  -     Sedimentation rate; Future    Extensor tendon adhesions          Reviewed all labs and culture results.  Concern for extensive infection and will treat as osteomyelitis per Dr. Chamberlain.    Cipro 750mg po bid for 6 weeks- through 7/26.  Counseled about side effects.  Increase water to 6 cups per day.  Counseled regarding preventing injuries, cleaning wounds.     Continue cipro and follow up labs and visit 2-3 weeks.

## 2024-07-02 NOTE — PROGRESS NOTES
[facility]  Occupational Therapy Treatment Note    Date: 7/3/2024  Name: Theo Matthew Jose D  Clinic Number: [id number]    Therapy Diagnosis:   Encounter Diagnoses   Name Primary?    Pain of right middle finger Yes    Stiffness of finger joint of right hand            Physician: Maryjo Chamberlain MD    Physician Orders: Zones III & IV(Central Slip/Boutonniere)   Initiate treatment 3-5 days post -op. Pt to attend therapy 3x/wk.  Fabricate 2-3 splints for immobilization and exercises per [facility] [employer] [facility] Protocol.  1 hand-based immobilization splint and additional exercise splints per structures repaired are to be fabricated (see below): If zone III, all patients will receive the Immobilization Orthosis 1: Hand based with MCP in 30° flexion volar with PIP & DIP 0- to be worn 100% except for exercise. (). Sutures removed at 10 days post op. 13d for DMII. Exercise to per formed hourly such as instructions below:     Medical Diagnosis: L03.011 (ICD-10-CM) - Cellulitis of right middle finger S66.929A (ICD-10-CM) - Laceration of tendon of finger M65.849 (ICD-10-CM) - Extensor tenosynovitis of finger        DOS: 6/11/2024  Procedure:   Right long finger extensor tendon repair zone III, cpt 34389  2.  Right long finger tenosynovectomy EDC tendons, cpt 549761  3.  Right long finger extensor tenolysis, cpt 20583  4.  Right hand irrigation and sharp excisional debridement of skin, subcutaneous tissue, tendon and bone right long finger, cpt 33452     Evaluation Date: 6/18/2024  Insurance Authorization Period Expiration: 12/31/2024  Plan of Care Certification Period: 9/10/24  Date of Return to MD:  6/24/2024   Visit # / Visits authorized: 1 / 10  FOTO Function Score  53% on 6/14/2024     Precautions:  Standard and blood and bodily fluid and Tendon repair precautions  Time In: 11:00 am  Time Out: 11:52 am  Total Billable Steve: 45 min      SUBJECTIVE     Pt reports: No pain and reports compliance with  wear of orthosis and with HEP.     Response to previous treatment:NA  Functional change: NA    Pain: 0/10  Location: right long finger      OBJECTIVE   Objective Measures updated at progress report unless specified.    Observation/Appearance:  Pt presented in custom finger gutter orthosise. Mild/Moderate edema long finger, marquez at PIP remains but visibly decreased. PIP flexion contracture resolved but end-range tightness remains.  Minimal PIP extension lag present. DIP hyperextension is now minimal.  Edema. Measured in centimeters.    6/18/2024       Right     Long:         P1 7.6      PIP suture     P2 7.2      DIP       P3       Hand ROM. Measured in degrees.    6/18/2024 7/3/2024      Right RIght      Limited by post-op restrictions             Long:  MP                  PIP   -8/ 40 degrees to limits of exercise orthosis               DIP                  CHAN                 Treatment    3 wks, 1 day s/p  Theo received the treatments listed below  Patient received paraffin bath to left hand  with MHP x with  finger in extension   for 12 minutes to increase blood flow, circulation, pain management and for tissue elasticity prior to therex.  MHP x 10 min with finger in extension     Therapeutic Exercise 35 min  PROM PIP extension   Educated in self passive PIP extension on table top  DIP gentle joint blocking 2/10+ reps  -Deep friction massage to sx site  Short Arc PIP  extension /flexion 2/15+ reps   - gentle retrograde massage -educated in and added to hep.  - Heated and remolded flexion block portion of thermoplastic exercise block allow 40 degrees of PIP now.  This exercise block positions the MP in mild flexion. Pt instructed to continue to wear the IP gutter at all times except for performance of HEP.   - Reviewed proper technique of short arc PIP flex/extension exercise  Patient Education and Home Exercises      Education provided:     - Progress towards goals     Written Home Exercises Provided:  NT    ASSESSMENT   Mild/Moderate edema long finger, marquez at PIP remains but visibly decreased. PIP flexion contracture resolved but end-range tightness remains.  Minimal PIP extension lag present. DIP hyperextension is now minimal.     Theo is progressing  towards his goals and there are no updates to goals at this time. Pt prognosis is Excellent.     Pt will continue to benefit from skilled outpatient occupational therapy to address the deficits listed in the problem list on initial evaluation, provide pt/family education and to maximize pt's level of independence in the home and community environment.     Pt's spiritual, cultural and educational needs considered and pt agreeable to plan of care and goals.    Anticipated barriers to occupational therapy: Duration and nature of condiction    Goals:  Long Term Goals (LTGs); to be met by discharge.  LTG #1: Pt will report a pain level of 2 out of 10 with ADLs/IADLs          LTG #2: Pt will demo improved FOTO score by 20 points.   LTG #3: Pt will return to prior level of function for ADLs /IADLs     Short Term Goals (STGs); to be met within 6 weeks (7/30/2024)  STG #1: Pt will report a pain level of 2 out of 10 with use in light ADLs.  STG #2: Pt will demo improved FOTO score by 10 points.   STG #3: Pt will reports independence in light ADL's with use of the involved Hand/wrist/UE  STG #4: Pt will demonstrate independence with issued HEP.   STG #5: Pt will be able to touch tip to palm  STG #6: Pt will be able to extend the PIP to (-10 degrees)       PLAN     Continue skilled occupational therapy with individualized plan of care focusing on progression through protocol  Updates/Grading for next session: Progress along protocol as appropriate    Chrissy Snowden OT

## 2024-07-03 ENCOUNTER — CLINICAL SUPPORT (OUTPATIENT)
Dept: REHABILITATION | Facility: HOSPITAL | Age: 54
End: 2024-07-03
Payer: COMMERCIAL

## 2024-07-03 DIAGNOSIS — M25.641 STIFFNESS OF FINGER JOINT OF RIGHT HAND: ICD-10-CM

## 2024-07-03 DIAGNOSIS — M79.644 PAIN OF RIGHT MIDDLE FINGER: Primary | ICD-10-CM

## 2024-07-03 PROCEDURE — 97018 PARAFFIN BATH THERAPY: CPT

## 2024-07-03 PROCEDURE — 97110 THERAPEUTIC EXERCISES: CPT

## 2024-07-08 ENCOUNTER — CLINICAL SUPPORT (OUTPATIENT)
Dept: REHABILITATION | Facility: HOSPITAL | Age: 54
End: 2024-07-08
Payer: COMMERCIAL

## 2024-07-08 DIAGNOSIS — M25.641 STIFFNESS OF FINGER JOINT OF RIGHT HAND: ICD-10-CM

## 2024-07-08 DIAGNOSIS — M79.644 PAIN OF RIGHT MIDDLE FINGER: Primary | ICD-10-CM

## 2024-07-08 PROCEDURE — 97018 PARAFFIN BATH THERAPY: CPT

## 2024-07-08 PROCEDURE — 97140 MANUAL THERAPY 1/> REGIONS: CPT

## 2024-07-08 PROCEDURE — 97110 THERAPEUTIC EXERCISES: CPT

## 2024-07-10 ENCOUNTER — CLINICAL SUPPORT (OUTPATIENT)
Dept: REHABILITATION | Facility: HOSPITAL | Age: 54
End: 2024-07-10
Payer: COMMERCIAL

## 2024-07-10 DIAGNOSIS — M25.641 STIFFNESS OF FINGER JOINT OF RIGHT HAND: ICD-10-CM

## 2024-07-10 DIAGNOSIS — M79.644 PAIN OF RIGHT MIDDLE FINGER: Primary | ICD-10-CM

## 2024-07-10 PROCEDURE — 97018 PARAFFIN BATH THERAPY: CPT

## 2024-07-10 PROCEDURE — 97140 MANUAL THERAPY 1/> REGIONS: CPT

## 2024-07-10 PROCEDURE — 97110 THERAPEUTIC EXERCISES: CPT

## 2024-07-10 NOTE — PROGRESS NOTES
[facility]  Occupational Therapy Treatment Note    Date: 7/10/2024  Name: Theo Matthew Jose D  Clinic Number: [id number]    Therapy Diagnosis:   Encounter Diagnoses   Name Primary?    Pain of right middle finger Yes    Stiffness of finger joint of right hand              Physician: Maryjo Chamberlain MD    Physician Orders: Zones III & IV(Central Slip/Boutonniere)   Initiate treatment 3-5 days post -op. Pt to attend therapy 3x/wk.  Fabricate 2-3 splints for immobilization and exercises per [facility] [employer] Protocol.  1 hand-based immobilization splint and additional exercise splints per structures repaired are to be fabricated (see below): If zone III, all patients will receive the Immobilization Orthosis 1: Hand based with MCP in 30° flexion volar with PIP & DIP 0- to be worn 100% except for exercise. (). Sutures removed at 10 days post op. 13d for DMII. Exercise to per formed hourly such as instructions below:     Medical Diagnosis: L03.011 (ICD-10-CM) - Cellulitis of right middle finger S66.929A (ICD-10-CM) - Laceration of tendon of finger M65.849 (ICD-10-CM) - Extensor tenosynovitis of finger        DOS: 6/11/2024  Procedure:   Right long finger extensor tendon repair zone III, cpt 79133  2.  Right long finger tenosynovectomy EDC tendons, cpt 346390  3.  Right long finger extensor tenolysis, cpt 62773  4.  Right hand irrigation and sharp excisional debridement of skin, subcutaneous tissue, tendon and bone right long finger, cpt 93525     Evaluation Date: 6/18/2024  Insurance Authorization Period Expiration: 12/31/2024  Plan of Care Certification Period: 9/10/24  Date of Return to MD:  6/24/2024   Visit # / Visits authorized: 1 / 10  FOTO Function Score  53% on 6/14/2024     Precautions:  Standard and blood and bodily fluid and Tendon repair precautions  Time In: 11:00 am  Time Out: 11:52 am  Total Billable Steve: 45 min      SUBJECTIVE     Pt reports: No pain and reports compliance  with wear of orthosis and with HEP.     Response to previous treatment:NA  Functional change: NA    Pain: 0/10  Location: right long finger      OBJECTIVE   Objective Measures updated at progress report unless specified.    Observation/Appearance:  Pt presented in custom finger gutter orthosise. Mild/Moderate edema long finger, marquez at PIP remains but visibly decreased. PIP flexion contracture resolved but end-range tightness remains.  Minimal PIP extension lag present. DIP hyperextension is now minimal.  Edema. Measured in centimeters.    6/18/2024       Right     Long:         P1 7.6      PIP suture     P2 7.2      DIP       P3       Hand ROM. Measured in degrees.    6/18/2024 7/3/2024      Right RIght      Limited by post-op restrictions             Long:  MP                  PIP   -8/ 40 degrees to limits of exercise orthosis               DIP                  CHAN                 Treatment    3 wks, 6 day s/p  Theo received the treatments listed below  Patient received paraffin bath to left hand  with MHP x with  finger in extension   for 12 minutes to increase blood flow, circulation, pain management and for tissue elasticity prior to therex.  MHP x 10 min with finger in extension     Therapeutic Exercise 35 min  PROM PIP extension   Educated in self passive PIP extension on table top  DIP gentle joint blocking 2/10+ reps  -Deep friction massage to sx site  Short Arc PIP  extension /flexion 2/15+ reps   - gentle retrograde massage -educated in and added to hep.  - Heated and remolded flexion block portion of thermoplastic exercise block allow 40 degrees of PIP now.  This exercise block positions the MP in mild flexion. Pt instructed to continue to wear the IP gutter at all times except for performance of HEP.   - Reviewed proper technique of short arc PIP flex/extension exercise  Patient Education and Home Exercises      Education provided:     - Progress towards goals     Written Home Exercises Provided:  NT    ASSESSMENT   Tightness remains present within current precautions. PIP lag remains present at rest. Plan to progress as tolerated.      Theo is progressing  towards his goals and there are no updates to goals at this time. Pt prognosis is Excellent.     Pt will continue to benefit from skilled outpatient occupational therapy to address the deficits listed in the problem list on initial evaluation, provide pt/family education and to maximize pt's level of independence in the home and community environment.     Pt's spiritual, cultural and educational needs considered and pt agreeable to plan of care and goals.    Anticipated barriers to occupational therapy: Duration and nature of condiction    Goals:  Long Term Goals (LTGs); to be met by discharge.  LTG #1: Pt will report a pain level of 2 out of 10 with ADLs/IADLs          LTG #2: Pt will demo improved FOTO score by 20 points.   LTG #3: Pt will return to prior level of function for ADLs /IADLs     Short Term Goals (STGs); to be met within 6 weeks (7/30/2024)  STG #1: Pt will report a pain level of 2 out of 10 with use in light ADLs.  STG #2: Pt will demo improved FOTO score by 10 points.   STG #3: Pt will reports independence in light ADL's with use of the involved Hand/wrist/UE  STG #4: Pt will demonstrate independence with issued HEP.   STG #5: Pt will be able to touch tip to palm  STG #6: Pt will be able to extend the PIP to (-10 degrees)       PLAN     Continue skilled occupational therapy with individualized plan of care focusing on progression through protocol  Updates/Grading for next session: Progress along protocol as appropriate    Tracy Gardner OT

## 2024-07-12 ENCOUNTER — TELEPHONE (OUTPATIENT)
Dept: ORTHOPEDICS | Facility: CLINIC | Age: 54
End: 2024-07-12
Payer: COMMERCIAL

## 2024-07-12 NOTE — TELEPHONE ENCOUNTER
Patient communication     Notified patient to stop at [facility] - 1st floor check in [address] [facility] [address] 30 minutes prior to your appointment time on 7/15/24 with Dr. Chamberlain for x-rays.     Made them aware that this is not a scheduled xray appointment and they might be running behind as they are considered a walk-in xray.    Verbalized the Following:  *Please arrive at your informed time above, if you are more than 15 Minutes late to your appointment with Dr. Chamberlain we will have to reschedule your appointment. This will allow you to be seen in a timely manor and be conscious to other patients being seen that same day*

## 2024-07-15 ENCOUNTER — OFFICE VISIT (OUTPATIENT)
Dept: ORTHOPEDICS | Facility: CLINIC | Age: 54
End: 2024-07-15
Payer: COMMERCIAL

## 2024-07-15 ENCOUNTER — HOSPITAL ENCOUNTER (OUTPATIENT)
Dept: RADIOLOGY | Facility: HOSPITAL | Age: 54
Discharge: HOME | End: 2024-07-15
Attending: ORTHOPAEDIC SURGERY
Payer: COMMERCIAL

## 2024-07-15 ENCOUNTER — CLINICAL SUPPORT (OUTPATIENT)
Dept: REHABILITATION | Facility: HOSPITAL | Age: 54
End: 2024-07-15
Payer: COMMERCIAL

## 2024-07-15 DIAGNOSIS — L03.011 CELLULITIS OF RIGHT MIDDLE FINGER: ICD-10-CM

## 2024-07-15 DIAGNOSIS — Z98.890 POST-OPERATIVE STATE: Primary | ICD-10-CM

## 2024-07-15 DIAGNOSIS — M79.644 FINGER PAIN, RIGHT: Primary | ICD-10-CM

## 2024-07-15 DIAGNOSIS — S66.929A LACERATION OF TENDON OF FINGER: ICD-10-CM

## 2024-07-15 DIAGNOSIS — M79.644 PAIN OF RIGHT MIDDLE FINGER: Primary | ICD-10-CM

## 2024-07-15 DIAGNOSIS — M25.641 STIFFNESS OF FINGER JOINT OF RIGHT HAND: ICD-10-CM

## 2024-07-15 DIAGNOSIS — M65.849 EXTENSOR TENOSYNOVITIS OF FINGER: ICD-10-CM

## 2024-07-15 DIAGNOSIS — M79.644 FINGER PAIN, RIGHT: ICD-10-CM

## 2024-07-15 PROCEDURE — 3044F HG A1C LEVEL LT 7.0%: CPT | Mod: CPTII,S$GLB,, | Performed by: ORTHOPAEDIC SURGERY

## 2024-07-15 PROCEDURE — 73140 X-RAY EXAM OF FINGER(S): CPT | Mod: 26,RT,, | Performed by: RADIOLOGY

## 2024-07-15 PROCEDURE — 73140 X-RAY EXAM OF FINGER(S): CPT | Mod: TC,RT

## 2024-07-15 PROCEDURE — 97010 HOT OR COLD PACKS THERAPY: CPT

## 2024-07-15 PROCEDURE — 97110 THERAPEUTIC EXERCISES: CPT

## 2024-07-15 PROCEDURE — 1159F MED LIST DOCD IN RCRD: CPT | Mod: CPTII,S$GLB,, | Performed by: ORTHOPAEDIC SURGERY

## 2024-07-15 PROCEDURE — 99024 POSTOP FOLLOW-UP VISIT: CPT | Mod: S$GLB,,, | Performed by: ORTHOPAEDIC SURGERY

## 2024-07-15 PROCEDURE — 99999 PR PBB SHADOW E&M-EST. PATIENT-LVL III: CPT | Mod: PBBFAC,,, | Performed by: ORTHOPAEDIC SURGERY

## 2024-07-15 PROCEDURE — 1160F RVW MEDS BY RX/DR IN RCRD: CPT | Mod: CPTII,S$GLB,, | Performed by: ORTHOPAEDIC SURGERY

## 2024-07-15 PROCEDURE — 97140 MANUAL THERAPY 1/> REGIONS: CPT

## 2024-07-15 NOTE — PROGRESS NOTES
[facility]  Occupational Therapy Treatment Note    Date: 7/15/2024  Name: Theo Matthew Jose D  Clinic Number: [id number]    Therapy Diagnosis:   Encounter Diagnoses   Name Primary?    Pain of right middle finger Yes    Stiffness of finger joint of right hand                Physician: Maryjo Chamberlain MD    Physician Orders: Zones III & IV(Central Slip/Boutonniere)   Initiate treatment 3-5 days post -op. Pt to attend therapy 3x/wk.  Fabricate 2-3 splints for immobilization and exercises per [facility] [employer] Protocol.  1 hand-based immobilization splint and additional exercise splints per structures repaired are to be fabricated (see below): If zone III, all patients will receive the Immobilization Orthosis 1: Hand based with MCP in 30° flexion volar with PIP & DIP 0- to be worn 100% except for exercise. (). Sutures removed at 10 days post op. 13d for DMII. Exercise to per formed hourly such as instructions below:     Medical Diagnosis: L03.011 (ICD-10-CM) - Cellulitis of right middle finger S66.929A (ICD-10-CM) - Laceration of tendon of finger M65.849 (ICD-10-CM) - Extensor tenosynovitis of finger        DOS: 6/11/2024  Procedure:   Right long finger extensor tendon repair zone III, cpt 21243  2.  Right long finger tenosynovectomy EDC tendons, cpt 338086  3.  Right long finger extensor tenolysis, cpt 98194  4.  Right hand irrigation and sharp excisional debridement of skin, subcutaneous tissue, tendon and bone right long finger, cpt 80242     Evaluation Date: 6/18/2024  Insurance Authorization Period Expiration: 12/31/2024  Plan of Care Certification Period: 9/10/24  Date of Return to MD:  6/24/2024   Visit # / Visits authorized: 9/ 10  FOTO Function Score  53% on 6/14/2024     Precautions:  Standard and blood and bodily fluid and Tendon repair precautions  Time In: 3:30am  Time Out: 4:30 am  Total Billable Steve: 60 min      SUBJECTIVE     Pt reports: Tightness and soreness with AROM      Response to previous treatment:NA  Functional change: NA    Pain: 0/10  Location: right long finger      OBJECTIVE   Objective Measures updated at progress report unless specified.    Observation/Appearance:  Pt presented in custom finger gutter orthosise. Mild/Moderate edema long finger, marquez at PIP remains but visibly decreased. PIP flexion contracture resolved but end-range tightness remains.  Minimal PIP extension lag present. DIP hyperextension is now minimal.  Edema. Measured in centimeters.    6/18/2024       Right     Long:         P1 7.6      PIP suture     P2 7.2      DIP       P3       Hand ROM. Measured in degrees.    6/18/2024 7/3/2024      Right RIght      Limited by post-op restrictions             Long:  MP                  PIP   -8/ 40 degrees to limits of exercise orthosis               DIP                  CHAN                 Treatment    3 wks, 6 day s/p  Theo received the treatments listed below  Patient received paraffin bath to left hand  with MHP x with  finger in extension   for 12 minutes to increase blood flow, circulation, pain management and for tissue elasticity prior to therex.  MHP x 10 min with finger in extension     Therapeutic Exercise 35 min  PROM PIP extension   Educated in self passive PIP extension on table top  DIP gentle joint blocking 2/10+ reps  -Deep friction massage to sx site  Short Arc PIP  extension /flexion 2/15+ reps   - gentle retrograde massage -educated in and added to hep.  - Heated and remolded flexion block portion of thermoplastic exercise block allow 40 degrees of PIP now.  This exercise block positions the MP in mild flexion. Pt instructed to continue to wear the IP gutter at all times except for performance of HEP.   - Reviewed proper technique of short arc PIP flex/extension exercise  Patient Education and Home Exercises      Education provided:     - Progress towards goals     Written Home Exercises Provided: NT    ASSESSMENT   Theo continues with  significnat tightness as we progress through protocol. He reports most tightness to be present over PIP joint. Mild extension lag noted to be present at PIP, however swelling remains limiting accuracy of measures. Plan to wean out of orthosis with light activity throughout this week.     Theo is progressing  towards his goals and there are no updates to goals at this time. Pt prognosis is Excellent.     Pt will continue to benefit from skilled outpatient occupational therapy to address the deficits listed in the problem list on initial evaluation, provide pt/family education and to maximize pt's level of independence in the home and community environment.     Pt's spiritual, cultural and educational needs considered and pt agreeable to plan of care and goals.    Anticipated barriers to occupational therapy: Duration and nature of condiction    Goals:  Long Term Goals (LTGs); to be met by discharge.  LTG #1: Pt will report a pain level of 2 out of 10 with ADLs/IADLs          LTG #2: Pt will demo improved FOTO score by 20 points.   LTG #3: Pt will return to prior level of function for ADLs /IADLs     Short Term Goals (STGs); to be met within 6 weeks (7/30/2024)  STG #1: Pt will report a pain level of 2 out of 10 with use in light ADLs.  STG #2: Pt will demo improved FOTO score by 10 points.   STG #3: Pt will reports independence in light ADL's with use of the involved Hand/wrist/UE  STG #4: Pt will demonstrate independence with issued HEP.   STG #5: Pt will be able to touch tip to palm  STG #6: Pt will be able to extend the PIP to (-10 degrees)       PLAN     Continue skilled occupational therapy with individualized plan of care focusing on progression through protocol  Updates/Grading for next session: Progress along protocol as appropriate    Tracy Gardner OT

## 2024-07-15 NOTE — PROGRESS NOTES
Theo Matthew Jr. presents for post-operative evaluation of   Encounter Diagnoses   Name Primary?    Post-operative state Yes    Extensor tenosynovitis of finger     Laceration of tendon of finger     Cellulitis of right middle finger    .   History of Present Illness     The patient is now 4w 6d s/p 6.11.24 Right long finger I&D, extensor tendon + lateral band + central slip repair, extensor tenosynovectomy; intra-op cx + pseudomonas aeruginosa.  Patient has been compliant with finger gutter orthosis. Mild swelling over dorsal finger. Overall the patient reports doing well.  His therapist notes an increase in swelling compared to the previous visit, even during exercise. She is uncertain if this could be contributing to this swelling.    Labs: 6/28/24  WBC 5.42, ESR 30, CRP 3.3      ID: 6.14.24 - oral Cipro for 6 weeks.     Vitals:    07/15/24 1106   PainSc:   1   PainLoc: Hand       PE:    AA&O x 4.  NAD  HEENT:  NCAT, sclera nonicteric  Lungs:  Respirations are equal and unlabored.  CV:  2+ bilateral upper and lower extremity pulses.  MSK: The incision is well healed.  Right long finger swelling and range of motion improving. Neurovascularly intact and has 5/5 thenar and intrinsic musculature strength.              Physical Exam       Diagnostic studies and other clinical records review:  Results       X-rays AP, lateral and oblique right long finger taken today are independently reviewed by me and shows finger swelling but no lytic change in bone to suggest advancing osteomyelitis.    Assessment & Plan: Status post above, doing well     Assessment & Plan  1. Postoperative follow-up.  Scar tissue is present, but it is beginning to move. The x-ray results are satisfactory, indicating that the bone is functioning well with no signs of bone spread or infection. The joint is functioning well, but there is significant scar tissue felt on the top. The swelling is likely due to stretching of the knuckle and scar tissue  during exercise. A new phase of therapy will be initiated to strengthen the tendon. It may take up to 6 weeks for the tendon to heal and regain strength. The joint should be kept moving, but to protect it.  The new phase will allow more force across the knuckle and improve motion. It may take another couple of months for the knuckle to fully stretch out due to the trauma and infection. Once strengthening exercises are initiated, a splint will be provided for gentle stretching of the scar tissue in a couple of weeks. If the blood work results are normal, the antibiotics will be discontinued.    Follow-up  A follow-up appointment is scheduled for 1 month from now.     Continue with range of motion in therapy  Continue ID follow up, next visit 7/25/24  F/U 4 weeks with new xrays right long finger  Call with any questions/concerns in the interim        Maryjo Chamberlain MD    Please be aware that this note has been generated with the assistance of [employer] voice-to-text.  Please excuse any spelling or grammatical errors.    This note was generated with the assistance of ambient listening technology. Verbal consent was obtained by the patient and accompanying visitor(s) for the recording of patient appointment to facilitate this note. I attest to having reviewed and edited the generated note for accuracy, though some syntax or spelling errors may persist. Please contact the author of this note for any clarification.

## 2024-07-17 ENCOUNTER — CLINICAL SUPPORT (OUTPATIENT)
Dept: REHABILITATION | Facility: HOSPITAL | Age: 54
End: 2024-07-17
Payer: COMMERCIAL

## 2024-07-17 DIAGNOSIS — M25.641 STIFFNESS OF FINGER JOINT OF RIGHT HAND: ICD-10-CM

## 2024-07-17 DIAGNOSIS — M79.644 PAIN OF RIGHT MIDDLE FINGER: Primary | ICD-10-CM

## 2024-07-17 PROCEDURE — 97110 THERAPEUTIC EXERCISES: CPT

## 2024-07-17 PROCEDURE — 97140 MANUAL THERAPY 1/> REGIONS: CPT

## 2024-07-17 PROCEDURE — 97018 PARAFFIN BATH THERAPY: CPT

## 2024-07-18 NOTE — PROGRESS NOTES
[facility]  Occupational Therapy Treatment Note    Date: 7/17/2024  Name: Theo Matthew Jose D  Clinic Number: [id number]    Therapy Diagnosis:   Encounter Diagnoses   Name Primary?    Pain of right middle finger Yes    Stiffness of finger joint of right hand                Physician: Maryjo Chamberlain MD    Physician Orders: Zones III & IV(Central Slip/Boutonniere)   Initiate treatment 3-5 days post -op. Pt to attend therapy 3x/wk.  Fabricate 2-3 splints for immobilization and exercises per [facility] [employer] Protocol.  1 hand-based immobilization splint and additional exercise splints per structures repaired are to be fabricated (see below): If zone III, all patients will receive the Immobilization Orthosis 1: Hand based with MCP in 30° flexion volar with PIP & DIP 0- to be worn 100% except for exercise. (). Sutures removed at 10 days post op. 13d for DMII. Exercise to per formed hourly such as instructions below:     Medical Diagnosis: L03.011 (ICD-10-CM) - Cellulitis of right middle finger S66.929A (ICD-10-CM) - Laceration of tendon of finger M65.849 (ICD-10-CM) - Extensor tenosynovitis of finger        DOS: 6/11/2024  Procedure:   Right long finger extensor tendon repair zone III, cpt 42844  2.  Right long finger tenosynovectomy EDC tendons, cpt 438419  3.  Right long finger extensor tenolysis, cpt 22443  4.  Right hand irrigation and sharp excisional debridement of skin, subcutaneous tissue, tendon and bone right long finger, cpt 07922     Evaluation Date: 6/18/2024  Insurance Authorization Period Expiration: 12/31/2024  Plan of Care Certification Period: 9/10/24  Date of Return to MD:  6/24/2024   Visit # / Visits authorized: 9/ 10  FOTO Function Score  53% on 6/14/2024     Precautions:  Standard and blood and bodily fluid and Tendon repair precautions  Time In: 11:00am  Time Out: 12:00 am  Total Billable Steve: 60 min      SUBJECTIVE     Pt reports: Tightness and soreness with  AROM     Response to previous treatment:NA  Functional change: NA    Pain: 0/10  Location: right long finger      OBJECTIVE   Objective Measures updated at progress report unless specified.    Observation/Appearance:  Pt presented in custom finger gutter orthosise. Mild/Moderate edema long finger, marquez at PIP remains but visibly decreased. PIP flexion contracture resolved but end-range tightness remains.  Minimal PIP extension lag present. DIP hyperextension is now minimal.  Edema. Measured in centimeters.    6/18/2024       Right     Long:         P1 7.6      PIP suture     P2 7.2      DIP       P3       Hand ROM. Measured in degrees.    6/18/2024 7/3/2024  7/17/2024     Right RIght       Limited by post-op restrictions               Long:  MP /82              PIP   -8/ 40 degrees to limits of exercise orthosis  -5/65              DIP     /35              CHAN                   Treatment    3 wks, 6 day s/p  Theo received the treatments listed below  Patient received paraffin bath to left hand  with MHP x with  finger in extension   for 12 minutes to increase blood flow, circulation, pain management and for tissue elasticity prior to therex.  MHP x 10 min with finger in extension     Therapeutic Exercise 35 min  PROM PIP extension   Educated in self passive PIP extension on table top  DIP gentle joint blocking 2/10+ reps  -Deep friction massage to sx site  - gentle retrograde massage -educated in and added to hep.  - gentle PIP blocking   Ktape applied to dorsum of digit  - towel crunches   - isospheres  - juxacisor   Patient Education and Home Exercises      Education provided:     - Progress towards goals     Written Home Exercises Provided: NT    ASSESSMENT   Gentle AROM activities performed today as pt progresses to composite fist. Digi sleeve provided today to decrease edema.    Theo is progressing  towards his goals and there are no updates to goals at this time. Pt prognosis is Excellent.     Pt will  continue to benefit from skilled outpatient occupational therapy to address the deficits listed in the problem list on initial evaluation, provide pt/family education and to maximize pt's level of independence in the home and community environment.     Pt's spiritual, cultural and educational needs considered and pt agreeable to plan of care and goals.    Anticipated barriers to occupational therapy: Duration and nature of condiction    Goals:  Long Term Goals (LTGs); to be met by discharge.  LTG #1: Pt will report a pain level of 2 out of 10 with ADLs/IADLs          LTG #2: Pt will demo improved FOTO score by 20 points.   LTG #3: Pt will return to prior level of function for ADLs /IADLs     Short Term Goals (STGs); to be met within 6 weeks (7/30/2024)  STG #1: Pt will report a pain level of 2 out of 10 with use in light ADLs.  STG #2: Pt will demo improved FOTO score by 10 points.   STG #3: Pt will reports independence in light ADL's with use of the involved Hand/wrist/UE  STG #4: Pt will demonstrate independence with issued HEP.   STG #5: Pt will be able to touch tip to palm  STG #6: Pt will be able to extend the PIP to (-10 degrees)       PLAN     Continue skilled occupational therapy with individualized plan of care focusing on progression through protocol  Updates/Grading for next session: Progress along protocol as appropriate    Tracy Gardner OT

## 2024-07-22 ENCOUNTER — CLINICAL SUPPORT (OUTPATIENT)
Dept: REHABILITATION | Facility: HOSPITAL | Age: 54
End: 2024-07-22
Payer: COMMERCIAL

## 2024-07-22 DIAGNOSIS — M79.644 PAIN OF RIGHT MIDDLE FINGER: Primary | ICD-10-CM

## 2024-07-22 DIAGNOSIS — M25.641 STIFFNESS OF FINGER JOINT OF RIGHT HAND: ICD-10-CM

## 2024-07-22 PROCEDURE — 97530 THERAPEUTIC ACTIVITIES: CPT

## 2024-07-22 PROCEDURE — 97018 PARAFFIN BATH THERAPY: CPT

## 2024-07-22 PROCEDURE — 97110 THERAPEUTIC EXERCISES: CPT

## 2024-07-22 NOTE — PROGRESS NOTES
[facility]  Occupational Therapy Treatment Note    Date: 7/22/2024  Name: Theo Matthew Jose D  Clinic Number: [id number]    Therapy Diagnosis:   Encounter Diagnoses   Name Primary?    Pain of right middle finger Yes    Stiffness of finger joint of right hand                Physician: Maryjo Chamberlain MD    Physician Orders: Zones III & IV(Central Slip/Boutonniere)   Initiate treatment 3-5 days post -op. Pt to attend therapy 3x/wk.  Fabricate 2-3 splints for immobilization and exercises per [employer] Protocol.  1 hand-based immobilization splint and additional exercise splints per structures repaired are to be fabricated (see below): If zone III, all patients will receive the Immobilization Orthosis 1: Hand based with MCP in 30° flexion volar with PIP & DIP 0- to be worn 100% except for exercise. (). Sutures removed at 10 days post op. 13d for DMII. Exercise to per formed hourly such as instructions below:     Medical Diagnosis: L03.011 (ICD-10-CM) - Cellulitis of right middle finger S66.929A (ICD-10-CM) - Laceration of tendon of finger M65.849 (ICD-10-CM) - Extensor tenosynovitis of finger        DOS: 6/11/2024  Procedure:   Right long finger extensor tendon repair zone III, cpt 70235  2.  Right long finger tenosynovectomy EDC tendons, cpt 646882  3.  Right long finger extensor tenolysis, cpt 55004  4.  Right hand irrigation and sharp excisional debridement of skin, subcutaneous tissue, tendon and bone right long finger, cpt 88459     Evaluation Date: 6/18/2024  Insurance Authorization Period Expiration: 12/31/2024  Plan of Care Certification Period: 9/10/24  Date of Return to MD:  6/24/2024   Visit # / Visits authorized: 11/ 22  FOTO Function Score  53% on 6/14/2024     Precautions:  Standard and blood and bodily fluid and Tendon repair precautions  Time In: 12:00 pm  Time Out: 12:55 am  Total Billable Steve: 55 min      SUBJECTIVE     Pt reports: doing well. He has been doing  his newer exercises and cont to wear his orthosis.      Response to previous treatment: good, improving ROM  Functional change: still limited due to precautions. Using hand for more light tasks    Pain: 0/10  Location: right long finger      OBJECTIVE   Objective Measures updated at progress report unless specified.    Observation/Appearance:  Pt presented in custom finger gutter orthosise. Mild/Moderate edema long finger, marquez at PIP remains but visibly decreased. PIP flexion contracture resolved but end-range tightness remains.  Minimal PIP extension lag present. DIP hyperextension is now minimal.  Edema. Measured in centimeters.    6/18/2024 7/22/24     Right  R   Long:         P1 7.6  7.4    PIP suture  7.9   P2 7.2  7.1    DIP       P3       Hand ROM. Measured in degrees.    6/18/2024 7/3/2024  7/17/2024 7/22/24     Right RIght   R     Limited by post-op restrictions                 Long:  MP     /82 83              PIP   -8/ 40 degrees to limits of exercise orthosis  -5/65 -12/61              DIP     /35 22              CHAN                     Treatment     Pt is 5 weeks 6 days status post sx    Theo received the treatments listed below  Patient received paraffin bath to left hand  with MHP x with  finger in extension   for 10 minutes to increase blood flow, circulation, pain management and for tissue elasticity prior to therex.    Manual therapy x  5 minutes:  -retrograde massage and soft tissue mobilization to finger.     Therapeutic Exercise 30 min  PROM PIP extension   DIP gentle joint blocking 2/10+ reps  -Deep friction massage to sx site  - gentle retrograde massage -educated in and added to hep.  - gentle PIP blocking 2 x 10 reps  -tendon glides x 15 reps  -finger lifts x 15 reps  Ktape applied to dorsum of digit (NT)    Therapeutic activities x 10 minutes:  - towel scrunches x 3'  - isospheres x 3'  - juxacisor x 3' (NT)  -in hand manipulation with large poms x 2 sets      Patient Education and Home  Exercises      Education provided:     - Progress towards goals     Written Home Exercises Provided: NT    ASSESSMENT   Pt tolerated tx well today. He demonstrates improving edema in finger, but still has stiffness in finger. However, improved motion noted at end of treatment. Fairly good extension noted as well. Gentle AROM activities performed today again as pt progresses to composite fist.     Theo is progressing well towards his goals and there are no updates to goals at this time. Pt prognosis is Excellent.     Pt will continue to benefit from skilled outpatient occupational therapy to address the deficits listed in the problem list on initial evaluation, provide pt/family education and to maximize pt's level of independence in the home and community environment.     Pt's spiritual, cultural and educational needs considered and pt agreeable to plan of care and goals.    Anticipated barriers to occupational therapy: Duration and nature of condiction    Goals:  Long Term Goals (LTGs); to be met by discharge.  LTG #1: Pt will report a pain level of 2 out of 10 with ADLs/IADLs          LTG #2: Pt will demo improved FOTO score by 20 points.   LTG #3: Pt will return to prior level of function for ADLs /IADLs     Short Term Goals (STGs); to be met within 6 weeks (7/30/2024)  STG #1: Pt will report a pain level of 2 out of 10 with use in light ADLs.  STG #2: Pt will demo improved FOTO score by 10 points.   STG #3: Pt will reports independence in light ADL's with use of the involved Hand/wrist/UE  STG #4: Pt will demonstrate independence with issued HEP.   STG #5: Pt will be able to touch tip to palm  STG #6: Pt will be able to extend the PIP to (-10 degrees)       PLAN     Continue skilled occupational therapy with individualized plan of care focusing on progression through protocol  Updates/Grading for next session: Progress along protocol as appropriate    VLAD Fontaine, WANDAT

## 2024-07-24 ENCOUNTER — CLINICAL SUPPORT (OUTPATIENT)
Dept: REHABILITATION | Facility: HOSPITAL | Age: 54
End: 2024-07-24
Payer: COMMERCIAL

## 2024-07-24 ENCOUNTER — LAB VISIT (OUTPATIENT)
Dept: LAB | Facility: HOSPITAL | Age: 54
End: 2024-07-24
Attending: INTERNAL MEDICINE
Payer: COMMERCIAL

## 2024-07-24 DIAGNOSIS — B96.5 PSEUDOMONAS ARTHRITIS: ICD-10-CM

## 2024-07-24 DIAGNOSIS — S66.929A LACERATION OF TENDON OF FINGER: ICD-10-CM

## 2024-07-24 DIAGNOSIS — M79.644 PAIN OF RIGHT MIDDLE FINGER: Primary | ICD-10-CM

## 2024-07-24 DIAGNOSIS — M00.80 PSEUDOMONAS ARTHRITIS: ICD-10-CM

## 2024-07-24 DIAGNOSIS — M25.641 STIFFNESS OF FINGER JOINT OF RIGHT HAND: ICD-10-CM

## 2024-07-24 DIAGNOSIS — L03.011 CELLULITIS OF RIGHT MIDDLE FINGER: ICD-10-CM

## 2024-07-24 LAB
ALBUMIN SERPL BCP-MCNC: 4.2 G/DL (ref 3.5–5.2)
ALP SERPL-CCNC: 58 U/L (ref 55–135)
ALT SERPL W/O P-5'-P-CCNC: 26 U/L (ref 10–44)
ANION GAP SERPL CALC-SCNC: 12 MMOL/L (ref 8–16)
AST SERPL-CCNC: 23 U/L (ref 10–40)
BASOPHILS # BLD AUTO: 0.04 K/UL (ref 0–0.2)
BASOPHILS NFR BLD: 0.8 % (ref 0–1.9)
BILIRUB SERPL-MCNC: 0.5 MG/DL (ref 0.1–1)
BUN SERPL-MCNC: 14 MG/DL (ref 6–20)
CALCIUM SERPL-MCNC: 9.8 MG/DL (ref 8.7–10.5)
CHLORIDE SERPL-SCNC: 104 MMOL/L (ref 95–110)
CO2 SERPL-SCNC: 22 MMOL/L (ref 23–29)
CREAT SERPL-MCNC: 1 MG/DL (ref 0.5–1.4)
CRP SERPL-MCNC: 0.8 MG/L (ref 0–8.2)
DIFFERENTIAL METHOD BLD: ABNORMAL
EOSINOPHIL # BLD AUTO: 0.1 K/UL (ref 0–0.5)
EOSINOPHIL NFR BLD: 1.6 % (ref 0–8)
ERYTHROCYTE [DISTWIDTH] IN BLOOD BY AUTOMATED COUNT: 13.4 % (ref 11.5–14.5)
ERYTHROCYTE [SEDIMENTATION RATE] IN BLOOD BY PHOTOMETRIC METHOD: 35 MM/HR (ref 0–23)
EST. GFR  (NO RACE VARIABLE): >60 ML/MIN/1.73 M^2
GLUCOSE SERPL-MCNC: 98 MG/DL (ref 70–110)
HCT VFR BLD AUTO: 41.8 % (ref 40–54)
HGB BLD-MCNC: 14.1 G/DL (ref 14–18)
IMM GRANULOCYTES # BLD AUTO: 0.01 K/UL (ref 0–0.04)
IMM GRANULOCYTES NFR BLD AUTO: 0.2 % (ref 0–0.5)
LYMPHOCYTES # BLD AUTO: 2.7 K/UL (ref 1–4.8)
LYMPHOCYTES NFR BLD: 53.1 % (ref 18–48)
MCH RBC QN AUTO: 29.6 PG (ref 27–31)
MCHC RBC AUTO-ENTMCNC: 33.7 G/DL (ref 32–36)
MCV RBC AUTO: 88 FL (ref 82–98)
MONOCYTES # BLD AUTO: 0.3 K/UL (ref 0.3–1)
MONOCYTES NFR BLD: 6.6 % (ref 4–15)
NEUTROPHILS # BLD AUTO: 2 K/UL (ref 1.8–7.7)
NEUTROPHILS NFR BLD: 37.7 % (ref 38–73)
NRBC BLD-RTO: 0 /100 WBC
PLATELET # BLD AUTO: 186 K/UL (ref 150–450)
PMV BLD AUTO: 13.2 FL (ref 9.2–12.9)
POTASSIUM SERPL-SCNC: 3.8 MMOL/L (ref 3.5–5.1)
PROT SERPL-MCNC: 7.7 G/DL (ref 6–8.4)
RBC # BLD AUTO: 4.76 M/UL (ref 4.6–6.2)
SODIUM SERPL-SCNC: 138 MMOL/L (ref 136–145)
WBC # BLD AUTO: 5.16 K/UL (ref 3.9–12.7)

## 2024-07-24 PROCEDURE — 97530 THERAPEUTIC ACTIVITIES: CPT

## 2024-07-24 PROCEDURE — 97022 WHIRLPOOL THERAPY: CPT

## 2024-07-24 PROCEDURE — 85652 RBC SED RATE AUTOMATED: CPT | Performed by: INTERNAL MEDICINE

## 2024-07-24 PROCEDURE — 36415 COLL VENOUS BLD VENIPUNCTURE: CPT | Performed by: INTERNAL MEDICINE

## 2024-07-24 PROCEDURE — 85025 COMPLETE CBC W/AUTO DIFF WBC: CPT | Performed by: INTERNAL MEDICINE

## 2024-07-24 PROCEDURE — 80053 COMPREHEN METABOLIC PANEL: CPT | Performed by: INTERNAL MEDICINE

## 2024-07-24 PROCEDURE — 86140 C-REACTIVE PROTEIN: CPT | Performed by: INTERNAL MEDICINE

## 2024-07-24 PROCEDURE — 97110 THERAPEUTIC EXERCISES: CPT

## 2024-07-24 NOTE — PROGRESS NOTES
[facility]  Occupational Therapy Treatment Note    Date: 7/24/2024  Name: Theo Matthew Jose D  Clinic Number: [id number]    Therapy Diagnosis:   Encounter Diagnoses   Name Primary?    Pain of right middle finger Yes    Stiffness of finger joint of right hand        Physician: Maryjo Chamberlain MD    Physician Orders: Zones III & IV(Central Slip/Boutonniere)   Initiate treatment 3-5 days post -op. Pt to attend therapy 3x/wk.  Fabricate 2-3 splints for immobilization and exercises per [facility] [employer] Protocol.  1 hand-based immobilization splint and additional exercise splints per structures repaired are to be fabricated (see below): If zone III, all patients will receive the Immobilization Orthosis 1: Hand based with MCP in 30° flexion volar with PIP & DIP 0- to be worn 100% except for exercise. (). Sutures removed at 10 days post op. 13d for DMII. Exercise to per formed hourly such as instructions below:     Medical Diagnosis: L03.011 (ICD-10-CM) - Cellulitis of right middle finger S66.929A (ICD-10-CM) - Laceration of tendon of finger M65.849 (ICD-10-CM) - Extensor tenosynovitis of finger        DOS: 6/11/2024  Procedure:   Right long finger extensor tendon repair zone III, cpt 14771  2.  Right long finger tenosynovectomy EDC tendons, cpt 347681  3.  Right long finger extensor tenolysis, cpt 55272  4.  Right hand irrigation and sharp excisional debridement of skin, subcutaneous tissue, tendon and bone right long finger, cpt 54448     Evaluation Date: 6/18/2024  Insurance Authorization Period Expiration: 12/31/2024  Plan of Care Certification Period: 9/10/24  Date of Return to MD:  6/24/2024   Visit # / Visits authorized: 10/ 10  FOTO Function Score  53% on 6/14/2024     Precautions:  Standard and blood and bodily fluid and Tendon repair precautions  Time In: 10:30 am  Time Out: 11:30 am  Total Billable Steve: 55 min      SUBJECTIVE     Pt reports:progress with rom.     Response to  previous treatment:NA  Functional change: NA    Pain: 0/10  Location: right long finger      OBJECTIVE   Objective Measures updated at progress report unless specified.    Observation/Appearance:  Pt presented in custom finger gutter orthosise. Mild/Moderate edema long finger, marquez at PIP remains but visibly decreased. PIP flexion contracture resolved but end-range tightness remains.  Minimal PIP extension lag present. DIP hyperextension is now minimal.  Edema. Measured in centimeters.    6/18/2024 7/22/24     Right  R   Long:         P1 7.6  7.4    PIP suture  7.9   P2 7.2  7.1    DIP       P3       Hand ROM. Measured in degrees.    6/18/2024 7/3/2024  7/17/2024 7/22/24     Right RIght   R     Limited by post-op restrictions                 Long:  MP /82 83              PIP   -8/ 40 degrees to limits of exercise orthosis  -5/65 -12/61              DIP     /35 22              CHAN                     Treatment     Pt is 6 weeks 1 day status post sx    Theo received the treatments listed below  Supervised Modality: Patient received fluido therapy to left hand  for 10 minutes to increase blood flow, circulation, pain management and for tissue elasticity prior to therex.    Therapeutic Exercise 30 min  PROM PIP extension   DIP gentle joint blocking 2/10+ reps  - gentle PIP blocking 2 x 10 reps  -arom: 10+ each: wave. Hook, fist    Therapeutic activities x 15 minutes:  - towel scrunches x 3'  - isospheres x 2'  -in hand manipulation with mediumb poms x 3 min  -Octy x 3 min  Pom pom rolling for IP flex and extension x 3 min    Manual therapy:   Deep Friction Massage x 1 min  Ktape applied to dorsum of digit Not Today   - gentle retrograde massage -educated in and added to hep.Not Today   Patient Education and Home Exercises      Education provided:     - Progress towards goals     Written Home Exercises Provided: NT    ASSESSMENT   Gentle AROM activities performed today as pt progresses to composite fist. Digi sleeve  provided today to decrease edema.    Theo is progressing  towards his goals and there are no updates to goals at this time. Pt prognosis is Excellent.     Pt will continue to benefit from skilled outpatient occupational therapy to address the deficits listed in the problem list on initial evaluation, provide pt/family education and to maximize pt's level of independence in the home and community environment.     Pt's spiritual, cultural and educational needs considered and pt agreeable to plan of care and goals.    Anticipated barriers to occupational therapy: Duration and nature of condiction    Goals:  Long Term Goals (LTGs); to be met by discharge.  LTG #1: Pt will report a pain level of 2 out of 10 with ADLs/IADLs          LTG #2: Pt will demo improved FOTO score by 20 points.   LTG #3: Pt will return to prior level of function for ADLs /IADLs     Short Term Goals (STGs); to be met within 6 weeks (7/30/2024)  STG #1: Pt will report a pain level of 2 out of 10 with use in light ADLs.  STG #2: Pt will demo improved FOTO score by 10 points.   STG #3: Pt will reports independence in light ADL's with use of the involved Hand/wrist/UE  STG #4: Pt will demonstrate independence with issued HEP.   STG #5: Pt will be able to touch tip to palm  STG #6: Pt will be able to extend the PIP to (-10 degrees)       PLAN     Continue skilled occupational therapy with individualized plan of care focusing on progression through protocol  Updates/Grading for next session: Progress along protocol as appropriate    Chrissy Snowden OT

## 2024-07-25 ENCOUNTER — OFFICE VISIT (OUTPATIENT)
Dept: INFECTIOUS DISEASES | Facility: CLINIC | Age: 54
End: 2024-07-25
Payer: COMMERCIAL

## 2024-07-25 VITALS
DIASTOLIC BLOOD PRESSURE: 88 MMHG | SYSTOLIC BLOOD PRESSURE: 127 MMHG | TEMPERATURE: 99 F | BODY MASS INDEX: 29.2 KG/M2 | HEIGHT: 66 IN | WEIGHT: 181.69 LBS | HEART RATE: 91 BPM

## 2024-07-25 DIAGNOSIS — L03.011 CELLULITIS OF RIGHT MIDDLE FINGER: Primary | ICD-10-CM

## 2024-07-25 DIAGNOSIS — S66.929A LACERATION OF TENDON OF FINGER: ICD-10-CM

## 2024-07-25 DIAGNOSIS — B96.5 PSEUDOMONAS ARTHRITIS: ICD-10-CM

## 2024-07-25 DIAGNOSIS — M00.80 PSEUDOMONAS ARTHRITIS: ICD-10-CM

## 2024-07-25 PROCEDURE — 99999 PR PBB SHADOW E&M-EST. PATIENT-LVL III: CPT | Mod: PBBFAC,,, | Performed by: INTERNAL MEDICINE

## 2024-07-25 NOTE — PROGRESS NOTES
Subjective     Patient ID: Theo Matthew Jr. is a 54 y.o. male.    Chief Complaint:Infection      History of Present Illness    Patient is a 54 y.o. right hand dominant male PMH of HLD who presents today due to right long finger dorsal cellulitis caused by a laceration from a fishing knife 5.25.24. Had scaled fish and was sharpening to filet. Cut finger. Cleaned and applied neosporin.  3 days later worsened with swelling of finger and up hand to fist. Went to  and antibiotics and steroids given. Swelling improved but went for follow up and admitted.  I&D was performed by an outside hospital with serosanguineous drainage and cultures were sent.  He was transferred to [facility] on 06/03/2024 and MRI showed cellulitis and mild extensor tenosynovitis.  He received IV antibiotics while inpatient.  He was discharged on oral doxycycline and amoxicillin.       He is retired but very active with fishing, working in yard, around the house.     Tetanus vaccine in 2016.     Underwent on 6/11/24:  Post-Operative Diagnosis: Post-Op Diagnosis Codes:     * Cellulitis of right middle finger [L03.011]     * Laceration of tendon of finger [S66.929A]  Right long finger traumatic arthrotomy  Right long extensor tenosynovitis  Right long extensor tendon adhesions     Cultures from surgery tendon/joint now positive for pseudomonas. S to cipro.    Started Cipro 6/14/24.  No difficulty tolerating.   Finger continues to improve.  Participating in PT and exercises. Is moving DIP better. Not yet PIP.    Review of Systems   Constitutional: Negative for chills, decreased appetite, fever, malaise/fatigue, night sweats, weight gain and weight loss.   HENT:  Negative for congestion, ear pain, hearing loss, hoarse voice, sore throat and tinnitus.    Eyes:  Negative for blurred vision, redness and visual disturbance.   Cardiovascular:  Negative for chest pain, leg swelling and palpitations.   Respiratory:  Negative for cough, hemoptysis,  shortness of breath, sputum production and wheezing.    Hematologic/Lymphatic: Negative for adenopathy. Does not bruise/bleed easily.   Skin:  Negative for dry skin, itching, rash and suspicious lesions.   Musculoskeletal:  Negative for back pain, joint pain, myalgias and neck pain.   Gastrointestinal:  Negative for abdominal pain, constipation, diarrhea, heartburn, nausea and vomiting.   Genitourinary:  Negative for dysuria, flank pain, frequency, hematuria, hesitancy and urgency.   Neurological:  Negative for dizziness, headaches, numbness, paresthesias and weakness.   Psychiatric/Behavioral:  Negative for depression and memory loss. The patient does not have insomnia and is not nervous/anxious.         Objective   Physical Exam  Vitals and nursing note reviewed.   Constitutional:       General: He is not in acute distress.     Appearance: Normal appearance. He is well-developed. He is not toxic-appearing or diaphoretic.   HENT:      Head: Normocephalic and atraumatic. No right periorbital erythema or left periorbital erythema.      Right Ear: External ear normal.      Left Ear: External ear normal.      Nose: Nose normal.   Eyes:      General: Lids are normal. No scleral icterus.        Right eye: No discharge.         Left eye: No discharge.      Conjunctiva/sclera:      Right eye: Right conjunctiva is not injected.      Left eye: Left conjunctiva is not injected.      Pupils: Pupils are equal, round, and reactive to light.   Cardiovascular:      Rate and Rhythm: Normal rate.   Pulmonary:      Effort: Pulmonary effort is normal. No tachypnea or respiratory distress.      Breath sounds: No stridor.   Abdominal:      General: There is no distension.   Musculoskeletal:         General: Swelling present.      Cervical back: Normal range of motion. No erythema.      Comments: Right dip decreased ROM.  PIP swollen.  Mild erythema palmar surface- was wearing splint.   Skin:     General: Skin is warm and dry.       Findings: No erythema or rash.   Neurological:      Mental Status: He is alert and oriented to person, place, and time.      Cranial Nerves: No cranial nerve deficit.      Coordination: Coordination normal.   Psychiatric:         Speech: Speech normal.         Behavior: Behavior normal. Behavior is cooperative.         Thought Content: Thought content normal.         Judgment: Judgment normal.            Assessment and Plan     1. Cellulitis of right middle finger    2. Laceration of tendon of finger    3. Pseudomonas arthritis        Plan:  Theo was seen today for infection.    Diagnoses and all orders for this visit:    Cellulitis of right middle finger  -     Comprehensive Metabolic Panel; Future  -     CBC Auto Differential; Future  -     Sedimentation rate; Future  -     C-reactive protein; Future    Laceration of tendon of finger    Pseudomonas arthritis  -     Comprehensive Metabolic Panel; Future  -     CBC Auto Differential; Future  -     Sedimentation rate; Future  -     C-reactive protein; Future          Reviewed all labs and culture results.  Concern for extensive infection and will treat as osteomyelitis per Dr. Chamberlain.    Cipro 750mg po bid for 6 weeks- through 7/26.  Will touch base with ortho. Clinically from my standpoint would like to discontinue cipro.    Counseled regarding preventing injuries, cleaning wounds. Specifically wrote down betadine and hibiclens and asked him to keep with him during fishing and cleanse wound and use immediately. Also wearing puncture proof gloves.       RTC 4-6 weeks with labs prior.    Discussed with Dr. Chamberlain. OK to stop cipro tomorrow as planned. Also OK for him to fish. I left the patient a message.

## 2024-07-29 ENCOUNTER — CLINICAL SUPPORT (OUTPATIENT)
Dept: REHABILITATION | Facility: HOSPITAL | Age: 54
End: 2024-07-29
Payer: COMMERCIAL

## 2024-07-29 DIAGNOSIS — M79.644 PAIN OF RIGHT MIDDLE FINGER: Primary | ICD-10-CM

## 2024-07-29 DIAGNOSIS — M25.641 STIFFNESS OF FINGER JOINT OF RIGHT HAND: ICD-10-CM

## 2024-07-29 PROCEDURE — L3925 FO PIP DIP JNT/SPRNG PRE OTS: HCPCS

## 2024-07-29 PROCEDURE — 97530 THERAPEUTIC ACTIVITIES: CPT

## 2024-07-29 PROCEDURE — 97110 THERAPEUTIC EXERCISES: CPT

## 2024-07-29 PROCEDURE — 97022 WHIRLPOOL THERAPY: CPT

## 2024-07-29 NOTE — PROGRESS NOTES
[facility]  Occupational Therapy Treatment Note    Date: 7/29/2024  Name: Theo Matthew Jose D  Clinic Number: [id number]    Therapy Diagnosis:   Encounter Diagnoses   Name Primary?    Pain of right middle finger Yes    Stiffness of finger joint of right hand          Physician: Maryjo Chamberlain MD    Physician Orders: Zones III & IV(Central Slip/Boutonniere)   Initiate treatment 3-5 days post -op. Pt to attend therapy 3x/wk.  Fabricate 2-3 splints for immobilization and exercises per [facility] [employer] Protocol.  1 hand-based immobilization splint and additional exercise splints per structures repaired are to be fabricated (see below): If zone III, all patients will receive the Immobilization Orthosis 1: Hand based with MCP in 30° flexion volar with PIP & DIP 0- to be worn 100% except for exercise. (). Sutures removed at 10 days post op. 13d for DMII. Exercise to per formed hourly such as instructions below:     Medical Diagnosis: L03.011 (ICD-10-CM) - Cellulitis of right middle finger S66.929A (ICD-10-CM) - Laceration of tendon of finger M65.849 (ICD-10-CM) - Extensor tenosynovitis of finger        DOS: 6/11/2024  Procedure:   Right long finger extensor tendon repair zone III, cpt 80963  2.  Right long finger tenosynovectomy EDC tendons, cpt 203551  3.  Right long finger extensor tenolysis, cpt 85398  4.  Right hand irrigation and sharp excisional debridement of skin, subcutaneous tissue, tendon and bone right long finger, cpt 20841     Evaluation Date: 6/18/2024  Insurance Authorization Period Expiration: 12/31/2024  Plan of Care Certification Period: 9/10/24  Date of Return to MD:  6/24/2024   Visit # / Visits authorized: 10/ 10  FOTO Function Score  53% on 6/14/2024     Precautions:  Standard   Time In: 12:00 am  Time Out: 1:03 pm  Total Billable Steve: 55 min +       SUBJECTIVE     Pt reports:progress with rom but continued deficits.     Response to previous  treatment:NA  Functional change: NA    Pain: 0/10  Location: right long finger      OBJECTIVE   Objective Measures updated at progress report unless specified.    Observation/Appearance:  Mild/Moderate edema long finger, marquez at PIP remains but visibly decreased. Mild PIP flexion contracture has returned ut end-range tightness remain.   DIP hyperextension has resolved  Edema. Measured in centimeters.    6/18/2024 7/22/24     Right  Left   Long:         P1 7.6  7.4    PIP suture  7.9   P2 7.2  7.1    DIP       P3       Hand ROM. Measured in degrees.    6/18/2024 7/3/2024  7/17/2024 7/22/24     Right RIght   R     Limited by post-op restrictions                 Long:  MP /82 83              PIP   -8/ 40 degrees to limits of exercise orthosis  -5/65 -12/61              DIP     /35 22              CHAN                     Treatment     Pt is 6 weeks 6 daysb status post sx    Theo received the treatments listed below  Supervised Modality: Patient received fluido therapy to left hand  for 10 minutes to increase blood flow, circulation, pain management and for tissue elasticity prior to therex.    Therapeutic Exercise 30 min  PROM PIP extension   DIP gentle joint blocking 2/10+ reps  - gentle PIP blocking 2 x 10 reps  -arom: 10+ each: wave. Hook, fist    Therapeutic activities x 10  minutes:'  -in hand manipulation with mediumb poms x 3 min  -Octy x 3 min  Pom pom rolling for IP flex and extension x 3 min    Orthosis Fit and Train 5 minutes : : PIP extension orthosis was fit and issued to promote resolution of mild PIP flexion contracture. Pt was instructed in proper wear/care/precautions of orthosis. Pt was instructed to wear it for four one-hour sessions per day     Manual therapy:   Deep Friction Massage x 1 min  Ktape applied to dorsum of digit Not Today   - gentle retrograde massage -educated in and added to hep.Not Today   Patient Education and Home Exercises      Education provided:     - Progress towards  goals     Written Home Exercises Provided: NT    ASSESSMENT   Advanced to gentle passive IP flex and patient responded well. Issued LMB pre-fabricated dynamic PIP extension orthosis was fit and issued to promote resolution of mild PIP flexion contracture.     Theo is progressing  towards his goals and there are no updates to goals at this time. Pt prognosis is Excellent.     Pt will continue to benefit from skilled outpatient occupational therapy to address the deficits listed in the problem list on initial evaluation, provide pt/family education and to maximize pt's level of independence in the home and community environment.     Pt's spiritual, cultural and educational needs considered and pt agreeable to plan of care and goals.    Anticipated barriers to occupational therapy: Duration and nature of condiction    Goals:  Long Term Goals (LTGs); to be met by discharge.  LTG #1: Pt will report a pain level of 2 out of 10 with ADLs/IADLs          LTG #2: Pt will demo improved FOTO score by 20 points.   LTG #3: Pt will return to prior level of function for ADLs /IADLs     Short Term Goals (STGs); to be met within 6 weeks (7/30/2024)  STG #1: Pt will report a pain level of 2 out of 10 with use in light ADLs.  STG #2: Pt will demo improved FOTO score by 10 points.   STG #3: Pt will reports independence in light ADL's with use of the involved Hand/wrist/UE  STG #4: Pt will demonstrate independence with issued HEP.   STG #5: Pt will be able to touch tip to palm  STG #6: Pt will be able to extend the PIP to (-10 degrees)       PLAN     Continue skilled occupational therapy with individualized plan of care focusing on progression through protocol  Updates/Grading for next session: Progress along protocol as appropriate    Chrissy Snowden OT

## 2024-07-31 ENCOUNTER — CLINICAL SUPPORT (OUTPATIENT)
Dept: REHABILITATION | Facility: HOSPITAL | Age: 54
End: 2024-07-31
Payer: COMMERCIAL

## 2024-07-31 DIAGNOSIS — M25.641 STIFFNESS OF FINGER JOINT OF RIGHT HAND: ICD-10-CM

## 2024-07-31 DIAGNOSIS — M79.644 PAIN OF RIGHT MIDDLE FINGER: Primary | ICD-10-CM

## 2024-07-31 PROCEDURE — 97110 THERAPEUTIC EXERCISES: CPT

## 2024-07-31 PROCEDURE — 97530 THERAPEUTIC ACTIVITIES: CPT

## 2024-07-31 PROCEDURE — 97018 PARAFFIN BATH THERAPY: CPT

## 2024-07-31 NOTE — PROGRESS NOTES
[facility]  Occupational Therapy Treatment Note    Date: 7/31/2024  Name: Theo Matthew Jose D  Clinic Number: [id number]    Therapy Diagnosis:   Encounter Diagnoses   Name Primary?    Pain of right middle finger Yes    Stiffness of finger joint of right hand        Physician: Maryjo Chamberlain MD    Physician Orders: Zones III & IV(Central Slip/Boutonniere)   Initiate treatment 3-5 days post -op. Pt to attend therapy 3x/wk.  Fabricate 2-3 splints for immobilization and exercises per [facility] [employer] [facility] Protocol.  1 hand-based immobilization splint and additional exercise splints per structures repaired are to be fabricated (see below): If zone III, all patients will receive the Immobilization Orthosis 1: Hand based with MCP in 30° flexion volar with PIP & DIP 0- to be worn 100% except for exercise. (). Sutures removed at 10 days post op. 13d for DMII. Exercise to per formed hourly such as instructions below:     Medical Diagnosis: L03.011 (ICD-10-CM) - Cellulitis of right middle finger S66.929A (ICD-10-CM) - Laceration of tendon of finger M65.849 (ICD-10-CM) - Extensor tenosynovitis of finger        DOS: 6/11/2024  Procedure:   Right long finger extensor tendon repair zone III, cpt 21642  2.  Right long finger tenosynovectomy EDC tendons, cpt 493628  3.  Right long finger extensor tenolysis, cpt 59836  4.  Right hand irrigation and sharp excisional debridement of skin, subcutaneous tissue, tendon and bone right long finger, cpt 47271     Evaluation Date: 6/18/2024  Insurance Authorization Period Expiration: 12/31/2024  Plan of Care Certification Period: 9/10/24  Date of Return to MD:  6/24/2024   Visit # / Visits authorized: 10/ 10  FOTO Function Score  53% on 6/14/2024     Precautions:  Standard   Time In: 11:00 am  Time Out: 11:59 am  Total Billable Steve: 55 min       SUBJECTIVE     Pt reports positive response to LMB PIP dynamic extension splint.      Response to previous  treatment:NA  Functional change: NA    Pain: 0/10  Location: right long finger      OBJECTIVE   Objective Measures updated at progress report unless specified.    Observation/Appearance:  Mild/Moderate edema long finger, marquez at PIP remains but visibly decreased. Mild PIP flexion contracture has returned ut end-range tightness remain.   DIP hyperextension has resolved  Edema. Measured in centimeters.    6/18/2024 7/22/24     Right  Left   Long:         P1 7.6  7.4    PIP suture  7.9   P2 7.2  7.1    DIP       P3       Hand ROM. Measured in degrees.    6/18/2024 7/3/2024  7/17/2024 7/22/24     Right RIght   R     Limited by post-op restrictions                 Long:  MP /82 83              PIP   -8/ 40 degrees to limits of exercise orthosis  -5/65 -12/61              DIP     /35 22              CHAN                     Treatment       Theo received the treatments listed below    Supervised Modality: Patient received paraffin to left hand  for 10 minutes to increase blood flow, circulation, pain management and for tissue elasticity prior to therex.    Therapeutic Exercise 30 min  PROM PIP extension, Isolated DIP flexion, Hook, straight fist, full fist 20+ reps each  AROM: IP joint blocking  10+ reps eadh  AAROM: hooks, fists  15+ reps      Therapeutic activities x 15  minutes:'  -in hand manipulation with mediumb poms x 3 min, releasing poms from ulnar aspect of the hand  In-hand manipulation coins x 3 min  -Octy x 3 min  Pom pick-up, red clothespin, 3-jaw, x 3 min  Pom pom rolling for IP flex and extension x 3 min (Not Today)      Patient Education and Home Exercises      Education provided:     - Progress towards goals     Written Home Exercises Provided: NT    ASSESSMENT     Pt is now 7 weeks1 day status post sx. He is responding well to  LMB pre-fabricated dynamic PIP extension orthosis.  ROM notably improved as noted upon clinical observation. Greatest deficit at this time is in IP flexion.    Theo is  progressing  towards his goals and there are no updates to goals at this time. Pt prognosis is Excellent.     Pt will continue to benefit from skilled outpatient occupational therapy to address the deficits listed in the problem list on initial evaluation, provide pt/family education and to maximize pt's level of independence in the home and community environment.     Pt's spiritual, cultural and educational needs considered and pt agreeable to plan of care and goals.    Anticipated barriers to occupational therapy: Duration and nature of condiction    Goals:  Long Term Goals (LTGs); to be met by discharge.  LTG #1: Pt will report a pain level of 2 out of 10 with ADLs/IADLs          LTG #2: Pt will demo improved FOTO score by 20 points.   LTG #3: Pt will return to prior level of function for ADLs /IADLs     Short Term Goals (STGs); to be met within 6 weeks (7/30/2024)  STG #1: Pt will report a pain level of 2 out of 10 with use in light ADLs.  STG #2: Pt will demo improved FOTO score by 10 points.   STG #3: Pt will reports independence in light ADL's with use of the involved Hand/wrist/UE  (MET)  STG #4: Pt will demonstrate independence with issued HEP. (MET)  STG #5: Pt will be able to touch tip to palm   STG #6: Pt will be able to extend the PIP to (-10 degrees)       PLAN     Continue skilled occupational therapy with individualized plan of care focusing on progression through protocol  Updates/Grading for next session: Progress along protocol as appropriate    Chrissy Snowden OT

## 2024-08-05 ENCOUNTER — CLINICAL SUPPORT (OUTPATIENT)
Dept: REHABILITATION | Facility: HOSPITAL | Age: 54
End: 2024-08-05
Payer: COMMERCIAL

## 2024-08-05 DIAGNOSIS — M25.641 STIFFNESS OF FINGER JOINT OF RIGHT HAND: ICD-10-CM

## 2024-08-05 DIAGNOSIS — M79.644 PAIN OF RIGHT MIDDLE FINGER: Primary | ICD-10-CM

## 2024-08-05 PROCEDURE — 97530 THERAPEUTIC ACTIVITIES: CPT

## 2024-08-05 PROCEDURE — 97022 WHIRLPOOL THERAPY: CPT

## 2024-08-05 PROCEDURE — 97110 THERAPEUTIC EXERCISES: CPT

## 2024-08-07 ENCOUNTER — CLINICAL SUPPORT (OUTPATIENT)
Dept: REHABILITATION | Facility: HOSPITAL | Age: 54
End: 2024-08-07
Payer: COMMERCIAL

## 2024-08-07 DIAGNOSIS — M25.641 STIFFNESS OF FINGER JOINT OF RIGHT HAND: ICD-10-CM

## 2024-08-07 DIAGNOSIS — M79.644 PAIN OF RIGHT MIDDLE FINGER: Primary | ICD-10-CM

## 2024-08-07 PROCEDURE — 97022 WHIRLPOOL THERAPY: CPT

## 2024-08-07 PROCEDURE — 97530 THERAPEUTIC ACTIVITIES: CPT

## 2024-08-07 PROCEDURE — 97110 THERAPEUTIC EXERCISES: CPT

## 2024-08-13 ENCOUNTER — CLINICAL SUPPORT (OUTPATIENT)
Dept: REHABILITATION | Facility: HOSPITAL | Age: 54
End: 2024-08-13
Payer: COMMERCIAL

## 2024-08-13 DIAGNOSIS — M79.644 PAIN OF RIGHT MIDDLE FINGER: Primary | ICD-10-CM

## 2024-08-13 DIAGNOSIS — M25.641 STIFFNESS OF FINGER JOINT OF RIGHT HAND: ICD-10-CM

## 2024-08-13 PROCEDURE — 97110 THERAPEUTIC EXERCISES: CPT

## 2024-08-13 PROCEDURE — 97022 WHIRLPOOL THERAPY: CPT

## 2024-08-13 NOTE — PROGRESS NOTES
[facility]  Occupational Therapy Treatment Note    Date: 8/13/2024  Name: hTeo Matthew Jose D  Clinic Number: [id number]    Therapy Diagnosis:   Encounter Diagnoses   Name Primary?    Pain of right middle finger Yes    Stiffness of finger joint of right hand          Physician: Maryjo Chamberlain MD    Physician Orders: Zones III & IV(Central Slip/Boutonniere)   Initiate treatment 3-5 days post -op. Pt to attend therapy 3x/wk.  Fabricate 2-3 splints for immobilization and exercises per [facility] Protocol.  1 hand-based immobilization splint and additional exercise splints per structures repaired are to be fabricated (see below): If zone III, all patients will receive the Immobilization Orthosis 1: Hand based with MCP in 30° flexion volar with PIP & DIP 0- to be worn 100% except for exercise. (). Sutures removed at 10 days post op. 13d for DMII. Exercise to per formed hourly such as instructions below:     Medical Diagnosis: L03.011 (ICD-10-CM) - Cellulitis of right middle finger S66.929A (ICD-10-CM) - Laceration of tendon of finger M65.849 (ICD-10-CM) - Extensor tenosynovitis of finger        DOS: 6/11/2024  Procedure:   Right long finger extensor tendon repair zone III, cpt 61460  2.  Right long finger tenosynovectomy EDC tendons, cpt 586664  3.  Right long finger extensor tenolysis, cpt 18887  4.  Right hand irrigation and sharp excisional debridement of skin, subcutaneous tissue, tendon and bone right long finger, cpt 00081     Evaluation Date: 6/18/2024  Insurance Authorization Period Expiration: 12/31/2024  Plan of Care Certification Period: 9/10/24  Date of Return to MD:  6/24/2024   Visit # / Visits authorized: 10/ 10  FOTO Function Score  53% on 6/14/2024     Precautions:  Standard   Time In:10:30 am  Time Out: 11:40 am  Total Billable Time: 65 min       SUBJECTIVE     Pt reports positive response to LMB PIP dynamic extension splint. However, he reports that he is not  performing his HEP as frequently as prescribed.      Response to previous treatment:NA    Functional change: NA    Pain: 0/10  Location: right long finger      OBJECTIVE   Objective Measures updated at progress report unless specified.    Observation/Appearance:  Mild/Moderate edema long finger, marquez at PIP remains but visibly decreased. Mild PIP flexion contracture continues.   Edema. Measured in centimeters.    6/18/2024 7/22/24     Right  Left   Long:         P1 7.6  7.4    PIP suture  7.9   P2 7.2  7.1    DIP       P3       Hand ROM. Measured in degrees.    6/18/2024 7/3/2024  7/17/2024 7/22/24 8/5/2024 8/13/2024     Right RIght   R       Limited by post-op restrictions                     Long:  MP     /82 83 83 82              PIP   -8/ 40 degrees to limits of exercise orthosis  -5/65 -12/61 -10/69 -15/71              DIP     /35 22 24 25              CHAN                         Treatment       Theo received the treatments listed below    Supervised Modality: Patient received fluido therapy to left hand  for 10 minutes to increase blood flow, circulation, pain management and for tissue elasticity prior to therex with long finger taped in flexion for LLPS.     Therapeutic Exercise: 45 min  Reassessment of arom  Application of coban to tape long finger in flexion for LLPS during fluido  LLPS: PROM PIP extension, Isolated DIP flexion, Hook, straight fist, full fist 20+ reps each  AROM: IP joint blocking  10+ reps eadh  AAROM: hooks, fists  10+ reps  Fabricated a cinch strap to provide LLPS at home. Pt able to don and doff correctly. Pt to apply for two thirty-minute stretches per day  Encouraged compliance with performance at least 4 to 6 sessions per day.    Therapeutic activities x NOT TODAY  -in hand manipulation with mediumb poms x 3 min, releasing poms from ulnar aspect of the hand (Not Today)  Pom  long finger to palm x 3 min  In-hand manipulation coins x 3 min  -Octy x 3 min  Pom pick-up, red  clothespin, 3-jaw, x 3 min  Pom  long finger to palm followed by kicks for IP flex and extension x 3 min   putty    Patient Education and Home Exercises      Education provided:     - Progress towards goals     Written Home Exercises Provided: NT    ASSESSMENT     Reassessment shows no additional progression in rom today. Encourage compliance with HEP. Fabricated a cinch strap to provide LLPS at home.     Theo is progressing  towards his goals and there are no updates to goals at this time. Pt prognosis is Excellent.     Pt will continue to benefit from skilled outpatient occupational therapy to address the deficits listed in the problem list on initial evaluation, provide pt/family education and to maximize pt's level of independence in the home and community environment.     Pt's spiritual, cultural and educational needs considered and pt agreeable to plan of care and goals.    Anticipated barriers to occupational therapy: Duration and nature of condiction    Goals:  Long Term Goals (LTGs); to be met by discharge.  LTG #1: Pt will report a pain level of 2 out of 10 with ADLs/IADLs          LTG #2: Pt will demo improved FOTO score by 20 points.   LTG #3: Pt will return to prior level of function for ADLs /IADLs     Short Term Goals (STGs); to be met within 6 weeks (7/30/2024)  STG #1: Pt will report a pain level of 2 out of 10 with use in light ADLs.  STG #2: Pt will demo improved FOTO score by 10 points.   STG #3: Pt will reports independence in light ADL's with use of the involved Hand/wrist/UE  (MET)  STG #4: Pt will demonstrate independence with issued HEP. (MET)  STG #5: Pt will be able to touch tip to palm   STG #6: Pt will be able to extend the PIP to (-10 degrees)       PLAN     Continue skilled occupational therapy with individualized plan of care focusing on progression through protocol  Updates/Grading for next session: Progress along protocol as appropriate    Chrissy Snowden OT                                         [facility]  Occupational Therapy Treatment Note    Date: 8/13/2024  Name: Theo Matthew Jose D  Clinic Number: [id number]    Therapy Diagnosis:   Encounter Diagnoses   Name Primary?    Pain of right middle finger Yes    Stiffness of finger joint of right hand          Physician: Maryjo Chamberlain MD    Physician Orders: Zones III & IV(Central Slip/Boutonniere)   Initiate treatment 3-5 days post -op. Pt to attend therapy 3x/wk.  Fabricate 2-3 splints for immobilization and exercises per [facility] [employer] Protocol.  1 hand-based immobilization splint and additional exercise splints per structures repaired are to be fabricated (see below): If zone III, all patients will receive the Immobilization Orthosis 1: Hand based with MCP in 30° flexion volar with PIP & DIP 0- to be worn 100% except for exercise. (). Sutures removed at 10 days post op. 13d for DMII. Exercise to per formed hourly such as instructions below:     Medical Diagnosis: L03.011 (ICD-10-CM) - Cellulitis of right middle finger S66.929A (ICD-10-CM) - Laceration of tendon of finger M65.849 (ICD-10-CM) - Extensor tenosynovitis of finger        DOS: 6/11/2024  Procedure:   Right long finger extensor tendon repair zone III, cpt 97353  2.  Right long finger tenosynovectomy EDC tendons, cpt 425950  3.  Right long finger extensor tenolysis, cpt 58138  4.  Right hand irrigation and sharp excisional debridement of skin, subcutaneous tissue, tendon and bone right long finger, cpt 04849     Evaluation Date: 6/18/2024  Insurance Authorization Period Expiration: 12/31/2024  Plan of Care Certification Period: 9/10/24  Date of Return to MD:  6/24/2024   Visit # / Visits authorized: 10/ 10  FOTO Function Score  53% on 6/14/2024     Precautions:  Standard   Time In: 11:00 am  Time Out: 12:06 am  Total Billable Time: 55 min       SUBJECTIVE     Pt reports positive response to LMB PIP dynamic extension splint.       Response to previous treatment:NA  Functional change: NA    Pain: 0/10  Location: right long finger      OBJECTIVE   Objective Measures updated at progress report unless specified.    Observation/Appearance:  Mild/Moderate edema long finger, marquez at PIP remains but visibly decreased. Mild PIP flexion contracture continues.   Edema. Measured in centimeters.    6/18/2024 7/22/24     Right  Left   Long:         P1 7.6  7.4    PIP suture  7.9   P2 7.2  7.1    DIP       P3       Hand ROM. Measured in degrees.    6/18/2024 7/3/2024  7/17/2024 7/22/24 8/5/2024     Right RIght   R      Limited by post-op restrictions                   Long:  MP     /82 83 83              PIP   -8/ 40 degrees to limits of exercise orthosis  -5/65 -12/61 -10/69              DIP     /35 22 24              CHAN                       Treatment       Theo received the treatments listed below    Supervised Modality: Patient received fluido therapy to left hand  for 10 minutes to increase blood flow, circulation, pain management and for tissue elasticity prior to therex     Therapeutic Exercise: 25 min  LLPS: PROM PIP extension, Isolated DIP flexion, Hook, straight fist, full fist 20+ reps each  AROM: IP joint blocking  10+ reps eadh  AAROM: hooks, fists  20+ reps    Therapeutic activities x20  minutes:'  -in hand manipulation with mediumb poms x 3 min, releasing poms from ulnar aspect of the hand (Not Today)  Pom  long finger to palm x 3 min  In-hand manipulation coins x 3 min  -Octy x 3 min  Pom pick-up, red clothespin, 3-jaw, x 3 min  Pom  long finger to palm followed by kicks for IP flex and extension x 3 min   putty    Patient Education and Home Exercises      Education provided:     - Progress towards goals     Written Home Exercises Provided: NT    ASSESSMENT       Theo is progressing  towards his goals and there are no updates to goals at this time. Pt prognosis is Excellent.     Pt will continue to benefit from skilled  outpatient occupational therapy to address the deficits listed in the problem list on initial evaluation, provide pt/family education and to maximize pt's level of independence in the home and community environment.     Pt's spiritual, cultural and educational needs considered and pt agreeable to plan of care and goals.    Anticipated barriers to occupational therapy: Duration and nature of condiction    Goals:  Long Term Goals (LTGs); to be met by discharge.  LTG #1: Pt will report a pain level of 2 out of 10 with ADLs/IADLs          LTG #2: Pt will demo improved FOTO score by 20 points.   LTG #3: Pt will return to prior level of function for ADLs /IADLs     Short Term Goals (STGs); to be met within 6 weeks (7/30/2024)  STG #1: Pt will report a pain level of 2 out of 10 with use in light ADLs.  STG #2: Pt will demo improved FOTO score by 10 points.   STG #3: Pt will reports independence in light ADL's with use of the involved Hand/wrist/UE  (MET)  STG #4: Pt will demonstrate independence with issued HEP. (MET)  STG #5: Pt will be able to touch tip to palm   STG #6: Pt will be able to extend the PIP to (-10 degrees)       PLAN     Continue skilled occupational therapy with individualized plan of care focusing on progression through protocol  Updates/Grading for next session: Progress along protocol as appropriate    Chrissy Snowden OT

## 2024-08-15 ENCOUNTER — CLINICAL SUPPORT (OUTPATIENT)
Dept: REHABILITATION | Facility: HOSPITAL | Age: 54
End: 2024-08-15
Payer: COMMERCIAL

## 2024-08-15 DIAGNOSIS — M79.644 PAIN OF RIGHT MIDDLE FINGER: Primary | ICD-10-CM

## 2024-08-15 DIAGNOSIS — M25.641 STIFFNESS OF FINGER JOINT OF RIGHT HAND: ICD-10-CM

## 2024-08-15 PROCEDURE — 97022 WHIRLPOOL THERAPY: CPT

## 2024-08-15 PROCEDURE — 97110 THERAPEUTIC EXERCISES: CPT

## 2024-08-15 PROCEDURE — 97530 THERAPEUTIC ACTIVITIES: CPT

## 2024-08-15 NOTE — PROGRESS NOTES
[facility]  Occupational Therapy Treatment Note    Date: 8/15/2024  Name: Theo Matthew Jose D  Clinic Number: [id number]    Therapy Diagnosis:   Encounter Diagnoses   Name Primary?    Pain of right middle finger Yes    Stiffness of finger joint of right hand          Physician: Maryjo Chamberlain MD    Physician Orders: Zones III & IV(Central Slip/Boutonniere)   Initiate treatment 3-5 days post -op. Pt to attend therapy 3x/wk.  Fabricate 2-3 splints for immobilization and exercises per [facility] [employer] [facility] Protocol.  1 hand-based immobilization splint and additional exercise splints per structures repaired are to be fabricated (see below): If zone III, all patients will receive the Immobilization Orthosis 1: Hand based with MCP in 30° flexion volar with PIP & DIP 0- to be worn 100% except for exercise. (). Sutures removed at 10 days post op. 13d for DMII. Exercise to per formed hourly such as instructions below:     Medical Diagnosis: L03.011 (ICD-10-CM) - Cellulitis of right middle finger S66.929A (ICD-10-CM) - Laceration of tendon of finger M65.849 (ICD-10-CM) - Extensor tenosynovitis of finger        DOS: 6/11/2024  Procedure:   Right long finger extensor tendon repair zone III, cpt 47889  2.  Right long finger tenosynovectomy EDC tendons, cpt 748744  3.  Right long finger extensor tenolysis, cpt 88031  4.  Right hand irrigation and sharp excisional debridement of skin, subcutaneous tissue, tendon and bone right long finger, cpt 05108     Evaluation Date: 6/18/2024  Insurance Authorization Period Expiration: 12/31/2024  Plan of Care Certification Period: 9/10/24  Date of Return to MD:  6/24/2024   Visit # / Visits authorized: 10/ 10  FOTO Function Score  53% on 6/14/2024     Precautions:  Standard   Time In:1:30 pm  Time Out: 2:47 pm  Total Billable Time: 55 min ( + 22 Interrupted by false alarm in the building)       SUBJECTIVE     Pt reports that he has been performing his HEP as  frequently as prescribed sense his last session.   Response to previous treatment:NA    Functional change: NA    Pain: 0/10  Location: right long finger      OBJECTIVE   Objective Measures updated at progress report unless specified.    Observation/Appearance:  Mild/Moderate edema long finger, marquez at PIP remains but visibly decreased. Mild PIP flexion contracture continues.   Edema. Measured in centimeters.    6/18/2024 7/22/24     Right  Left   Long:         P1 7.6  7.4    PIP suture  7.9   P2 7.2  7.1    DIP       P3       Hand ROM. Measured in degrees.    6/18/2024 7/3/2024  7/17/2024 7/22/24 8/5/2024 8/13/2024 8/15/2024     Right RIght   R        Limited by post-op restrictions                       Long:  MP     /82 83 83 82 93              PIP   -8/ 40 degrees to limits of exercise orthosis  -5/65 -12/61 -10/69 -15/71 -8/80              DIP     /35 22 24 25 35              CHAN                           Treatment       Theo received the treatments listed below    Supervised Modality: Patient received fluido therapy to left hand  for 10 minutes to increase blood flow, circulation, pain management and for tissue elasticity prior to therex with long finger taped in flexion for LLPS.     Therapeutic Exercise: 30 min  Reassessment of arom  Application of coban to tape long finger in flexion for LLPS during fluido  LLPS: PROM PIP extension, Isolated DIP flexion, Hook, straight fist, full fist 20+ reps each  AROM: IP joint blocking  10+ reps eadh  AAROM: hooks, fists  10+ reps    Therapeutic activities x 15 minutes  -in hand manipulation with mediumb poms x 3 min, releasing poms from ulnar aspect of the hand (Not Today)  Pom  long finger to palm x 3 min  In-hand manipulation coins x 3 min  -Octy x 3 min (Not Today)  Pom pick-up, red clothespin, 3-jaw, x 3 min  Pom  long finger to palm followed by kicks for IP flex and extension x 3 min   putty    Patient Education and Home Exercises      Education  provided:     - Progress towards goals     Written Home Exercises Provided: NT    ASSESSMENT     Reassessment today show good progression in rom with the increase in compliance with HEP. He is continue with use cinch strap and LMB PIP extension orthosis to provide LLPS at home.     Theo is progressing  towards his goals and there are no updates to goals at this time. Pt prognosis is Excellent.     Pt will continue to benefit from skilled outpatient occupational therapy to address the deficits listed in the problem list on initial evaluation, provide pt/family education and to maximize pt's level of independence in the home and community environment.     Pt's spiritual, cultural and educational needs considered and pt agreeable to plan of care and goals.    Anticipated barriers to occupational therapy: Duration and nature of condiction    Goals:  Long Term Goals (LTGs); to be met by discharge.  LTG #1: Pt will report a pain level of 2 out of 10 with ADLs/IADLs          LTG #2: Pt will demo improved FOTO score by 20 points.   LTG #3: Pt will return to prior level of function for ADLs /IADLs     Short Term Goals (STGs); to be met within 6 weeks (7/30/2024)  STG #1: Pt will report a pain level of 2 out of 10 with use in light ADLs.  STG #2: Pt will demo improved FOTO score by 10 points.   STG #3: Pt will reports independence in light ADL's with use of the involved Hand/wrist/UE  (MET)  STG #4: Pt will demonstrate independence with issued HEP. (MET)  STG #5: Pt will be able to touch tip to palm   STG #6: Pt will be able to extend the PIP to (-10 degrees)       PLAN     Continue skilled occupational therapy with individualized plan of care focusing on progression through protocol  Updates/Grading for next session: Progress along protocol as appropriate    Chrissy Snowden, OT

## 2024-08-16 ENCOUNTER — PATIENT MESSAGE (OUTPATIENT)
Dept: ORTHOPEDICS | Facility: CLINIC | Age: 54
End: 2024-08-16
Payer: COMMERCIAL

## 2024-08-16 ENCOUNTER — LAB VISIT (OUTPATIENT)
Dept: LAB | Facility: HOSPITAL | Age: 54
End: 2024-08-16
Attending: INTERNAL MEDICINE
Payer: COMMERCIAL

## 2024-08-16 DIAGNOSIS — M00.80 PSEUDOMONAS ARTHRITIS: ICD-10-CM

## 2024-08-16 DIAGNOSIS — B96.5 PSEUDOMONAS ARTHRITIS: ICD-10-CM

## 2024-08-16 DIAGNOSIS — L03.011 CELLULITIS OF RIGHT MIDDLE FINGER: ICD-10-CM

## 2024-08-16 LAB
ALBUMIN SERPL BCP-MCNC: 4 G/DL (ref 3.5–5.2)
ALP SERPL-CCNC: 55 U/L (ref 55–135)
ALT SERPL W/O P-5'-P-CCNC: 17 U/L (ref 10–44)
ANION GAP SERPL CALC-SCNC: 9 MMOL/L (ref 8–16)
AST SERPL-CCNC: 19 U/L (ref 10–40)
BASOPHILS # BLD AUTO: 0.03 K/UL (ref 0–0.2)
BASOPHILS NFR BLD: 0.6 % (ref 0–1.9)
BILIRUB SERPL-MCNC: 0.4 MG/DL (ref 0.1–1)
BUN SERPL-MCNC: 14 MG/DL (ref 6–20)
CALCIUM SERPL-MCNC: 9.3 MG/DL (ref 8.7–10.5)
CHLORIDE SERPL-SCNC: 107 MMOL/L (ref 95–110)
CO2 SERPL-SCNC: 24 MMOL/L (ref 23–29)
CREAT SERPL-MCNC: 0.9 MG/DL (ref 0.5–1.4)
CRP SERPL-MCNC: 0.6 MG/L (ref 0–8.2)
DIFFERENTIAL METHOD BLD: ABNORMAL
EOSINOPHIL # BLD AUTO: 0.1 K/UL (ref 0–0.5)
EOSINOPHIL NFR BLD: 1.2 % (ref 0–8)
ERYTHROCYTE [DISTWIDTH] IN BLOOD BY AUTOMATED COUNT: 13.2 % (ref 11.5–14.5)
ERYTHROCYTE [SEDIMENTATION RATE] IN BLOOD BY PHOTOMETRIC METHOD: 20 MM/HR (ref 0–23)
EST. GFR  (NO RACE VARIABLE): >60 ML/MIN/1.73 M^2
GLUCOSE SERPL-MCNC: 95 MG/DL (ref 70–110)
HCT VFR BLD AUTO: 40.9 % (ref 40–54)
HGB BLD-MCNC: 13.4 G/DL (ref 14–18)
IMM GRANULOCYTES # BLD AUTO: 0.01 K/UL (ref 0–0.04)
IMM GRANULOCYTES NFR BLD AUTO: 0.2 % (ref 0–0.5)
LYMPHOCYTES # BLD AUTO: 2.9 K/UL (ref 1–4.8)
LYMPHOCYTES NFR BLD: 54.9 % (ref 18–48)
MCH RBC QN AUTO: 28.8 PG (ref 27–31)
MCHC RBC AUTO-ENTMCNC: 32.8 G/DL (ref 32–36)
MCV RBC AUTO: 88 FL (ref 82–98)
MONOCYTES # BLD AUTO: 0.4 K/UL (ref 0.3–1)
MONOCYTES NFR BLD: 7.1 % (ref 4–15)
NEUTROPHILS # BLD AUTO: 1.9 K/UL (ref 1.8–7.7)
NEUTROPHILS NFR BLD: 36 % (ref 38–73)
NRBC BLD-RTO: 0 /100 WBC
PLATELET # BLD AUTO: 199 K/UL (ref 150–450)
PMV BLD AUTO: 11.4 FL (ref 9.2–12.9)
POTASSIUM SERPL-SCNC: 4 MMOL/L (ref 3.5–5.1)
PROT SERPL-MCNC: 7.4 G/DL (ref 6–8.4)
RBC # BLD AUTO: 4.65 M/UL (ref 4.6–6.2)
SODIUM SERPL-SCNC: 140 MMOL/L (ref 136–145)
WBC # BLD AUTO: 5.21 K/UL (ref 3.9–12.7)

## 2024-08-16 PROCEDURE — 86140 C-REACTIVE PROTEIN: CPT | Performed by: INTERNAL MEDICINE

## 2024-08-16 PROCEDURE — 85652 RBC SED RATE AUTOMATED: CPT | Performed by: INTERNAL MEDICINE

## 2024-08-16 PROCEDURE — 80053 COMPREHEN METABOLIC PANEL: CPT | Performed by: INTERNAL MEDICINE

## 2024-08-16 PROCEDURE — 85025 COMPLETE CBC W/AUTO DIFF WBC: CPT | Performed by: INTERNAL MEDICINE

## 2024-08-16 PROCEDURE — 36415 COLL VENOUS BLD VENIPUNCTURE: CPT | Performed by: INTERNAL MEDICINE

## 2024-08-19 ENCOUNTER — HOSPITAL ENCOUNTER (OUTPATIENT)
Dept: RADIOLOGY | Facility: HOSPITAL | Age: 54
Discharge: HOME | End: 2024-08-19
Attending: ORTHOPAEDIC SURGERY
Payer: COMMERCIAL

## 2024-08-19 ENCOUNTER — OFFICE VISIT (OUTPATIENT)
Dept: INFECTIOUS DISEASES | Facility: CLINIC | Age: 54
End: 2024-08-19
Payer: COMMERCIAL

## 2024-08-19 ENCOUNTER — OFFICE VISIT (OUTPATIENT)
Dept: ORTHOPEDICS | Facility: CLINIC | Age: 54
End: 2024-08-19
Payer: COMMERCIAL

## 2024-08-19 VITALS
BODY MASS INDEX: 28.88 KG/M2 | TEMPERATURE: 99 F | HEIGHT: 66 IN | SYSTOLIC BLOOD PRESSURE: 124 MMHG | WEIGHT: 179.69 LBS | HEART RATE: 78 BPM | DIASTOLIC BLOOD PRESSURE: 82 MMHG

## 2024-08-19 DIAGNOSIS — M79.644 FINGER PAIN, RIGHT: ICD-10-CM

## 2024-08-19 DIAGNOSIS — L03.011 CELLULITIS OF RIGHT MIDDLE FINGER: Primary | ICD-10-CM

## 2024-08-19 DIAGNOSIS — B96.5 PSEUDOMONAS ARTHRITIS: ICD-10-CM

## 2024-08-19 DIAGNOSIS — S66.929A LACERATION OF TENDON OF FINGER: ICD-10-CM

## 2024-08-19 DIAGNOSIS — M79.644 FINGER PAIN, RIGHT: Primary | ICD-10-CM

## 2024-08-19 DIAGNOSIS — Z98.890 POST-OPERATIVE STATE: Primary | ICD-10-CM

## 2024-08-19 DIAGNOSIS — D64.9 ANEMIA, UNSPECIFIED TYPE: ICD-10-CM

## 2024-08-19 DIAGNOSIS — M00.80 PSEUDOMONAS ARTHRITIS: ICD-10-CM

## 2024-08-19 DIAGNOSIS — M65.849 EXTENSOR TENOSYNOVITIS OF FINGER: ICD-10-CM

## 2024-08-19 PROCEDURE — 3074F SYST BP LT 130 MM HG: CPT | Mod: CPTII,S$GLB,, | Performed by: INTERNAL MEDICINE

## 2024-08-19 PROCEDURE — 99999 PR PBB SHADOW E&M-EST. PATIENT-LVL III: CPT | Mod: PBBFAC,,, | Performed by: ORTHOPAEDIC SURGERY

## 2024-08-19 PROCEDURE — 3079F DIAST BP 80-89 MM HG: CPT | Mod: CPTII,S$GLB,, | Performed by: INTERNAL MEDICINE

## 2024-08-19 PROCEDURE — 73140 X-RAY EXAM OF FINGER(S): CPT | Mod: 26,RT,, | Performed by: RADIOLOGY

## 2024-08-19 PROCEDURE — 99999 PR PBB SHADOW E&M-EST. PATIENT-LVL III: CPT | Mod: PBBFAC,,, | Performed by: INTERNAL MEDICINE

## 2024-08-19 PROCEDURE — 1160F RVW MEDS BY RX/DR IN RCRD: CPT | Mod: CPTII,S$GLB,, | Performed by: INTERNAL MEDICINE

## 2024-08-19 PROCEDURE — 99213 OFFICE O/P EST LOW 20 MIN: CPT | Mod: S$GLB,,, | Performed by: INTERNAL MEDICINE

## 2024-08-19 PROCEDURE — 73140 X-RAY EXAM OF FINGER(S): CPT | Mod: TC,RT

## 2024-08-19 PROCEDURE — 3044F HG A1C LEVEL LT 7.0%: CPT | Mod: CPTII,S$GLB,, | Performed by: INTERNAL MEDICINE

## 2024-08-19 PROCEDURE — 3008F BODY MASS INDEX DOCD: CPT | Mod: CPTII,S$GLB,, | Performed by: INTERNAL MEDICINE

## 2024-08-19 PROCEDURE — 1159F MED LIST DOCD IN RCRD: CPT | Mod: CPTII,S$GLB,, | Performed by: INTERNAL MEDICINE

## 2024-08-19 NOTE — PROGRESS NOTES
Subjective     Patient ID: Theo Matthew Jr. is a 54 y.o. male.    Chief Complaint:Follow-up      History of Present Illness    Patient is a 54 y.o. right hand dominant male PMH of HLD who presents today due to right long finger dorsal cellulitis caused by a laceration from a fishing knife 5.25.24. Had scaled fish and was sharpening to filet. Cut finger. Cleaned and applied neosporin.  3 days later worsened with swelling of finger and up hand to fist. Went to  and antibiotics and steroids given. Swelling improved but went for follow up and admitted.  I&D was performed by an outside hospital with serosanguineous drainage and cultures were sent.  He was transferred to [facility] on 06/03/2024 and MRI showed cellulitis and mild extensor tenosynovitis.  He received IV antibiotics while inpatient.  He was discharged on oral doxycycline and amoxicillin.       He is retired but very active with fishing, working in yard, around the house.     Tetanus vaccine in 2016.     Underwent on 6/11/24:  Post-Operative Diagnosis: Post-Op Diagnosis Codes:     * Cellulitis of right middle finger [L03.011]     * Laceration of tendon of finger [S66.929A]  Right long finger traumatic arthrotomy  Right long extensor tenosynovitis  Right long extensor tendon adhesions     Cultures from surgery tendon/joint now positive for pseudomonas. S to cipro.    Started Cipro 6/14/24. Completed 6 weeks 7/26/24.  No difficulty tolerating.   Finger continues to improve. Can see wrinkles from decreased swelling.  Participating in PT and exercises. Is moving DIP better. Not yet PIP.  Has follow up with Dr. Chamberlain today.    Has not had anemia before.  Like liver but not eaten recently. Does not eat dark leafy b\vegetables often. Not taking multivit.    Review of Systems   Constitutional: Negative for chills, decreased appetite, fever, malaise/fatigue, night sweats, weight gain and weight loss.   HENT:  Negative for congestion, ear pain, hearing  loss, hoarse voice, sore throat and tinnitus.    Eyes:  Negative for blurred vision, redness and visual disturbance.   Cardiovascular:  Negative for chest pain, leg swelling and palpitations.   Respiratory:  Negative for cough, hemoptysis, shortness of breath, sputum production and wheezing.    Hematologic/Lymphatic: Negative for adenopathy. Does not bruise/bleed easily.   Skin:  Negative for dry skin, itching, rash and suspicious lesions.   Musculoskeletal:  Negative for back pain, joint pain, myalgias and neck pain.   Gastrointestinal:  Negative for abdominal pain, constipation, diarrhea, heartburn, nausea and vomiting.   Genitourinary:  Negative for dysuria, flank pain, frequency, hematuria, hesitancy and urgency.   Neurological:  Negative for dizziness, headaches, numbness, paresthesias and weakness.   Psychiatric/Behavioral:  Negative for depression and memory loss. The patient does not have insomnia and is not nervous/anxious.         Objective   Physical Exam  Vitals and nursing note reviewed.   Constitutional:       General: He is not in acute distress.     Appearance: Normal appearance. He is well-developed. He is not toxic-appearing or diaphoretic.   HENT:      Head: Normocephalic and atraumatic. No right periorbital erythema or left periorbital erythema.      Right Ear: External ear normal.      Left Ear: External ear normal.      Nose: Nose normal.   Eyes:      General: Lids are normal. No scleral icterus.        Right eye: No discharge.         Left eye: No discharge.      Conjunctiva/sclera:      Right eye: Right conjunctiva is not injected.      Left eye: Left conjunctiva is not injected.      Pupils: Pupils are equal, round, and reactive to light.   Cardiovascular:      Rate and Rhythm: Normal rate.   Pulmonary:      Effort: Pulmonary effort is normal. No tachypnea or respiratory distress.      Breath sounds: No stridor.   Abdominal:      General: There is no distension.   Musculoskeletal:          General: Swelling present.      Cervical back: Normal range of motion. No erythema.      Comments: Right dip decreased ROM.  PIP mildly swollen.  Mild erythema palmar surface-near knuckle   Skin:     General: Skin is warm and dry.      Findings: No erythema or rash.   Neurological:      Mental Status: He is alert and oriented to person, place, and time.      Cranial Nerves: No cranial nerve deficit.      Coordination: Coordination normal.   Psychiatric:         Speech: Speech normal.         Behavior: Behavior normal. Behavior is cooperative.         Thought Content: Thought content normal.         Judgment: Judgment normal.            Assessment and Plan     1. Cellulitis of right middle finger    2. Laceration of tendon of finger    3. Pseudomonas arthritis    4. Anemia, unspecified type        Plan:  Theo was seen today for follow-up.    Diagnoses and all orders for this visit:    Cellulitis of right middle finger  -     Comprehensive Metabolic Panel; Future  -     C-reactive protein; Future  -     Iron; Future  -     TSH; Future  -     Vitamin B12; Future  -     Folate; Future  -     CBC Auto Differential; Future  -     Sedimentation rate; Future    Laceration of tendon of finger    Pseudomonas arthritis    Anemia, unspecified type  -     Comprehensive Metabolic Panel; Future  -     C-reactive protein; Future  -     Iron; Future  -     TSH; Future  -     Vitamin B12; Future  -     Folate; Future  -     CBC Auto Differential; Future  -     Sedimentation rate; Future          Reviewed all labs and culture results.  Concern for extensive infection and will treated as osteomyelitis per Dr. Chamberlain.    Cipro 750mg po bid for 6 weeks- completed 7/26/24.  .  Finger clinically improving. Still decreased ROM.  Mildly swollen.  Labs reviewed with patient.  Mild anemia- discussed diet.  Add multi vit q day.  Will follow.       RTC 4-6 weeks with labs prior.

## 2024-08-19 NOTE — PROGRESS NOTES
Theo Matthew Jr. presents for post-operative evaluation of   Encounter Diagnoses   Name Primary?    Post-operative state Yes    Extensor tenosynovitis of finger    .   History of Present Illness     The patient is now 9 weeks 6 days s/p 6.11.24 Right long finger I&D, extensor tendon + lateral band + central slip repair, extensor tenosynovectomy; intra-op cx + pseudomonas aeruginosa.  Overall the patient reports doing well.  The patient reports appropriate postoperative soreness with well controlled overall pain.      ID: 6.14.24 - oral Cipro for 6 weeks.  Labs: 8/16/24  WBC 5.21, ESR 20 (down from 35), CRP 0.6 (5.6)      Vitals:    08/19/24 1504   PainSc:   1   PainLoc: Finger       PE:    AA&O x 4.  NAD  HEENT:  NCAT, sclera nonicteric  Lungs:  Respirations are equal and unlabored.  CV:  2+ bilateral upper and lower extremity pulses.  MSK: The incision is well healed.  Right long finger PIP joint ROM improving. Neurovascularly intact and has 5/5 thenar and intrinsic musculature strength.    Physical Exam         Diagnostic studies and other clinical records review:  Results       X-rays AP, lateral and oblique right longer finger taken today are independently reviewed by me and shows no evidence of osteomyelitis.    Assessment & Plan: Status post above, doing well     Assessment & Plan       Continue with range of motion, passive stretch and strengthening in OT  F/U 6 weeks with new xrays right long finger  Call with any questions/concerns in the interim        Maryjo Chamberlain MD    Please be aware that this note has been generated with the assistance of "[employer] voice-to-text.  Please excuse any spelling or grammatical errors.    This note was generated with the assistance of ambient listening technology. Verbal consent was obtained by the patient and accompanying visitor(s) for the recording of patient appointment to facilitate this note. I attest to having reviewed and edited the generated note for accuracy,  though some syntax or spelling errors may persist. Please contact the author of this note for any clarification.

## 2024-08-20 ENCOUNTER — CLINICAL SUPPORT (OUTPATIENT)
Dept: REHABILITATION | Facility: HOSPITAL | Age: 54
End: 2024-08-20
Payer: COMMERCIAL

## 2024-08-20 DIAGNOSIS — M25.641 STIFFNESS OF FINGER JOINT OF RIGHT HAND: ICD-10-CM

## 2024-08-20 DIAGNOSIS — M79.644 PAIN OF RIGHT MIDDLE FINGER: Primary | ICD-10-CM

## 2024-08-20 PROCEDURE — 97018 PARAFFIN BATH THERAPY: CPT

## 2024-08-20 PROCEDURE — 97530 THERAPEUTIC ACTIVITIES: CPT

## 2024-08-20 PROCEDURE — 97110 THERAPEUTIC EXERCISES: CPT

## 2024-08-20 NOTE — PROGRESS NOTES
[facility]  Occupational Therapy Treatment Note    Date: 8/20/2024  Name: Theo Matthew Jose D  Clinic Number: [id number]    Therapy Diagnosis:   Encounter Diagnoses   Name Primary?    Pain of right middle finger Yes    Stiffness of finger joint of right hand            Physician: Maryjo Chamberlain MD    Physician Orders: Zones III & IV(Central Slip/Boutonniere)   Initiate treatment 3-5 days post -op. Pt to attend therapy 3x/wk.  Fabricate 2-3 splints for immobilization and exercises per [facility] [employer] Protocol.  1 hand-based immobilization splint and additional exercise splints per structures repaired are to be fabricated (see below): If zone III, all patients will receive the Immobilization Orthosis 1: Hand based with MCP in 30° flexion volar with PIP & DIP 0- to be worn 100% except for exercise. (). Sutures removed at 10 days post op. 13d for DMII. Exercise to per formed hourly such as instructions below:     Medical Diagnosis: L03.011 (ICD-10-CM) - Cellulitis of right middle finger S66.929A (ICD-10-CM) - Laceration of tendon of finger M65.849 (ICD-10-CM) - Extensor tenosynovitis of finger        DOS: 6/11/2024  Procedure:   Right long finger extensor tendon repair zone III, cpt 24994  2.  Right long finger tenosynovectomy EDC tendons, cpt 493808  3.  Right long finger extensor tenolysis, cpt 31273  4.  Right hand irrigation and sharp excisional debridement of skin, subcutaneous tissue, tendon and bone right long finger, cpt 44786     Evaluation Date: 6/18/2024  Insurance Authorization Period Expiration: 12/31/2024  Plan of Care Certification Period: 9/10/24  Date of Return to MD:  6/24/2024   Visit # / Visits authorized: 10/ 10  FOTO Function Score  53% on 6/14/2024     Precautions:  Standard   Time In:10:30 am  Time Out: 11:30 pm  Total Billable Time: 55 min       SUBJECTIVE     Pt reports that he has been performing his HEP as frequently as prescribed sense his last session.    Response to previous treatment:NA    Functional change: NA    Pain: 0/10  Location: right long finger      OBJECTIVE   Objective Measures updated at progress report unless specified.    Observation/Appearance:  Mild/Moderate edema long finger, marquez at PIP remains but visibly decreased. Mild PIP flexion contracture continues.   Edema. Measured in centimeters.    6/18/2024 7/22/24     Right  Left   Long:         P1 7.6  7.4    PIP suture  7.9   P2 7.2  7.1    DIP       P3       Hand ROM. Measured in degrees.    6/18/2024 7/3/2024  7/17/2024 7/22/24 8/5/2024 8/13/2024 8/15/2024     Right RIght   R        Limited by post-op restrictions                       Long:  MP     /82 83 83 82 93              PIP   -8/ 40 degrees to limits of exercise orthosis  -5/65 -12/61 -10/69 -15/71 -8/80              DIP     /35 22 24 25 35              CHAN                              8/20/2024 8/20/2024     Left   Right        113, 103, 108  Avg =108 48, 50, 65  Avg = 54    Lat pinch       3pt pinch  17 16.5    2 pt pinch                 Treatment     Theo received the treatments listed below    Supervised Modality: Patient received paraffin therapy to left hand  for 10 minutes to increase blood flow, circulation, pain management and for tissue elasticity prior to therex    Therapeutic Exercise: 30 min  Reassessment of arom  LLPS: PROM PIP extension, Isolated DIP flexion, Hook, straight fist, full fist 20+ reps each  AROM: IP joint blocking  10+ reps eadh  AAROM: hooks, Straight fists, full fists  10+ reps    Therapeutic activities x 15 minutes    BTE Simulator II                   Tool Plane of motion and time                   504   Wrist flex, wrist ext 45 sec ea   302  Rad dev, ulnar dev 45 sec   162  x 60 sec   162 lat pinch,3-jaw pinch 45 sec ea      (None of below performed today)  -in hand manipulation with mediumb poms x 3 min, releasing poms from ulnar aspect of the hand (Not Today)  Pom  long finger to palm x  3 min  In-hand manipulation coins x 3 min  -Octy x 3 min (Not Today)  Pom pick-up, red clothespin, 3-jaw, x 3 min  Pom  long finger to palm followed by kicks for IP flex and extension x 3 min   putty    Patient Education and Home Exercises      Education provided:     - Progress towards goals     Written Home Exercises Provided: NT    ASSESSMENT   Compliance with HEP slipped again. Pt now encouraged to perform it 6x/day.  He is continue with use cinch strap and LMB PIP extension orthosis to provide LLPS at home. Assessment of strength reveals weakness of . Added BTE today to further address this.     Theo is progressing  towards his goals and there are no updates to goals at this time. Pt prognosis is Excellent.     Pt will continue to benefit from skilled outpatient occupational therapy to address the deficits listed in the problem list on initial evaluation, provide pt/family education and to maximize pt's level of independence in the home and community environment.     Pt's spiritual, cultural and educational needs considered and pt agreeable to plan of care and goals.    Anticipated barriers to occupational therapy: Duration and nature of condiction    Goals:  Long Term Goals (LTGs); to be met by discharge.  LTG #1: Pt will report a pain level of 2 out of 10 with ADLs/IADLs  (MET)        LTG #2: Pt will demo improved FOTO score by 20 points.   LTG #3: Pt will return to prior level of function for ADLs /IADLs     Short Term Goals (STGs); to be met within 6 weeks (7/30/2024)  STG #1: Pt will report a pain level of 2 out of 10 with use in light ADLs. (MET)  STG #2: Pt will demo improved FOTO score by 10 points.   STG #3: Pt will reports independence in light ADL's with use of the involved Hand/wrist/UE  (MET)  STG #4: Pt will demonstrate independence with issued HEP. (MET)  STG #5: Pt will be able to touch tip to palm   STG #6: Pt will be able to extend the PIP to (-10 degrees) (MET)       PLAN      Continue skilled occupational therapy with individualized plan of care focusing on rom and strengthening.   Updates/Grading for next session: Progress as tolerated.   Chrissy Snowden OT

## 2024-08-22 ENCOUNTER — CLINICAL SUPPORT (OUTPATIENT)
Dept: REHABILITATION | Facility: HOSPITAL | Age: 54
End: 2024-08-22
Payer: COMMERCIAL

## 2024-08-22 DIAGNOSIS — M25.641 STIFFNESS OF FINGER JOINT OF RIGHT HAND: ICD-10-CM

## 2024-08-22 DIAGNOSIS — M79.644 PAIN OF RIGHT MIDDLE FINGER: Primary | ICD-10-CM

## 2024-08-22 PROCEDURE — 97530 THERAPEUTIC ACTIVITIES: CPT

## 2024-08-22 PROCEDURE — 97022 WHIRLPOOL THERAPY: CPT

## 2024-08-22 PROCEDURE — 97110 THERAPEUTIC EXERCISES: CPT

## 2024-08-22 NOTE — PROGRESS NOTES
[facility]  Occupational Therapy Treatment Note    Date: 8/22/2024  Name: Theo Matthew Jose D  Clinic Number: [id number]    Therapy Diagnosis:   Encounter Diagnoses   Name Primary?    Pain of right middle finger Yes    Stiffness of finger joint of right hand        Physician: Maryjo Chamberlain MD    Physician Orders: Zones III & IV(Central Slip/Boutonniere)   Initiate treatment 3-5 days post -op. Pt to attend therapy 3x/wk.  Fabricate 2-3 splints for immobilization and exercises per [facility] [employer] Protocol.  1 hand-based immobilization splint and additional exercise splints per structures repaired are to be fabricated (see below): If zone III, all patients will receive the Immobilization Orthosis 1: Hand based with MCP in 30° flexion volar with PIP & DIP 0- to be worn 100% except for exercise. (). Sutures removed at 10 days post op. 13d for DMII. Exercise to per formed hourly such as instructions below:     Medical Diagnosis: L03.011 (ICD-10-CM) - Cellulitis of right middle finger S66.929A (ICD-10-CM) - Laceration of tendon of finger M65.849 (ICD-10-CM) - Extensor tenosynovitis of finger      DOS: 6/11/2024  Procedure:   Right long finger extensor tendon repair zone III, cpt 12611  2.  Right long finger tenosynovectomy EDC tendons, cpt 735357  3.  Right long finger extensor tenolysis, cpt 08198  4.  Right hand irrigation and sharp excisional debridement of skin, subcutaneous tissue, tendon and bone right long finger, cpt 87069     Evaluation Date: 6/18/2024  Insurance Authorization Period Expiration: 12/31/2024  Plan of Care Certification Period: 9/10/24  Date of Return to MD:  6/24/2024   Visit # / Visits authorized: 10/ 10  FOTO Function Score [id number]   53% on 6/14/2024     Precautions:  Standard   Time In:11:00 am  Time Out: .12:08 pm  Total Billable Time: 55 min       SUBJECTIVE     Pt reports compliance with HEP and notes improvement in rom.  Response to previous  treatment:NA    Functional change: NA    Pain: 0/10  Location: right long finger      OBJECTIVE   Objective Measures updated at progress report unless specified.    Observation/Appearance:  Mild edema long finger, marquez at PIP remains but visibly decreased. Mild PIP flexion contracture continues.     Edema. Measured in centimeters.    6/18/2024 7/22/24     Right  Left   Long:         P1 7.6  7.4    PIP suture  7.9   P2 7.2  7.1    DIP       P3       Hand ROM. Measured in degrees.    6/18/2024 7/3/2024  7/17/2024 7/22/24 8/5/2024 8/13/2024 8/15/2024     Right RIght   R        Limited by post-op restrictions                       Long:  MP     /82 83 83 82 93              PIP   -8/ 40 degrees to limits of exercise orthosis  -5/65 -12/61 -10/69 -15/71 -8/80              DIP     /35 22 24 25 35              CHAN                              8/20/2024 8/20/2024     Left   Right        113, 103, 108  Avg =108 48, 50, 65  Avg = 54    Lat pinch       3pt pinch  17 16.5    2 pt pinch                 Treatment     Theo received the treatments listed below    Supervised Modality: Patient received paraffin therapy to left hand  with fingers taped in flexion for 10 minutes to increase blood flow, circulation, pain management and for tissue elasticity prior to therex    Therapeutic Exercise: 25 min  Application of coban to stretch fingers into flexion during paraffin.   LLPS: PROM PIP extension, Isolated DIP flexion, Hook, straight fist, full fist 20+ reps each  AROM: IP joint blocking  10+ reps eadh  AAROM: hooks, Straight fists, full fists  10+ reps    Therapeutic activities x 20 minutes  BTE Simulator II                   Tool Plane of motion and time                   504   Wrist flex, wrist ext 45 sec ea   302  Rad dev, ulnar dev 45 sec   162  x 60 sec   162 lat pinch,3-jaw pinch 45 sec ea      Pom  long finger to palm x 3 min  Pom pick-up, green clothespin, 3-jaw, x 3 min    Patient Education and Home  Exercises      Education provided:     - Progress towards goals     Written Home Exercises Provided: NT    ASSESSMENT   Theo reports compliance with hep of rom and putty exercises. He is continue with use cinch strap and LMB PIP extension orthosis to provide LLPS at home. He participates well in therapy and was nearly able to touch tip of long finger to palm today,    Theo is progressing  towards his gals and there are no updates to goals at this time. Pt prognosis is Excellent.     Pt will continue to benefit from skilled outpatient occupational therapy to address the deficits listed in the problem list on initial evaluation, provide pt/family education and to maximize pt's level of independence in the home and community environment.     Pt's spiritual, cultural and educational needs considered and pt agreeable to plan of care and goals.    Anticipated barriers to occupational therapy: Duration and nature of condiction    Goals:  Long Term Goals (LTGs); to be met by discharge.  LTG #1: Pt will report a pain level of 2 out of 10 with ADLs/IADLs  (MET)        LTG #2: Pt will demo improved FOTO score by 20 points.   LTG #3: Pt will return to prior level of function for ADLs /IADLs     Short Term Goals (STGs); to be met within 6 weeks (7/30/2024)  STG #1: Pt will report a pain level of 2 out of 10 with use in light ADLs. (MET)  STG #2: Pt will demo improved FOTO score by 10 points.   STG #3: Pt will reports independence in light ADL's with use of the involved Hand/wrist/UE  (MET)  STG #4: Pt will demonstrate independence with issued HEP. (MET)  STG #5: Pt will be able to touch tip to palm   STG #6: Pt will be able to extend the PIP to (-10 degrees) (MET)       PLAN     Continue skilled occupational therapy with individualized plan of care focusing on rom and strengthening.   Updates/Grading for next session: Progress as tolerated.   Chrissy Snowden, OT

## 2024-08-27 ENCOUNTER — CLINICAL SUPPORT (OUTPATIENT)
Dept: REHABILITATION | Facility: HOSPITAL | Age: 54
End: 2024-08-27
Payer: COMMERCIAL

## 2024-08-27 DIAGNOSIS — M79.644 PAIN OF RIGHT MIDDLE FINGER: Primary | ICD-10-CM

## 2024-08-27 DIAGNOSIS — M25.641 STIFFNESS OF FINGER JOINT OF RIGHT HAND: ICD-10-CM

## 2024-08-27 PROCEDURE — 97530 THERAPEUTIC ACTIVITIES: CPT

## 2024-08-27 PROCEDURE — 97110 THERAPEUTIC EXERCISES: CPT

## 2024-08-27 PROCEDURE — 97022 WHIRLPOOL THERAPY: CPT

## 2024-08-27 NOTE — PROGRESS NOTES
[facility]  Occupational Therapy Treatment Note    Date: 8/27/2024  Name: Theo Matthew Jose D  Clinic Number: [id number]    Therapy Diagnosis:   Encounter Diagnoses   Name Primary?    Pain of right middle finger Yes    Stiffness of finger joint of right hand          Physician: Maryjo Chamberlain MD    Physician Orders: Zones III & IV(Central Slip/Boutonniere)   Initiate treatment 3-5 days post -op. Pt to attend therapy 3x/wk.  Fabricate 2-3 splints for immobilization and exercises per [facility] [employer] Protocol.  1 hand-based immobilization splint and additional exercise splints per structures repaired are to be fabricated (see below): If zone III, all patients will receive the Immobilization Orthosis 1: Hand based with MCP in 30° flexion volar with PIP & DIP 0- to be worn 100% except for exercise. (). Sutures removed at 10 days post op. 13d for DMII. Exercise to per formed hourly such as instructions below:     Medical Diagnosis: L03.011 (ICD-10-CM) - Cellulitis of right middle finger S66.929A (ICD-10-CM) - Laceration of tendon of finger M65.849 (ICD-10-CM) - Extensor tenosynovitis of finger      DOS: 6/11/2024  Procedure:   Right long finger extensor tendon repair zone III, cpt 58910  2.  Right long finger tenosynovectomy EDC tendons, cpt 228270  3.  Right long finger extensor tenolysis, cpt 08239  4.  Right hand irrigation and sharp excisional debridement of skin, subcutaneous tissue, tendon and bone right long finger, cpt 71927     Evaluation Date: 6/18/2024  Insurance Authorization Period Expiration: 12/31/2024  Plan of Care Certification Period: 9/10/24  Date of Return to MD:  6/24/2024   Visit # / Visits authorized: 10/ 10  FOTO Function Score [id number]   53% on 6/14/2024     Precautions:  Standard   Time In:11:15 am (Pt late)  Time Out: .11:57 pm  Total Billable Time: 43 min       SUBJECTIVE     Pt reports compliance with HEP and notes improvement in rom.  Response to previous  treatment: Nothing new reported    Functional change: Nothing new reported      Pain: 0/10  Location: right long finger      OBJECTIVE   Objective Measures updated at progress report unless specified.    Observation/Appearance:  Mild edema long finger, marquez at PIP remains but visibly decreased. Mild PIP flexion contracture continues.     Edema. Measured in centimeters.    6/18/2024 7/22/24     Right  Left   Long:         P1 7.6  7.4    PIP suture  7.9   P2 7.2  7.1    DIP       P3       Hand ROM. Measured in degrees.    6/18/2024 7/3/2024  7/17/2024 7/22/24 8/5/2024 8/13/2024 8/15/2024     Right RIght   R        Limited by post-op restrictions                       Long:  MP     /82 83 83 82 93              PIP   -8/ 40 degrees to limits of exercise orthosis  -5/65 -12/61 -10/69 -15/71 -8/80              DIP     /35 22 24 25 35              CHAN                              8/20/2024 8/20/2024     Left   Right        113, 103, 108  Avg =108 48, 50, 65  Avg = 54    Lat pinch       3pt pinch  17 16.5    2 pt pinch                 Treatment     Theo received the treatments listed below    Supervised Modality: Patient received fluido therapy to left hand  with fingers taped in flexion for 10 minutes to increase blood flow, circulation, pain management and for tissue elasticity prior to therex    Therapeutic Exercise: 21 min  Application of coban to stretch fingers into flexion during paraffin. (Not Today)  LLPS: PROM PIP extension, Isolated DIP flexion, Hook, straight fist, full fist 20+ reps each  AROM: IP joint blocking  10+ reps eadh  AAROM: hooks, Straight fists, full fists  10+ reps    Therapeutic activities x 13 minutes  BTE Simulator II                   Tool Plane of motion and time                   504   Wrist flex, wrist ext 45 sec ea   302  Rad dev, ulnar dev 45 sec   162  x 60 sec   162 lat pinch,3-jaw pinch 45 sec ea      Pom  long finger to palm x 3 min (Not Today)  Pom pick-up, green  clothespin, 3-jaw, x 3 min (Not Today)    Patient Education and Home Exercises      Education provided:     - Progress towards goals     Written Home Exercises Provided: NT    ASSESSMENT     Theo is progressing  towards his gals and there are no updates to goals at this time. Pt prognosis is Excellent.     Pt will continue to benefit from skilled outpatient occupational therapy to address the deficits listed in the problem list on initial evaluation, provide pt/family education and to maximize pt's level of independence in the home and community environment.     Pt's spiritual, cultural and educational needs considered and pt agreeable to plan of care and goals.    Anticipated barriers to occupational therapy: Duration and nature of condiction    Goals:  Long Term Goals (LTGs); to be met by discharge.  LTG #1: Pt will report a pain level of 2 out of 10 with ADLs/IADLs  (MET)        LTG #2: Pt will demo improved FOTO score by 20 points.   LTG #3: Pt will return to prior level of function for ADLs /IADLs     Short Term Goals (STGs); to be met within 6 weeks (7/30/2024)  STG #1: Pt will report a pain level of 2 out of 10 with use in light ADLs. (MET)  STG #2: Pt will demo improved FOTO score by 10 points.   STG #3: Pt will reports independence in light ADL's with use of the involved Hand/wrist/UE  (MET)  STG #4: Pt will demonstrate independence with issued HEP. (MET)  STG #5: Pt will be able to touch tip to palm   STG #6: Pt will be able to extend the PIP to (-10 degrees) (MET)       PLAN     Continue skilled occupational therapy with individualized plan of care focusing on rom and strengthening.   Updates/Grading for next session: Progress as tolerated.   Chrissy Snowden, OT

## 2024-08-29 ENCOUNTER — CLINICAL SUPPORT (OUTPATIENT)
Dept: REHABILITATION | Facility: HOSPITAL | Age: 54
End: 2024-08-29
Payer: COMMERCIAL

## 2024-08-29 DIAGNOSIS — M25.641 STIFFNESS OF FINGER JOINT OF RIGHT HAND: ICD-10-CM

## 2024-08-29 DIAGNOSIS — M79.644 PAIN OF RIGHT MIDDLE FINGER: Primary | ICD-10-CM

## 2024-08-29 PROCEDURE — 97022 WHIRLPOOL THERAPY: CPT | Mod: CO

## 2024-08-29 PROCEDURE — 97530 THERAPEUTIC ACTIVITIES: CPT | Mod: CO

## 2024-08-29 PROCEDURE — 97110 THERAPEUTIC EXERCISES: CPT | Mod: CO

## 2024-08-29 NOTE — PROGRESS NOTES
[facility]  Occupational Therapy Treatment Note    Date: 8/29/2024  Name: Theo Matthew Jose D  Clinic Number: [id number]    Therapy Diagnosis:   Encounter Diagnoses   Name Primary?    Pain of right middle finger Yes    Stiffness of finger joint of right hand            Physician: Maryjo Chamberlain MD    Physician Orders: Zones III & IV(Central Slip/Boutonniere)   Initiate treatment 3-5 days post -op. Pt to attend therapy 3x/wk.  Fabricate 2-3 splints for immobilization and exercises per [facility] [employer] Protocol.  1 hand-based immobilization splint and additional exercise splints per structures repaired are to be fabricated (see below): If zone III, all patients will receive the Immobilization Orthosis 1: Hand based with MCP in 30° flexion volar with PIP & DIP 0- to be worn 100% except for exercise. (). Sutures removed at 10 days post op. 13d for DMII. Exercise to per formed hourly such as instructions below:     Medical Diagnosis: L03.011 (ICD-10-CM) - Cellulitis of right middle finger S66.929A (ICD-10-CM) - Laceration of tendon of finger M65.849 (ICD-10-CM) - Extensor tenosynovitis of finger      DOS: 6/11/2024  Procedure:   Right long finger extensor tendon repair zone III, cpt 63186  2.  Right long finger tenosynovectomy EDC tendons, cpt 566968  3.  Right long finger extensor tenolysis, cpt 10007  4.  Right hand irrigation and sharp excisional debridement of skin, subcutaneous tissue, tendon and bone right long finger, cpt 94065     Evaluation Date: 6/18/2024  Insurance Authorization Period Expiration: 12/31/2024  Plan of Care Certification Period: 9/10/24  Date of Return to MD:  6/24/2024   Visit # / Visits authorized: 21/ 30  FOTO Function Score [id number]   53% on 6/14/2024     Precautions:  Standard   Time In: 1:30 am   Time Out: 2:25 pm  Total Billable Time: 55 min       SUBJECTIVE     Pt reports compliance with HEP and notes improvement in rom.  Response to previous treatment:  Nothing new reported    Functional change: Nothing new reported      Pain: 0/10  Location: right long finger      OBJECTIVE   Objective Measures updated at progress report unless specified.    Observation/Appearance:  Mild edema long finger, marquez at PIP remains but visibly decreased. Mild PIP flexion contracture continues.     Edema. Measured in centimeters.    6/18/2024 7/22/24     Right  Left   Long:         P1 7.6  7.4    PIP suture  7.9   P2 7.2  7.1    DIP       P3       Hand ROM. Measured in degrees.    6/18/2024 7/3/2024  7/17/2024 7/22/24 8/5/2024 8/13/2024 8/15/2024     Right RIght   R        Limited by post-op restrictions                       Long:  MP     /82 83 83 82 93              PIP   -8/ 40 degrees to limits of exercise orthosis  -5/65 -12/61 -10/69 -15/71 -8/80              DIP     /35 22 24 25 35              CHAN                              8/20/2024 8/20/2024     Left   Right        113, 103, 108  Avg =108 48, 50, 65  Avg = 54    Lat pinch       3pt pinch  17 16.5    2 pt pinch                 Treatment     Theo received the treatments listed below    Supervised Modality: Patient received fluido therapy to left hand  with fingers taped in flexion for 10 minutes to increase blood flow, circulation, pain management and for tissue elasticity prior to therex    Therapeutic Exercise: 25 min  Application of coban to stretch fingers into flexion during fluido  LLPS: PROM PIP extension, Isolated DIP flexion, Hook, straight fist, full fist 20+ reps each  AROM: IP joint blocking  10+ reps each  Reverse jt block  x 10   AAROM: hooks, Straight fists, full fists  10+ reps    Therapeutic activities x 20 minutes  BTE Simulator II                   Tool Plane of motion and time                   504   Wrist flex, wrist ext 45 sec ea   302  Rad dev, ulnar dev 45 sec   162  x 60 sec   162 lat pinch,3-jaw pinch 45 sec ea      Pom  long finger to palm x 3 min   Pom pick-up, green clothespin,  3-jaw, x 3 min  Pom kicks x 3 min     Patient Education and Home Exercises      Education provided:     - Progress towards goals     Written Home Exercises Provided: NT    ASSESSMENT     Pt tolerated session well.   Client Care conference completed with evaluating therapist in regards to this patients POC as evidenced by co signature of supervising therapist.       Theo is progressing  towards his gals and there are no updates to goals at this time. Pt prognosis is Excellent.     Pt will continue to benefit from skilled outpatient occupational therapy to address the deficits listed in the problem list on initial evaluation, provide pt/family education and to maximize pt's level of independence in the home and community environment.     Pt's spiritual, cultural and educational needs considered and pt agreeable to plan of care and goals.    Anticipated barriers to occupational therapy: Duration and nature of condiction    Goals:  Long Term Goals (LTGs); to be met by discharge.  LTG #1: Pt will report a pain level of 2 out of 10 with ADLs/IADLs  (MET)        LTG #2: Pt will demo improved FOTO score by 20 points.   LTG #3: Pt will return to prior level of function for ADLs /IADLs     Short Term Goals (STGs); to be met within 6 weeks (7/30/2024)  STG #1: Pt will report a pain level of 2 out of 10 with use in light ADLs. (MET)  STG #2: Pt will demo improved FOTO score by 10 points.   STG #3: Pt will reports independence in light ADL's with use of the involved Hand/wrist/UE  (MET)  STG #4: Pt will demonstrate independence with issued HEP. (MET)  STG #5: Pt will be able to touch tip to palm   STG #6: Pt will be able to extend the PIP to (-10 degrees) (MET)       PLAN     Continue skilled occupational therapy with individualized plan of care focusing on rom and strengthening.     Updates/Grading for next session: Progress as tolerated.   BONNIE Toribio/L

## 2024-09-03 ENCOUNTER — LAB VISIT (OUTPATIENT)
Dept: LAB | Facility: HOSPITAL | Age: 54
End: 2024-09-03
Attending: UROLOGY
Payer: COMMERCIAL

## 2024-09-03 ENCOUNTER — CLINICAL SUPPORT (OUTPATIENT)
Dept: REHABILITATION | Facility: HOSPITAL | Age: 54
End: 2024-09-03
Payer: COMMERCIAL

## 2024-09-03 ENCOUNTER — OFFICE VISIT (OUTPATIENT)
Dept: UROLOGY | Facility: CLINIC | Age: 54
End: 2024-09-03
Payer: COMMERCIAL

## 2024-09-03 VITALS
BODY MASS INDEX: 29.18 KG/M2 | HEART RATE: 71 BPM | DIASTOLIC BLOOD PRESSURE: 85 MMHG | HEIGHT: 66 IN | WEIGHT: 181.56 LBS | SYSTOLIC BLOOD PRESSURE: 136 MMHG

## 2024-09-03 DIAGNOSIS — R97.20 ELEVATED PSA: ICD-10-CM

## 2024-09-03 DIAGNOSIS — M79.644 PAIN OF RIGHT MIDDLE FINGER: Primary | ICD-10-CM

## 2024-09-03 DIAGNOSIS — R31.0 GROSS HEMATURIA: Primary | ICD-10-CM

## 2024-09-03 DIAGNOSIS — M25.641 STIFFNESS OF FINGER JOINT OF RIGHT HAND: ICD-10-CM

## 2024-09-03 LAB
BILIRUB SERPL-MCNC: NEGATIVE MG/DL
BLOOD URINE, POC: ABNORMAL
CLARITY, POC UA: CLEAR
COLOR, POC UA: YELLOW
GLUCOSE UR QL STRIP: NEGATIVE
KETONES UR QL STRIP: NEGATIVE
LEUKOCYTE ESTERASE URINE, POC: NEGATIVE
NITRITE, POC UA: NEGATIVE
PH, POC UA: 6
POC RESIDUAL URINE VOLUME: 232 ML (ref 0–100)
PROSTATE SPECIFIC ANTIGEN, TOTAL: 36 NG/ML (ref 0–4)
PROTEIN, POC: NEGATIVE
PSA FREE MFR SERPL: 18.11 %
PSA FREE SERPL-MCNC: 6.52 NG/ML (ref 0–1.5)
SPECIFIC GRAVITY, POC UA: 1.01
UROBILINOGEN, POC UA: NORMAL

## 2024-09-03 PROCEDURE — 97018 PARAFFIN BATH THERAPY: CPT

## 2024-09-03 PROCEDURE — 3008F BODY MASS INDEX DOCD: CPT | Mod: CPTII,S$GLB,, | Performed by: UROLOGY

## 2024-09-03 PROCEDURE — 84153 ASSAY OF PSA TOTAL: CPT | Performed by: UROLOGY

## 2024-09-03 PROCEDURE — 1159F MED LIST DOCD IN RCRD: CPT | Mod: CPTII,S$GLB,, | Performed by: UROLOGY

## 2024-09-03 PROCEDURE — 99999 PR PBB SHADOW E&M-EST. PATIENT-LVL III: CPT | Mod: PBBFAC,,, | Performed by: UROLOGY

## 2024-09-03 PROCEDURE — 88112 CYTOPATH CELL ENHANCE TECH: CPT | Performed by: STUDENT IN AN ORGANIZED HEALTH CARE EDUCATION/TRAINING PROGRAM

## 2024-09-03 PROCEDURE — 36415 COLL VENOUS BLD VENIPUNCTURE: CPT | Performed by: UROLOGY

## 2024-09-03 PROCEDURE — 3079F DIAST BP 80-89 MM HG: CPT | Mod: CPTII,S$GLB,, | Performed by: UROLOGY

## 2024-09-03 PROCEDURE — 97110 THERAPEUTIC EXERCISES: CPT

## 2024-09-03 PROCEDURE — 97530 THERAPEUTIC ACTIVITIES: CPT

## 2024-09-03 PROCEDURE — 99214 OFFICE O/P EST MOD 30 MIN: CPT | Mod: S$GLB,,, | Performed by: UROLOGY

## 2024-09-03 PROCEDURE — 3044F HG A1C LEVEL LT 7.0%: CPT | Mod: CPTII,S$GLB,, | Performed by: UROLOGY

## 2024-09-03 PROCEDURE — 3075F SYST BP GE 130 - 139MM HG: CPT | Mod: CPTII,S$GLB,, | Performed by: UROLOGY

## 2024-09-03 PROCEDURE — 51798 US URINE CAPACITY MEASURE: CPT | Mod: S$GLB,,, | Performed by: UROLOGY

## 2024-09-03 PROCEDURE — 81002 URINALYSIS NONAUTO W/O SCOPE: CPT | Mod: S$GLB,,, | Performed by: UROLOGY

## 2024-09-03 NOTE — PROGRESS NOTES
[facility]  Occupational Therapy Treatment Note    Date: 9/3/2024  Name: Theo Matthew Jose D  Clinic Number: [id number]    Therapy Diagnosis:   Encounter Diagnoses   Name Primary?    Pain of right middle finger Yes    Stiffness of finger joint of right hand        Physician: Maryjo Chamberlain MD    Physician Orders: Zones III & IV(Central Slip/Boutonniere)   Initiate treatment 3-5 days post -op. Pt to attend therapy 3x/wk.  Fabricate 2-3 splints for immobilization and exercises per [facility] [employer] Protocol.  1 hand-based immobilization splint and additional exercise splints per structures repaired are to be fabricated (see below): If zone III, all patients will receive the Immobilization Orthosis 1: Hand based with MCP in 30° flexion volar with PIP & DIP 0- to be worn 100% except for exercise. (). Sutures removed at 10 days post op. 13d for DMII. Exercise to per formed hourly such as instructions below:     Medical Diagnosis: L03.011 (ICD-10-CM) - Cellulitis of right middle finger S66.929A (ICD-10-CM) - Laceration of tendon of finger M65.849 (ICD-10-CM) - Extensor tenosynovitis of finger      DOS: 6/11/2024  Procedure:   Right long finger extensor tendon repair zone III, cpt 55224  2.  Right long finger tenosynovectomy EDC tendons, cpt 231249  3.  Right long finger extensor tenolysis, cpt 25729  4.  Right hand irrigation and sharp excisional debridement of skin, subcutaneous tissue, tendon and bone right long finger, cpt 37682     Evaluation Date: 6/18/2024  Insurance Authorization Period Expiration: 12/31/2024  Plan of Care Certification Period: 9/10/24  Date of Return to MD:  6/24/2024   Visit # / Visits authorized: 22/ 30  FOTO Function Score [id number]   53% on 6/14/2024     Precautions:  Standard   Time In: 2:00 pm   Time Out: 2:45 pm  Total Billable Time: 45 min       SUBJECTIVE     Pt reports his finger did not used to close as much as it does now.   Response to previous treatment:  "Nothing new reported    Functional change: Nothing new reported      Pain: 0/10  Location: right long finger      OBJECTIVE   Objective Measures updated at progress report unless specified.    Observation/Appearance:  Mild edema long finger, marquez at PIP remains but visibly decreased. Mild PIP flexion contracture continues.     Edema. Measured in centimeters.    6/18/2024 7/22/24     Right  Left   Long:         P1 7.6  7.4    PIP suture  7.9   P2 7.2  7.1    DIP       P3       Hand ROM. Measured in degrees.    6/18/2024 7/3/2024  7/17/2024 7/22/24 8/5/2024 8/13/2024 8/15/2024     Right RIght   R        Limited by post-op restrictions                       Long:  MP     /82 83 83 82 93              PIP   -8/ 40 degrees to limits of exercise orthosis  -5/65 -12/61 -10/69 -15/71 -8/80              DIP     /35 22 24 25 35              CHAN                              8/20/2024 8/20/2024     Left   Right        113, 103, 108  Avg =108 48, 50, 65  Avg = 54    Lat pinch       3pt pinch  17 16.5    2 pt pinch                 Treatment     Thoe received the treatments listed below    Supervised Modality: Patient received paraffin bath to the right hand(s) for 10 minutes to increase blood flow, circulation, pain management and for tissue elasticity prior to therex.                                                        With coban wrap in digit flexion  Patient received fluido therapy to left hand  with fingers taped in flexion for 10 minutes to increase blood flow, circulation, pain management and for tissue elasticity prior to therex (NT)    Therapeutic Exercise: 25 min  Application of coban to stretch fingers into flexion during paraffin  LLPS: PROM PIP extension, Isolated DIP flexion, Hook, straight fist, full fist 20+ reps each  AROM: IP joint blocking  10+ reps each  Reverse jt block  x 10   MP flexion with WF x 10 with 5" holds - for EDC stretch  AAROM: hooks, Straight fists, full fists  10+ reps  Composite fist " "with WF x 10 with 5" holds    Therapeutic activities x 20 minutes  BTE Simulator II                   Tool Plane of motion and time                   504   Wrist flex, wrist ext 45 sec ea   302  Rad dev, ulnar dev 45 sec   162  x 60 sec   162 lat pinch,3-jaw pinch 45 sec ea      Pom  long finger to palm x 2 containers   Pom pick-up, green clothespin, 3-jaw, x 2 containers  Pom kicks x 3 min     Patient Education and Home Exercises      Education provided:   - Progress towards goals     Written Home Exercises Provided: NT    ASSESSMENT     Pt tolerated session well. Good performance of all activities and exercises this date with no breaks required. Some minor discomfort over the dorsal aspect of the effected finger during AAROM.     Theo is progressing  towards his gals and there are no updates to goals at this time. Pt prognosis is Excellent.     Pt will continue to benefit from skilled outpatient occupational therapy to address the deficits listed in the problem list on initial evaluation, provide pt/family education and to maximize pt's level of independence in the home and community environment.     Pt's spiritual, cultural and educational needs considered and pt agreeable to plan of care and goals.    Anticipated barriers to occupational therapy: Duration and nature of condiction    Goals:  Long Term Goals (LTGs); to be met by discharge.  LTG #1: Pt will report a pain level of 2 out of 10 with ADLs/IADLs  (MET)        LTG #2: Pt will demo improved FOTO score by 20 points.   LTG #3: Pt will return to prior level of function for ADLs /IADLs     Short Term Goals (STGs); to be met within 6 weeks (7/30/2024)  STG #1: Pt will report a pain level of 2 out of 10 with use in light ADLs. (MET)  STG #2: Pt will demo improved FOTO score by 10 points.   STG #3: Pt will reports independence in light ADL's with use of the involved Hand/wrist/UE  (MET)  STG #4: Pt will demonstrate independence with issued HEP. " (MET)  STG #5: Pt will be able to touch tip to palm   STG #6: Pt will be able to extend the PIP to (-10 degrees) (MET)       PLAN     Continue skilled occupational therapy with individualized plan of care focusing on rom and strengthening.     Updates/Grading for next session: Progress as tolerated.   Wellington Banegas, OT

## 2024-09-03 NOTE — PROGRESS NOTES
Subjective:       Patient ID: Theo Matthew Jr. is a 54 y.o. male.    Chief Complaint: blood in urine     This is a 54 y.o.  male patient that is new to me.  The patient was self referred for blood in urine and elevated PSA.  Seen by Dr. Bedoya in past for elevated PSA up to 17 in 2022.  Prostate biopsy was recommended but patient did not want to proceed.    Recent PSA up to 37.3 (5/2024).  Was also elevated in 2023 to 22.9, no follow up with urology.    Now gross hematuria on occasion for a few weeks.  No pain.  , UA with trace blood, no UTI.    Reports no LUTs, no significant weight loss, no bone pain.       LAST PSA  Lab Results   Component Value Date    PSA 37.3 (H) 05/02/2024    PSA 22.9 (H) 03/21/2023    PSA 14.9 (H) 12/08/2021    PSADIAG 17.2 (H) 01/06/2022       Lab Results   Component Value Date    CREATININE 0.9 08/16/2024       ---  PMH/PSH/Medications/Allergies/Social history reviewed and as in chart.    Review of Systems   Constitutional:  Negative for activity change, chills and fever.   HENT:  Negative for congestion.    Respiratory:  Negative for cough, chest tightness and shortness of breath.    Cardiovascular:  Negative for chest pain and palpitations.   Gastrointestinal:  Negative for abdominal distention, abdominal pain, nausea and vomiting.   Genitourinary:  Positive for hematuria. Negative for difficulty urinating, flank pain, penile pain, scrotal swelling and testicular pain.   Musculoskeletal:  Negative for gait problem.       Objective:      Physical Exam  Constitutional:       Appearance: Normal appearance.   HENT:      Head: Normocephalic.   Pulmonary:      Effort: Pulmonary effort is normal.      Breath sounds: Normal breath sounds.   Abdominal:      General: Abdomen is flat. Bowel sounds are normal.      Palpations: Abdomen is soft.      Tenderness: There is no abdominal tenderness. There is no right CVA tenderness, left CVA tenderness or guarding.   Genitourinary:     Comments:  Refused rectal exam  Musculoskeletal:         General: Normal range of motion.      Cervical back: Normal range of motion.   Skin:     General: Skin is warm.   Neurological:      Mental Status: He is alert.         Assessment:     Problem Noted   Gross Hematuria 9/3/2024   Elevated Psa 9/3/2024    Up to 17 in 2022, Dr. Bedoya recommended biopsy, patient did not proceed.         Plan:     For gross hematuria: CT urogram, cystoscopy, cytology  MRI prostate given significantly elevated PSA, discussed concern for possible advanced prostate cancer causing his hematuria.  Encouraged patient to proceed with workup.  He agrees to current workup as above.  Refused rectal exam today.      John Zuluaga MD    The above referenced imaging and interpretations were personally reviewed.  Disclaimer: This note has been generated using voice-recognition software. There may be typographical errors that have been missed during proof-reading.

## 2024-09-05 ENCOUNTER — PATIENT MESSAGE (OUTPATIENT)
Dept: UROLOGY | Facility: CLINIC | Age: 54
End: 2024-09-05
Payer: COMMERCIAL

## 2024-09-05 ENCOUNTER — CLINICAL SUPPORT (OUTPATIENT)
Dept: REHABILITATION | Facility: HOSPITAL | Age: 54
End: 2024-09-05
Payer: COMMERCIAL

## 2024-09-05 DIAGNOSIS — M25.641 STIFFNESS OF FINGER JOINT OF RIGHT HAND: ICD-10-CM

## 2024-09-05 DIAGNOSIS — M79.644 PAIN OF RIGHT MIDDLE FINGER: Primary | ICD-10-CM

## 2024-09-05 LAB
FINAL PATHOLOGIC DIAGNOSIS: ABNORMAL
Lab: ABNORMAL
MICROSCOPIC EXAM: ABNORMAL

## 2024-09-05 PROCEDURE — 97022 WHIRLPOOL THERAPY: CPT

## 2024-09-05 PROCEDURE — 97110 THERAPEUTIC EXERCISES: CPT

## 2024-09-05 NOTE — PROGRESS NOTES
[facility]  Occupational Therapy Treatment Note    Date: 9/5/2024  Name: Theo Matthew Jr.  Clinic Number: [id number]    Therapy Diagnosis:   No diagnosis found.      Physician: Maryjo Chamberlain MD    Physician Orders: Zones III & IV(Central Slip/Boutonniere)   Initiate treatment 3-5 days post -op. Pt to attend therapy 3x/wk.  Fabricate 2-3 splints for immobilization and exercises per [facility] [employer] Protocol.  1 hand-based immobilization splint and additional exercise splints per structures repaired are to be fabricated (see below): If zone III, all patients will receive the Immobilization Orthosis 1: Hand based with MCP in 30° flexion volar with PIP & DIP 0- to be worn 100% except for exercise. (). Sutures removed at 10 days post op. 13d for DMII. Exercise to per formed hourly such as instructions below:     Medical Diagnosis: L03.011 (ICD-10-CM) - Cellulitis of right middle finger S66.929A (ICD-10-CM) - Laceration of tendon of finger M65.849 (ICD-10-CM) - Extensor tenosynovitis of finger      DOS: 6/11/2024  Procedure:   Right long finger extensor tendon repair zone III, cpt 71236  2.  Right long finger tenosynovectomy EDC tendons, cpt 843893  3.  Right long finger extensor tenolysis, cpt 16320  4.  Right hand irrigation and sharp excisional debridement of skin, subcutaneous tissue, tendon and bone right long finger, cpt 80212     Evaluation Date: 6/18/2024  Insurance Authorization Period Expiration: 12/31/2024  Plan of Care Certification Period: 9/10/24  Date of Return to MD:  6/24/2024   Visit # / Visits authorized: 22/ 30  FOTO Function Score [id number]   53% on 6/14/2024     Precautions:  Standard   Time In: 11:00 am   Time Out: 12:05 pm  Total Billable Time: 60 min       SUBJECTIVE     Pt reports that he has not been performing HEP or wearing LMB PIP extension splints as prescribed.    Response to previous treatment: Nothing new reported    Functional change: Nothing new  "reported      Pain: 0/10  Location: right long finger      OBJECTIVE   Objective Measures updated at progress report unless specified.    Observation/Appearance:  Mild edema long finger, marquez at PIP remains but visibly decreased. Mild PIP flexion contracture continues.     Edema. Measured in centimeters.    6/18/2024 7/22/24     Right  Left   Long:         P1 7.6  7.4    PIP suture  7.9   P2 7.2  7.1    DIP       P3       Hand ROM. Measured in degrees.    6/18/2024 7/3/2024  7/17/2024 7/22/24 8/5/2024 8/13/2024 8/15/2024 9/5/2024     Right RIght   R         Limited by post-op restrictions                         Long:  MP     /82 83 83 82 93 89              PIP   -8/ 40 degrees to limits of exercise orthosis  -5/65 -12/61 -10/69 -15/71 -8/80 -18/84              DIP     /35 22 24 25 35 35              CHAN                                8/20/2024 8/20/2024     Left   Right        113, 103, 108  Avg =108 48, 50, 65  Avg = 54    Lat pinch       3pt pinch  17 16.5    2 pt pinch                 Treatment     Theo received the treatments listed below    Supervised Modality:                                                      Patient received fluido therapy to left hand  with fingers taped in flexion for 10 minutes to increase blood flow, circulation, pain management and for tissue elasticity prior to therex     Therapeutic Exercise: 50 min  -reassessment of arom  Application of coban to stretch fingers into flexion during fluidotherapy  LLPS: PROM PIP extension, Isolated DIP flexion, Hook, straight fist, full fist 20+ reps each  AROM: IP joint blocking  10+ reps each  Reverse jt block  x 10 (Not Today)  AAROM: hooks, Straight fists, full fists  10+ reps  Fabricated an MP flexion block exercise splint to promote ease and effectiveness of passive and sctive stretch of intrinsics and IP joints. Pt was instructed to use  4x/day x 20 reps each A/P    MP flexion with WF x 10 with 5" holds - for EDC stretch (Not " "Today)  Composite fist with WF x 10 with 5" holds (Not Today)    Therapeutic activities x(Not Today due to time constraints)  BTE Simulator II                   Tool Plane of motion and time                   504   Wrist flex, wrist ext 45 sec ea   302  Rad dev, ulnar dev 45 sec   162  x 60 sec   162 lat pinch,3-jaw pinch 45 sec ea      Pom  long finger to palm x 2 containers   Pom pick-up, green clothespin, 3-jaw, x 2 containers  Pom kicks x 3 min     Patient Education and Home Exercises      Education provided:   - Progress towards goals     Written Home Exercises Provided: NT    ASSESSMENT   Reassessment reveals regression, likely due to non-compliance with HEP and wear of PIP extension splints. Encouraged compliance for maximum outcome.      Theo is progressing  towards his gals and there are no updates to goals at this time. Pt prognosis is Excellent.     Pt will continue to benefit from skilled outpatient occupational therapy to address the deficits listed in the problem list on initial evaluation, provide pt/family education and to maximize pt's level of independence in the home and community environment.     Pt's spiritual, cultural and educational needs considered and pt agreeable to plan of care and goals.    Anticipated barriers to occupational therapy: Duration and nature of condiction    Goals:  Long Term Goals (LTGs); to be met by discharge.  LTG #1: Pt will report a pain level of 2 out of 10 with ADLs/IADLs  (MET)        LTG #2: Pt will demo improved FOTO score by 20 points.   LTG #3: Pt will return to prior level of function for ADLs /IADLs     Short Term Goals (STGs); to be met within 6 weeks (7/30/2024)  STG #1: Pt will report a pain level of 2 out of 10 with use in light ADLs. (MET)  STG #2: Pt will demo improved FOTO score by 10 points.   STG #3: Pt will reports independence in light ADL's with use of the involved Hand/wrist/UE  (MET)  STG #4: Pt will demonstrate independence with " issued HEP. (MET)  STG #5: Pt will be able to touch tip to palm   STG #6: Pt will be able to extend the PIP to (-10 degrees) (MET)       PLAN     Continue skilled occupational therapy with individualized plan of care focusing on rom and strengthening.     Updates/Grading for next session: Progress as tolerated.   Chrissy Snowden, OT

## 2024-09-10 ENCOUNTER — OFFICE VISIT (OUTPATIENT)
Dept: UROLOGY | Facility: CLINIC | Age: 54
End: 2024-09-10
Payer: COMMERCIAL

## 2024-09-10 ENCOUNTER — CLINICAL SUPPORT (OUTPATIENT)
Dept: REHABILITATION | Facility: HOSPITAL | Age: 54
End: 2024-09-10
Payer: COMMERCIAL

## 2024-09-10 VITALS
DIASTOLIC BLOOD PRESSURE: 98 MMHG | SYSTOLIC BLOOD PRESSURE: 153 MMHG | HEIGHT: 66 IN | WEIGHT: 180.31 LBS | HEART RATE: 62 BPM | BODY MASS INDEX: 28.98 KG/M2

## 2024-09-10 DIAGNOSIS — M25.641 STIFFNESS OF FINGER JOINT OF RIGHT HAND: ICD-10-CM

## 2024-09-10 DIAGNOSIS — M79.644 PAIN OF RIGHT MIDDLE FINGER: Primary | ICD-10-CM

## 2024-09-10 DIAGNOSIS — R31.0 GROSS HEMATURIA: Primary | ICD-10-CM

## 2024-09-10 PROCEDURE — 3008F BODY MASS INDEX DOCD: CPT | Mod: CPTII,S$GLB,, | Performed by: UROLOGY

## 2024-09-10 PROCEDURE — 3044F HG A1C LEVEL LT 7.0%: CPT | Mod: CPTII,S$GLB,, | Performed by: UROLOGY

## 2024-09-10 PROCEDURE — 97022 WHIRLPOOL THERAPY: CPT

## 2024-09-10 PROCEDURE — 1159F MED LIST DOCD IN RCRD: CPT | Mod: CPTII,S$GLB,, | Performed by: UROLOGY

## 2024-09-10 PROCEDURE — 99999 PR PBB SHADOW E&M-EST. PATIENT-LVL III: CPT | Mod: PBBFAC,,, | Performed by: UROLOGY

## 2024-09-10 PROCEDURE — 97530 THERAPEUTIC ACTIVITIES: CPT

## 2024-09-10 PROCEDURE — 3080F DIAST BP >= 90 MM HG: CPT | Mod: CPTII,S$GLB,, | Performed by: UROLOGY

## 2024-09-10 PROCEDURE — 3077F SYST BP >= 140 MM HG: CPT | Mod: CPTII,S$GLB,, | Performed by: UROLOGY

## 2024-09-10 PROCEDURE — 99215 OFFICE O/P EST HI 40 MIN: CPT | Mod: S$GLB,,, | Performed by: UROLOGY

## 2024-09-10 NOTE — PROGRESS NOTES
Subjective:       Patient ID: Theo Matthew Jr. is a 54 y.o. male.    Chief Complaint: Results (CT)     This is a 54 y.o.  male patient with hematuria and elevated PSA.  Seen by Dr. Bedoya in past for elevated PSA up to 17 in 2022.  Prostate biopsy was recommended but patient did not want to proceed.    Recent PSA up to 37.3 (5/2024) repeat 9/2024 was 36.  Was also elevated in 2023 to 22.9, no follow up with urology.    Now gross hematuria on occasion for a few weeks.  No pain.  , UA with trace blood, no UTI.    Reports no LUTs, no significant weight loss, no bone pain.     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    CTU 9/2024: large bladder, prostate mass with mild bilateral hydronephrosis, no LAD/mets.        LAST PSA  Lab Results   Component Value Date    PSA 37.3 (H) 05/02/2024    PSA 22.9 (H) 03/21/2023    PSA 14.9 (H) 12/08/2021    PSADIAG 17.2 (H) 01/06/2022    PSATOTAL 36.0 (H) 09/03/2024    PSAFREE 6.52 (H) 09/03/2024       Lab Results   Component Value Date    CREATININE 0.9 08/16/2024       ---  PMH/PSH/Medications/Allergies/Social history reviewed and as in chart.    Review of Systems   Constitutional:  Negative for activity change, chills and fever.   HENT:  Negative for congestion.    Respiratory:  Negative for cough, chest tightness and shortness of breath.    Cardiovascular:  Negative for chest pain and palpitations.   Gastrointestinal:  Negative for abdominal distention, abdominal pain, nausea and vomiting.   Genitourinary:  Positive for hematuria. Negative for difficulty urinating, flank pain, penile pain, scrotal swelling and testicular pain.   Musculoskeletal:  Negative for gait problem.       Objective:      Physical Exam  Constitutional:       Appearance: Normal appearance.   HENT:      Head: Normocephalic.   Pulmonary:      Effort: Pulmonary effort is normal.      Breath sounds: Normal breath sounds.   Abdominal:      General: Abdomen is flat. Bowel sounds are normal.       Palpations: Abdomen is soft.      Tenderness: There is no abdominal tenderness. There is no right CVA tenderness, left CVA tenderness or guarding.   Genitourinary:     Comments: Refused rectal exam  Musculoskeletal:         General: Normal range of motion.      Cervical back: Normal range of motion.   Skin:     General: Skin is warm.   Neurological:      Mental Status: He is alert.         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    CTU 9/2024: large bladder, prostate mass with mild bilateral hydronephrosis, no LAD/mets.       Elevated Psa 9/3/2024    Up to 17 in 2022, Dr. Bedoya recommended biopsy, patient did not proceed.         Plan:     Long discussion with patient.  CTU reviewed, will cancel MRI prostate for now.  Cytology suspicious for HG UCB.  Discussed implications.  Discussed need for cystoscopy, TURBT, prostate biopsy, possible RPGs, stents, possible PCNs.  [place] [facility] [place] as will likely require advanced treatments going forward.        John Zuluaga MD    The above referenced imaging and interpretations were personally reviewed.  Disclaimer: This note has been generated using voice-recognition software. There may be typographical errors that have been missed during proof-reading.

## 2024-09-10 NOTE — PLAN OF CARE
[facility]  Occupational Therapy Plan of Care Note     Name: Theo Matthew JrJose D  Clinic Number: [id number]    Therapy Diagnosis:   Encounter Diagnoses   Name Primary?    Pain of right middle finger Yes    Stiffness of finger joint of right hand      Physician: Maryjo Chamberlain MD    Visit Date: 9/10/2024    Physician Orders: Continue with ROM and strengthening  Medical Diagnosis from Referral: Medical Diagnosis: L03.011 (ICD-10-CM) - Cellulitis of right middle finger S66.929A (ICD-10-CM) - Laceration of tendon of finger M65.849 (ICD-10-CM) - Extensor tenosynovitis of finger     EDOS: 6/11/2024  Procedure:   Right long finger extensor tendon repair zone III, cpt 08878  2.  Right long finger tenosynovectomy EDC tendons, cpt 713165  3.  Right long finger extensor tenolysis, cpt 60660  4.  Right hand irrigation and sharp excisional debridement of skin, subcutaneous tissue, tendon and bone right long finger, cpt 47846     Evaluation Date: 6/18/2024  Insurance Authorization Period Expiration: 12/31/2024  Plan of Care Certification Period: 9/10/24  Date of Return to MD:  6/24/2024   Visit # / Visits authorized: 22/ 30  FOTO Function Score [id number]   53% on 6/14/2024  Functional Level Prior to Evaluation:  full use  DOS: 6/11/2024  Procedure:   Right long finger extensor tendon repair zone III, cpt 29119  2.  Right long finger tenosynovectomy EDC tendons, cpt 483915  3.  Right long finger extensor tenolysis, cpt 51319  4.  Right hand irrigation and sharp excisional debridement of skin, subcutaneous tissue, tendon and bone right long finger, cpt 78085     Evaluation Date: 6/18/2024  Insurance Authorization Period Expiration: 12/31/2024  Plan of Care Certification Period: 9/10/24  Date of Return to MD:  6/24/2024   Visit # / Visits authorized: 22/ 30  FOTO Function Score [id number]   53% on 6/14/2024    Precautions:  Standard   Time In: 11:00 am   Time Out: 12:05 pm  Total Billable Time: 60 min   Subjective      Update: Pt reports that he has not been performing HEP or wearing LMB PIP extension splints as prescribed.     Response to previous treatment: Nothing new reported     Functional change: Nothing new reported        Pain: 0/10  Location: right long finger      Objective      Update: Objective Measures updated at progress report unless specified.     Observation/Appearance:  Mild edema long finger, marquez at PIP remains but visibly decreased. Mild PIP flexion contracture continues.      Edema. Measured in centimeters.    6/18/2024 7/22/24     Right  Left   Long:         P1 7.6  7.4    PIP suture  7.9   P2 7.2  7.1    DIP       P3       Hand ROM. Measured in degrees.    6/18/2024 7/3/2024  7/17/2024 7/22/24 8/5/2024 8/13/2024 8/15/2024 9/5/2024     Right RIght    R             Limited by post-op restrictions                                     Long:  MP     /82 83 83 82 93 89              PIP   -8/ 40 degrees to limits of exercise orthosis  -5/65 -12/61 -10/69 -15/71 -8/80 -18/84              DIP     /35 22 24 25 35 35              CHAN                                               8/20/2024 8/20/2024       Left   Right         113, 103, 108  Avg =108 48, 50, 65  Avg = 54     Lat pinch          3pt pinch  17 16.5     2 pt pinch                      Treatment      Theo received the treatments listed below     Supervised Modality:                                                      Patient received fluido therapy to left hand  with fingers taped in flexion for 10 minutes to increase blood flow, circulation, pain management and for tissue elasticity prior to therex      Therapeutic Exercise: 50 min  -reassessment of arom  Application of coban to stretch fingers into flexion during fluidotherapy  LLPS: PROM PIP extension, Isolated DIP flexion, Hook, straight fist, full fist 20+ reps each  AROM: IP joint blocking  10+ reps each  Reverse jt block  x 10 (Not Today)  AAROM: hooks, Straight fists, full fists  10+  "reps  Fabricated an MP flexion block exercise splint to promote ease and effectiveness of passive and sctive stretch of intrinsics and IP joints. Pt was instructed to use  4x/day x 20 reps each A/P     MP flexion with WF x 10 with 5" holds - for EDC stretch (Not Today)  Composite fist with WF x 10 with 5" holds (Not Today)     Therapeutic activities x(Not Today due to time constraints)      BTE Simulator II                   Tool Plane of motion and time                   504   Wrist flex, wrist ext 45 sec ea   302  Rad dev, ulnar dev 45 sec   162  x 60 sec   162 lat pinch,3-jaw pinch 45 sec ea       Pom  long finger to palm x 2 containers   Pom pick-up, green clothespin, 3-jaw, x 2 containers  Pom kicks x 3 min      Assessment     Update: Reassessment reveals regression, likely due to non-compliance with HEP and wear of PIP extension splints. Encouraged compliance for maximum outcome.     GOALS    Previous Short Term Goals Status:   Met  New Short Term Goals Status = Long Term Goal Status: continue per initial plan of care.  Long Term Goals (LTGs); to be met by discharge.  LTG #1: Pt will report a pain level of 2 out of 10 with ADLs/IADLs  (MET)        LTG #2: Pt will demo improved FOTO score by 20 points.   LTG #3: Pt will return to prior level of function for ADLs /IADLs    Reasons for Recertification of Therapy:   Continued deficits in rom and strength       Plan     Updated Certification Period: 9/10/2024 to 10/8/2024   Recommended Treatment Plan: 2 times per week for 4 weeks:  Fluidotherapy, Manual Therapy, Moist Heat/ Ice, Paraffin, Therapeutic Activities, Therapeutic Exercise, and Ultrasound  Other Recommendations: Progress as tolerated.     Chrissy Snowden, OT  "

## 2024-09-11 ENCOUNTER — HOSPITAL ENCOUNTER (EMERGENCY)
Facility: HOSPITAL | Age: 54
Discharge: HOME | End: 2024-09-11
Attending: EMERGENCY MEDICINE
Payer: COMMERCIAL

## 2024-09-11 VITALS
HEIGHT: 66 IN | BODY MASS INDEX: 28.93 KG/M2 | RESPIRATION RATE: 16 BRPM | WEIGHT: 180 LBS | DIASTOLIC BLOOD PRESSURE: 92 MMHG | OXYGEN SATURATION: 99 % | HEART RATE: 69 BPM | SYSTOLIC BLOOD PRESSURE: 145 MMHG | TEMPERATURE: 100 F

## 2024-09-11 DIAGNOSIS — N30.01 ACUTE CYSTITIS WITH HEMATURIA: Primary | ICD-10-CM

## 2024-09-11 LAB
BACTERIA #/AREA URNS AUTO: ABNORMAL /HPF
BILIRUB UR QL STRIP: NEGATIVE
CLARITY UR REFRACT.AUTO: ABNORMAL
COLOR UR AUTO: COLORLESS
GLUCOSE UR QL STRIP: NEGATIVE
HGB UR QL STRIP: ABNORMAL
HYALINE CASTS UR QL AUTO: 0 /LPF
KETONES UR QL STRIP: NEGATIVE
LEUKOCYTE ESTERASE UR QL STRIP: ABNORMAL
MICROSCOPIC COMMENT: ABNORMAL
NITRITE UR QL STRIP: POSITIVE
PH UR STRIP: 7 [PH] (ref 5–8)
PROT UR QL STRIP: ABNORMAL
RBC #/AREA URNS AUTO: >100 /HPF (ref 0–4)
SP GR UR STRIP: 1.01 (ref 1–1.03)
SQUAMOUS #/AREA URNS AUTO: 0 /HPF
URN SPEC COLLECT METH UR: ABNORMAL
WBC #/AREA URNS AUTO: >100 /HPF (ref 0–5)
WBC CLUMPS UR QL AUTO: ABNORMAL

## 2024-09-11 PROCEDURE — 81001 URINALYSIS AUTO W/SCOPE: CPT | Performed by: EMERGENCY MEDICINE

## 2024-09-11 PROCEDURE — 87088 URINE BACTERIA CULTURE: CPT | Performed by: EMERGENCY MEDICINE

## 2024-09-11 PROCEDURE — 99283 EMERGENCY DEPT VISIT LOW MDM: CPT

## 2024-09-11 PROCEDURE — 87086 URINE CULTURE/COLONY COUNT: CPT | Performed by: EMERGENCY MEDICINE

## 2024-09-11 PROCEDURE — 87186 SC STD MICRODIL/AGAR DIL: CPT | Performed by: EMERGENCY MEDICINE

## 2024-09-11 PROCEDURE — 25000003 PHARM REV CODE 250: Performed by: EMERGENCY MEDICINE

## 2024-09-11 RX ORDER — CIPROFLOXACIN 500 MG/1
500 TABLET ORAL ONCE
Status: COMPLETED | OUTPATIENT
Start: 2024-09-11 | End: 2024-09-11

## 2024-09-11 RX ORDER — CIPROFLOXACIN 500 MG/1
500 TABLET ORAL 2 TIMES DAILY
Qty: 14 TABLET | Refills: 0 | Status: SHIPPED | OUTPATIENT
Start: 2024-09-11 | End: 2024-09-18

## 2024-09-11 RX ADMIN — CIPROFLOXACIN 500 MG: 500 TABLET ORAL at 02:09

## 2024-09-11 NOTE — ED TRIAGE NOTES
Theo Matthew Jr., a 54 y.o. male presents to the ED w/ complaint of hematuria and foul odor to urine    Triage note:  Chief Complaint   Patient presents with    Dysuria    Hematuria     Began yesterday thinks he has a UTI     Review of patient's allergies indicates:  No Known Allergies  Past Medical History:   Diagnosis Date    Internal hemorrhoid          APPEARANCE: awake and alert in NAD. PAIN  7/10  SKIN: warm, dry and intact. No breakdown or bruising.  MUSCULOSKELETAL: Patient moving all extremities spontaneously, no obvious swelling or deformities noted. Ambulates independently.  RESPIRATORY: Denies shortness of breath.Respirations unlabored.   CARDIAC: Denies CP, 2+ distal pulses; no peripheral edema  ABDOMEN: S/ND/NT, Denies nausea  : voids spontaneously, denies difficulty, increased frequency urination  Neurologic: AAO x 4; follows commands equal strength in all extremities; denies numbness/tingling. Denies dizziness

## 2024-09-11 NOTE — DISCHARGE INSTRUCTIONS
Your urine is concerning for urinary tract infection.  I have sent a prescription for Cipro to the pharmacy.  You have given your 1st dose here.  Continue take the medications as prescribed and follow up with the primary doctor

## 2024-09-11 NOTE — ED PROVIDER NOTES
Encounter Date: 9/11/2024       History     Chief Complaint   Patient presents with    Dysuria    Hematuria     Began yesterday thinks he has a UTI     Patient is a 54-year-old male with no significant past medical history presenting today for dysuria and hematuria.  Symptoms started several days ago when he noted blood in his stool.  He also reports urinary urgency and frequency.  He is having a difficult time holding his urine.  He he is sometimes does not feel like he is completely emptying his bladder.  He denies new back pain, fevers, chills, nausea or vomiting.  No testicular pain or swelling.  No history of UTI in the past.  Denies penile discharge      Review of patient's allergies indicates:  No Known Allergies  Past Medical History:   Diagnosis Date    Internal hemorrhoid      Past Surgical History:   Procedure Laterality Date    COLONOSCOPY  02/18/2020    COLONOSCOPY N/A 2/18/2020    Procedure: COLONOSCOPY/Suprep;  Surgeon: Agueda Jacques MD;  Location: [facility];  Service: Endoscopy;  Laterality: N/A;    IRRIGATION AND DEBRIDEMENT OF UPPER EXTREMITY Right 6/11/2024    Procedure: IRRIGATION AND DEBRIDEMENT, UPPER EXTREMITY;  Surgeon: Maryjo Chamberlain MD;  Location: [facility] OR;  Service: Orthopedics;  Laterality: Right;    KNEE LIGAMENT RECONSTRUCTION Right     REPAIR OF EXTENSOR TENDON Right 6/11/2024    Procedure: REPAIR, TENDON, EXTENSOR LONG FINGER;  Surgeon: Maryjo Chamberlain MD;  Location: [facility] OR;  Service: Orthopedics;  Laterality: Right;    TENOLYSIS, EXTENSOR, HAND OR FINGER Right 6/11/2024    Procedure: TENOLYSIS, EXTENSOR, HAND OR FINGER;  Surgeon: Maryjo Chamberlain MD;  Location: [facility] OR;  Service: Orthopedics;  Laterality: Right;    TENOSYNOVECTOMY Right 6/11/2024    Procedure: TENOSYNOVECTOMY, EXTENSOR TENDON LONG FINGER;  Surgeon: Maryjo Chamberlain MD;  Location: [facility] OR;  Service: Orthopedics;  Laterality: Right;     Family History   Problem Relation Name Age of Onset    Seizures Father Theo  Vipul Sr     Alcohol abuse Father Theo Matthew Sr     Hypertension Mother Mom     No Known Problems Sister      Cancer Maternal Grandmother Chen Hernandez         passed 83 pancreate cancer    Diabetes Maternal Grandfather Lory Reeves     Arthritis Paternal Grandmother Lillian Boggs     No Known Problems Paternal Grandfather       Social History     Tobacco Use    Smoking status: Never    Smokeless tobacco: Never   Substance Use Topics    Alcohol use: No    Drug use: No     Review of Systems    Physical Exam     Initial Vitals [09/11/24 1224]   BP Pulse Resp Temp SpO2   (!) 188/103 79 16 98.2 °F (36.8 °C) 97 %      MAP       --         Physical Exam    Nursing note and vitals reviewed.  Constitutional: He appears well-developed and well-nourished. No distress.   HENT:   Mouth/Throat: Oropharynx is clear and moist.   Cardiovascular:  Normal rate and regular rhythm.           Pulmonary/Chest: Breath sounds normal. He has no wheezes. He has no rales.   Abdominal: Abdomen is soft. There is no abdominal tenderness.   - CVA   Genitourinary:    Penis normal.   No discharge found.    Genitourinary Comments: No scrotal tenderness, epididymal tenderness or swelling.     Musculoskeletal:         General: No edema.     Neurological: He is alert and oriented to person, place, and time.   Skin: No rash noted.         ED Course   Procedures  Labs Reviewed   URINALYSIS, REFLEX TO URINE CULTURE - Abnormal       Result Value    Specimen UA Urine, Clean Catch      Color, UA Colorless (*)     Appearance, UA Hazy (*)     pH, UA 7.0      Specific Gravity, UA 1.010      Protein, UA 1+ (*)     Glucose, UA Negative      Ketones, UA Negative      Bilirubin (UA) Negative      Occult Blood UA 3+ (*)     Nitrite, UA Positive (*)     Leukocytes, UA 3+ (*)     Narrative:     Specimen Source->Urine   URINALYSIS MICROSCOPIC - Abnormal    RBC, UA >100 (*)     WBC, UA >100 (*)     WBC Clumps, UA Occasional (*)     Bacteria Many (*)     Squam Epithel,  UA 0      Hyaline Casts, UA 0      Microscopic Comment SEE COMMENT      Narrative:     Specimen Source->Urine   CULTURE, URINE          Imaging Results    None          Medications   ciprofloxacin HCl tablet 500 mg (500 mg Oral Given 9/11/24 1403)     Medical Decision Making  Emergent evaluation a 54-year-old male presenting today for dysuria, hematuria, urgency and frequency.    Vital signs reviewed, hypertensive otherwise stable.    Differential includes but not limited to cystitis, prostatitis, less likely pyelonephritis, colitis based on today's exam and symptoms.    Plan for UA    UA concerning for greater than 100 WBCs RBCs and many bacteria, nitrite positive.  Cipro ordered, prescription sent to the pharmacy.  Recommend follow-up with PCP for further outpatient testing if does not improve    Amount and/or Complexity of Data Reviewed  Labs: ordered. Decision-making details documented in ED Course.    Risk  Prescription drug management.               ED Course as of 09/11/24 1419   Wed Sep 11, 2024   1346 WBC, UA(!): >100 [GM]   1346 RBC, UA(!): >100 [GM]   1346 Bacteria, UA(!): Many [GM]   1346 NITRITE UA(!): Positive [GM]      ED Course User Index  [GM] Shereen Jacques MD                           Clinical Impression:  Final diagnoses:  [N30.01] Acute cystitis with hematuria (Primary)          ED Disposition Condition    Discharge Stable          ED Prescriptions       Medication Sig Dispense Start Date End Date Auth. Provider    ciprofloxacin HCl (CIPRO) 500 MG tablet Take 1 tablet (500 mg total) by mouth 2 (two) times daily. for 7 days 14 tablet 9/11/2024 9/18/2024 Shereen Jacques MD          Follow-up Information       Follow up With Specialties Details Why Contact Info    Tenisha Becerra MD Internal Medicine Schedule an appointment as soon as possible for a visit   [id number] [place] [address]  [address]  [place] [address]  [phone]               Shereen Jacques MD  09/11/24 1419       Shereen Jacques MD  09/11/24  0892

## 2024-09-12 ENCOUNTER — CLINICAL SUPPORT (OUTPATIENT)
Dept: REHABILITATION | Facility: HOSPITAL | Age: 54
End: 2024-09-12
Payer: COMMERCIAL

## 2024-09-12 DIAGNOSIS — M79.644 PAIN OF RIGHT MIDDLE FINGER: Primary | ICD-10-CM

## 2024-09-12 DIAGNOSIS — M25.641 STIFFNESS OF FINGER JOINT OF RIGHT HAND: ICD-10-CM

## 2024-09-12 PROCEDURE — 97110 THERAPEUTIC EXERCISES: CPT | Mod: CO

## 2024-09-12 PROCEDURE — 97022 WHIRLPOOL THERAPY: CPT | Mod: CO

## 2024-09-12 PROCEDURE — 97530 THERAPEUTIC ACTIVITIES: CPT | Mod: CO

## 2024-09-12 NOTE — PROGRESS NOTES
[facility]  Occupational Therapy Treatment Note     Date: 9/12/2024  Name: Theo Matthew Jr.  Clinic Number: [id number]    Therapy Diagnosis:   Encounter Diagnoses   Name Primary?    Pain of right middle finger Yes    Stiffness of finger joint of right hand      Physician: Maryjo Chamberlain MD    Physician Orders: Continue with ROM and strengthening  Medical Diagnosis from Referral: Medical Diagnosis: L03.011 (ICD-10-CM) - Cellulitis of right middle finger S66.929A (ICD-10-CM) - Laceration of tendon of finger M65.849 (ICD-10-CM) - Extensor tenosynovitis of finger          Evaluation Date: 6/18/2024  Insurance Authorization Period Expiration: 12/31/2024  Plan of Care Certification Period: 10/8/24  Date of Return to MD:  6/24/2024   Visit # / Visits authorized: 25/ 30  FOTO Function Score [id number]   53% on 6/14/2024  Functional Level Prior to Evaluation:  full use  DOS: 6/11/2024  Procedure:   Right long finger extensor tendon repair zone III, cpt 22524  2.  Right long finger tenosynovectomy EDC tendons, cpt 312172  3.  Right long finger extensor tenolysis, cpt 26875  4.  Right hand irrigation and sharp excisional debridement of skin, subcutaneous tissue, tendon and bone right long finger, cpt 96944            Precautions:  Standard   Time In: 1:18 am   Time Out: 2:15 pm  Total Billable Time: 57 min       Subjective     Patient reports: doing well   He was compliant with home exercise program given last session.   Response to previous treatment: good   Functional change: able to use hand functionally at home     Pain: 0/10  Location: right hands      Objective     Objective Measures updated at progress report unless specified.  Update: Objective Measures updated at progress report unless specified.     Observation/Appearance:  Mild edema long finger, marquez at PIP remains but visibly decreased. Mild PIP flexion contracture continues.      Edema. Measured in centimeters.    6/18/2024 7/22/24      "Right  Left   Long:         P1 7.6  7.4    PIP suture  7.9   P2 7.2  7.1    DIP       P3       Hand ROM. Measured in degrees.    6/18/2024 7/3/2024  7/17/2024 7/22/24 8/5/2024 8/13/2024 8/15/2024 9/5/2024     Right RIght    R             Limited by post-op restrictions                                     Long:  MP     /82 83 83 82 93 89              PIP   -8/ 40 degrees to limits of exercise orthosis  -5/65 -12/61 -10/69 -15/71 -8/80 -18/84              DIP     /35 22 24 25 35 35              CHAN                                               8/20/2024 8/20/2024       Left   Right         113, 103, 108  Avg =108 48, 50, 65  Avg = 54     Lat pinch          3pt pinch  17 16.5     2 pt pinch                        Treatment     Theo received the treatments listed below:    Supervised Modality:                                                      Patient received fluido therapy to left hand  with fingers taped in flexion for 10 minutes to increase blood flow, circulation, pain management and for tissue elasticity prior to therex      Therapeutic Exercise: 22 min  Application of coban to stretch fingers into flexion during fluidotherapy  LLPS: PROM PIP extension, Isolated DIP flexion, Hook, straight fist, full fist 20+ reps each  AROM: IP joint blocking  10+ reps each  Reverse jt block  x 10   AAROM: hooks, Straight fists, full fists  10+ reps    MP flexion with WF x 10 with 5" holds - for EDC stretch (Not Today)  Composite fist with WF x 10 with 5" holds     Therapeutic activities x25 min        BTE Simulator II                   Tool Plane of motion and time                   504   Wrist flex, wrist ext 45 sec ea   302  Rad dev, ulnar dev 45 sec   162  x 60 sec   162 lat pinch,3-jaw pinch 45 sec ea       Pom  long finger to palm x 2 containers   Pom pick-up, green clothespin, 3-jaw, x 2 containers  Pom kicks x 3 min        Patient Education and Home Exercises     Education provided:   - cont HEP  - " Progress towards goals     Written Home Exercises Provided: Pt instructed to continue prior HEP.  Exercises were reviewed and Theo was able to demonstrate them prior to the end of the session.  Theo demonstrated good  understanding of the home exercise program provided. See electronic medical record under Patient Instructions for exercises provided during therapy sessions.       Assessment     Pt tolerated session well     Theo is progressing well towards his goals and there are no updates to goals at this time. Pt prognosis is Good.     Patient will continue to benefit from skilled outpatient occupational therapy to address the deficits listed in the problem list on initial evaluation provide patient/family education and to maximize patient's level of independence in the home and community environment.     Patient's spiritual, cultural and educational needs considered and patient agreeable to plan of care and goals.    Anticipated barriers to occupational therapy: Duration and nature of condiction     Goals:     Previous Short Term Goals Status:   Met  New Short Term Goals Status = Long Term Goal Status: continue per initial plan of care.  Long Term Goals (LTGs); to be met by discharge.  LTG #1: Pt will report a pain level of 2 out of 10 with ADLs/IADLs  (MET)        LTG #2: Pt will demo improved FOTO score by 20 points.   LTG #3: Pt will return to prior level of function for ADLs /IADLs    Plan     Updates/Grading for next session: cont as tolerated     LUCIO Toribio   9/12/2024

## 2024-09-13 LAB — BACTERIA UR CULT: ABNORMAL

## 2024-09-16 ENCOUNTER — LAB VISIT (OUTPATIENT)
Dept: LAB | Facility: HOSPITAL | Age: 54
End: 2024-09-16
Attending: INTERNAL MEDICINE
Payer: COMMERCIAL

## 2024-09-16 DIAGNOSIS — D64.9 ANEMIA, UNSPECIFIED TYPE: ICD-10-CM

## 2024-09-16 DIAGNOSIS — L03.011 CELLULITIS OF RIGHT MIDDLE FINGER: ICD-10-CM

## 2024-09-16 LAB
ALBUMIN SERPL BCP-MCNC: 3.8 G/DL (ref 3.5–5.2)
ALP SERPL-CCNC: 58 U/L (ref 55–135)
ALT SERPL W/O P-5'-P-CCNC: 17 U/L (ref 10–44)
ANION GAP SERPL CALC-SCNC: 8 MMOL/L (ref 8–16)
AST SERPL-CCNC: 21 U/L (ref 10–40)
BASOPHILS # BLD AUTO: 0.03 K/UL (ref 0–0.2)
BASOPHILS NFR BLD: 0.5 % (ref 0–1.9)
BILIRUB SERPL-MCNC: 0.5 MG/DL (ref 0.1–1)
BUN SERPL-MCNC: 11 MG/DL (ref 6–20)
CALCIUM SERPL-MCNC: 9.7 MG/DL (ref 8.7–10.5)
CHLORIDE SERPL-SCNC: 108 MMOL/L (ref 95–110)
CO2 SERPL-SCNC: 28 MMOL/L (ref 23–29)
CREAT SERPL-MCNC: 1.3 MG/DL (ref 0.5–1.4)
CRP SERPL-MCNC: 8.3 MG/L (ref 0–8.2)
DIFFERENTIAL METHOD BLD: ABNORMAL
EOSINOPHIL # BLD AUTO: 0 K/UL (ref 0–0.5)
EOSINOPHIL NFR BLD: 0.2 % (ref 0–8)
ERYTHROCYTE [DISTWIDTH] IN BLOOD BY AUTOMATED COUNT: 13.3 % (ref 11.5–14.5)
ERYTHROCYTE [SEDIMENTATION RATE] IN BLOOD BY PHOTOMETRIC METHOD: 26 MM/HR (ref 0–23)
EST. GFR  (NO RACE VARIABLE): >60 ML/MIN/1.73 M^2
FOLATE SERPL-MCNC: 9.1 NG/ML (ref 4–24)
GLUCOSE SERPL-MCNC: 103 MG/DL (ref 70–110)
HCT VFR BLD AUTO: 39 % (ref 40–54)
HGB BLD-MCNC: 12.6 G/DL (ref 14–18)
IMM GRANULOCYTES # BLD AUTO: 0.01 K/UL (ref 0–0.04)
IMM GRANULOCYTES NFR BLD AUTO: 0.2 % (ref 0–0.5)
IRON SERPL-MCNC: 132 UG/DL (ref 45–160)
LYMPHOCYTES # BLD AUTO: 1.9 K/UL (ref 1–4.8)
LYMPHOCYTES NFR BLD: 33.3 % (ref 18–48)
MCH RBC QN AUTO: 29.2 PG (ref 27–31)
MCHC RBC AUTO-ENTMCNC: 32.3 G/DL (ref 32–36)
MCV RBC AUTO: 90 FL (ref 82–98)
MONOCYTES # BLD AUTO: 0.6 K/UL (ref 0.3–1)
MONOCYTES NFR BLD: 9.9 % (ref 4–15)
NEUTROPHILS # BLD AUTO: 3.1 K/UL (ref 1.8–7.7)
NEUTROPHILS NFR BLD: 55.9 % (ref 38–73)
NRBC BLD-RTO: 0 /100 WBC
PLATELET # BLD AUTO: 191 K/UL (ref 150–450)
PMV BLD AUTO: 12.9 FL (ref 9.2–12.9)
POTASSIUM SERPL-SCNC: 3.8 MMOL/L (ref 3.5–5.1)
PROT SERPL-MCNC: 7.4 G/DL (ref 6–8.4)
RBC # BLD AUTO: 4.32 M/UL (ref 4.6–6.2)
SODIUM SERPL-SCNC: 144 MMOL/L (ref 136–145)
TSH SERPL DL<=0.005 MIU/L-ACNC: 1.25 UIU/ML (ref 0.4–4)
VIT B12 SERPL-MCNC: 410 PG/ML (ref 210–950)
WBC # BLD AUTO: 5.55 K/UL (ref 3.9–12.7)

## 2024-09-16 PROCEDURE — 83540 ASSAY OF IRON: CPT | Performed by: INTERNAL MEDICINE

## 2024-09-16 PROCEDURE — 85652 RBC SED RATE AUTOMATED: CPT | Performed by: INTERNAL MEDICINE

## 2024-09-16 PROCEDURE — 80053 COMPREHEN METABOLIC PANEL: CPT | Performed by: INTERNAL MEDICINE

## 2024-09-16 PROCEDURE — 84443 ASSAY THYROID STIM HORMONE: CPT | Performed by: INTERNAL MEDICINE

## 2024-09-16 PROCEDURE — 85025 COMPLETE CBC W/AUTO DIFF WBC: CPT | Performed by: INTERNAL MEDICINE

## 2024-09-16 PROCEDURE — 36415 COLL VENOUS BLD VENIPUNCTURE: CPT | Performed by: INTERNAL MEDICINE

## 2024-09-16 PROCEDURE — 82746 ASSAY OF FOLIC ACID SERUM: CPT | Performed by: INTERNAL MEDICINE

## 2024-09-16 PROCEDURE — 86140 C-REACTIVE PROTEIN: CPT | Performed by: INTERNAL MEDICINE

## 2024-09-16 PROCEDURE — 82607 VITAMIN B-12: CPT | Performed by: INTERNAL MEDICINE

## 2024-09-17 ENCOUNTER — CLINICAL SUPPORT (OUTPATIENT)
Dept: REHABILITATION | Facility: HOSPITAL | Age: 54
End: 2024-09-17
Payer: COMMERCIAL

## 2024-09-17 DIAGNOSIS — M25.641 STIFFNESS OF FINGER JOINT OF RIGHT HAND: ICD-10-CM

## 2024-09-17 DIAGNOSIS — M79.644 PAIN OF RIGHT MIDDLE FINGER: Primary | ICD-10-CM

## 2024-09-17 PROCEDURE — 97110 THERAPEUTIC EXERCISES: CPT

## 2024-09-17 PROCEDURE — 97022 WHIRLPOOL THERAPY: CPT

## 2024-09-17 PROCEDURE — 97530 THERAPEUTIC ACTIVITIES: CPT

## 2024-09-17 NOTE — PROGRESS NOTES
[facility]  Occupational Therapy Treatment Note     Date: 9/17/2024  Name: Theo Matthew Jr.  Clinic Number: [id number]    Therapy Diagnosis:   Encounter Diagnoses   Name Primary?    Pain of right middle finger Yes    Stiffness of finger joint of right hand      Physician: Maryjo Chamberlain MD    Physician Orders: Continue with ROM and strengthening  Medical Diagnosis from Referral: Medical Diagnosis: L03.011 (ICD-10-CM) - Cellulitis of right middle finger S66.929A (ICD-10-CM) - Laceration of tendon of finger M65.849 (ICD-10-CM) - Extensor tenosynovitis of finger          Evaluation Date: 6/18/2024  Insurance Authorization Period Expiration: 12/31/2024  Plan of Care Certification Period: 10/8/24  Date of Return to MD:  6/24/2024   Visit # / Visits authorized: 25/ 30  FOTO Function Score [id number]   53% on 6/14/2024  Functional Level Prior to Evaluation:  full use  DOS: 6/11/2024  Procedure:   Right long finger extensor tendon repair zone III, cpt 59543  2.  Right long finger tenosynovectomy EDC tendons, cpt 223090  3.  Right long finger extensor tenolysis, cpt 85810  4.  Right hand irrigation and sharp excisional debridement of skin, subcutaneous tissue, tendon and bone right long finger, cpt 50245            Precautions:  Standard   Time In:9:03 am   Time Out: 10:32 pm  Total Billable Time: 55 min       Subjective     Patient reports: doing well   He was compliant with home exercise program given last session.   Response to previous treatment: good   Functional change: able to use hand functionally at home     Pain: 0/10  Location: right hands      Objective     Objective Measures updated at progress report unless specified.  Update: Objective Measures updated at progress report unless specified.     Observation/Appearance:  Mild edema long finger, marquez at PIP remains but visibly decreased. Mild PIP flexion contracture continues.      Edema. Measured in centimeters.    6/18/2024 7/22/24      Spoke with pt and made her aware the MRI was ordered. Gave her the phone number for Central Scheduling.   "Right  Left   Long:         P1 7.6  7.4    PIP suture  7.9   P2 7.2  7.1    DIP       P3       Hand ROM. Measured in degrees.    6/18/2024 7/3/2024  7/17/2024 7/22/24 8/5/2024 8/13/2024 8/15/2024 9/5/2024      Right RIght    R              Limited by post-op restrictions                                       Long:  MP     /82 83 83 82 93 89 89              PIP   -8/ 40 degrees to limits of exercise orthosis  -5/65 -12/61 -10/69 -15/71 -8/80 -18/84 -11/83              DIP     /35 22 24 25 35 35 36              CHAN                                                 8/20/2024 8/20/2024 9/17/2024     Left   Right   Right      113, 103, 108  Avg =108 48, 50, 65  Avg = 54  55, 58, 67   Lat pinch          3pt pinch  17 16.5  21   2 pt pinch                        Treatment     Theo received the treatments listed below:    Supervised Modality:                                                      Patient received fluido therapy to left hand  with fingers taped in flexion for 10 minutes to increase blood flow, circulation, pain management and for tissue elasticity prior to therex      Therapeutic Exercise: 25 min  Reassessment rom, , pinch  Application of coban to stretch fingers into flexion during fluidotherapy  LLPS: PROM PIP extension, Isolated DIP flexion, Hook, straight fist, full fist 20+ reps each  AROM: IP joint blocking  10+ reps each  AAROM: hooks, Straight fists, full fists  10+ reps    MP flexion with WF x 10 with 5" holds - for EDC stretch (Not Today)  Composite fist with WF x 10 with 5" holds (Not Today)     Therapeutic activities x20 min        BTE Simulator II                   Tool Plane of motion and time                   504   Wrist flex, wrist ext 45 sec ea   302  Rad dev, ulnar dev 45 sec   162  x 60 sec   162 lat pinch,3-jaw pinch 45 sec ea       Pom  long finger to palm x 2 containers   Pom pick-up, green clothespin, 3-jaw, x 2 containers  Pom kicks x 3 min (Not Today)       Patient " Education and Home Exercises     Education provided:   - cont HEP  - Progress towards goals     Written Home Exercises Provided: Pt instructed to continue prior HEP.  Exercises were reviewed and Theo was able to demonstrate them prior to the end of the session.  Theo demonstrated good  understanding of the home exercise program provided. See electronic medical record under Patient Instructions for exercises provided during therapy sessions.       Assessment     Reassessment reveals increase in  and 3-jaw pinch strength. PIP extension also improved. However, IP flex is status quo. Encouraged to perform HEP 4 to 6x/day.     Theo is progressing well towards his goals and there are no updates to goals at this time. Pt prognosis is Good.     Patient will continue to benefit from skilled outpatient occupational therapy to address the deficits listed in the problem list on initial evaluation provide patient/family education and to maximize patient's level of independence in the home and community environment.     Patient's spiritual, cultural and educational needs considered and patient agreeable to plan of care and goals.    Anticipated barriers to occupational therapy: Duration and nature of condiction     Goals:     Previous Short Term Goals Status:   Met  New Short Term Goals Status = Long Term Goal Status: continue per initial plan of care.  Long Term Goals (LTGs); to be met by discharge.  LTG #1: Pt will report a pain level of 2 out of 10 with ADLs/IADLs  (MET)        LTG #2: Pt will demo improved FOTO score by 20 points.   LTG #3: Pt will return to prior level of function for ADLs /IADLs    Plan     Updates/Grading for next session: cont as tolerated     Chrissy Snowden OT   9/17/2024

## 2024-09-18 ENCOUNTER — TELEPHONE (OUTPATIENT)
Dept: INFECTIOUS DISEASES | Facility: CLINIC | Age: 54
End: 2024-09-18
Payer: COMMERCIAL

## 2024-09-18 DIAGNOSIS — L03.011 CELLULITIS OF RIGHT MIDDLE FINGER: Primary | ICD-10-CM

## 2024-09-18 NOTE — TELEPHONE ENCOUNTER
Patient said finger is doing well.  He had a UTI last week so he thinks that may have made his numbers go up

## 2024-09-19 ENCOUNTER — CLINICAL SUPPORT (OUTPATIENT)
Dept: REHABILITATION | Facility: HOSPITAL | Age: 54
End: 2024-09-19
Payer: COMMERCIAL

## 2024-09-19 DIAGNOSIS — M79.644 PAIN OF RIGHT MIDDLE FINGER: Primary | ICD-10-CM

## 2024-09-19 DIAGNOSIS — M25.641 STIFFNESS OF FINGER JOINT OF RIGHT HAND: ICD-10-CM

## 2024-09-19 PROCEDURE — 97110 THERAPEUTIC EXERCISES: CPT

## 2024-09-19 PROCEDURE — 97022 WHIRLPOOL THERAPY: CPT

## 2024-09-19 PROCEDURE — 97530 THERAPEUTIC ACTIVITIES: CPT

## 2024-09-19 NOTE — PROGRESS NOTES
[facility]  Occupational Therapy Treatment Note     Date: 9/19/2024  Name: Theo Matthew Jr.  Clinic Number: [id number]    Therapy Diagnosis:   No diagnosis found.    Physician: Maryjo Chamberlain MD    Physician Orders: Continue with ROM and strengthening  Medical Diagnosis from Referral: Medical Diagnosis: L03.011 (ICD-10-CM) - Cellulitis of right middle finger S66.929A (ICD-10-CM) - Laceration of tendon of finger M65.849 (ICD-10-CM) - Extensor tenosynovitis of finger          Evaluation Date: 6/18/2024  Insurance Authorization Period Expiration: 12/31/2024  Plan of Care Certification Period: 10/8/24  Date of Return to MD:  6/24/2024   Visit # / Visits authorized: 25/ 30  FOTO Function Score [id number]   53% on 6/14/2024  Functional Level Prior to Evaluation:  full use  DOS: 6/11/2024  Procedure:   Right long finger extensor tendon repair zone III, cpt 81010  2.  Right long finger tenosynovectomy EDC tendons, cpt 114644  3.  Right long finger extensor tenolysis, cpt 46889  4.  Right hand irrigation and sharp excisional debridement of skin, subcutaneous tissue, tendon and bone right long finger, cpt 97564     Precautions:  Standard   Time In:9:30 am   Time Out: 10:30 pm  Total Billable Time: 55 min       Subjective     Patient reports: doing well   He was compliant with home exercise program given last session.   Response to previous treatment: good   Functional change: able to use hand functionally at home     Pain: 0/10  Location: right hands      Objective     Objective Measures updated at progress report unless specified.  Update: Objective Measures updated at progress report unless specified.     Observation/Appearance:  Mild edema long finger, marquez at PIP remains but visibly decreased. Mild PIP flexion contracture continues.      Edema. Measured in centimeters.    6/18/2024 7/22/24     Right  Left   Long:         P1 7.6  7.4    PIP suture  7.9   P2 7.2  7.1    DIP       P3       Hand ROM.  Measured in degrees.    6/18/2024 7/3/2024  7/17/2024 7/22/24 8/5/24 8/13/24 8/15/24 9/5/24 9/17/24     Right RIght    R              Limited by post-op restrictions                                       Long:  MP     /82 83 83 82 93 89 89              PIP   -8/ 40 degrees to limits of exercise orthosis  -5/65 -12/61 -10/69 -15/71 -8/80 -18/84 -11/83              DIP     /35 22 24 25 35 35 36              CHAN                                                 8/20/2024 8/20/2024 9/17/2024     Left   Right   Right      113, 103, 108  Avg =108 48, 50, 65  Avg = 54  55, 58, 67  Avg = 60   Lat pinch          3pt pinch  17 16.5  21   2 pt pinch                        Treatment     Theo received the treatments listed below:    Supervised Modality:                                                      Patient received fluido therapy to left hand  with fingers taped in flexion for 10 minutes to increase blood flow, circulation, pain management and for tissue elasticity prior to therex      Therapeutic Exercise: 25 min  Application of coban to stretch fingers into flexion during fluidotherapy  LLPS: PROM PIP extension, Isolated DIP flexion, Hook, straight fist, full fist 20+ reps each  AROM: IP joint blocking  10+ reps each  AAROM: hooks, Straight fists, full fists  15+ reps    Therapeutic activities x20 min        BTE Simulator II                   Tool Plane of motion and time                   504   Wrist flex, wrist ext 45 sec ea   302  Rad dev, ulnar dev 45 sec   162  x 60 sec   162 lat pinch,3-jaw pinch 45 sec ea       Pom  long finger to palm x 3 min  Pom pick-up, green clothespin, 3-jaw, x 3 min  Patient Education and Home Exercises     Education provided:   - cont HEP  - Progress towards goals     Written Home Exercises Provided: Pt instructed to continue prior HEP.  Exercises were reviewed and Theo was able to demonstrate them prior to the end of the session.  Theo demonstrated good  understanding of  the home exercise program provided. See electronic medical record under Patient Instructions for exercises provided during therapy sessions.       Assessment     Theo is progressing well towards his goals and there are no updates to goals at this time. Pt prognosis is Good.     Patient will continue to benefit from skilled outpatient occupational therapy to address the deficits listed in the problem list on initial evaluation provide patient/family education and to maximize patient's level of independence in the home and community environment.     Patient's spiritual, cultural and educational needs considered and patient agreeable to plan of care and goals.    Anticipated barriers to occupational therapy: Duration and nature of condiction     Goals:     Previous Short Term Goals Status:   Met  New Short Term Goals Status = Long Term Goal Status: continue per initial plan of care.  Long Term Goals (LTGs); to be met by discharge.  LTG #1: Pt will report a pain level of 2 out of 10 with ADLs/IADLs  (MET)        LTG #2: Pt will demo improved FOTO score by 20 points.   LTG #3: Pt will return to prior level of function for ADLs /IADLs    Plan     Updates/Grading for next session: cont as tolerated     Chrissy Snowden OT   9/19/2024

## 2024-09-23 ENCOUNTER — OFFICE VISIT (OUTPATIENT)
Dept: INFECTIOUS DISEASES | Facility: CLINIC | Age: 54
End: 2024-09-23
Payer: COMMERCIAL

## 2024-09-23 VITALS
HEART RATE: 82 BPM | HEIGHT: 66 IN | DIASTOLIC BLOOD PRESSURE: 82 MMHG | TEMPERATURE: 100 F | BODY MASS INDEX: 29.48 KG/M2 | WEIGHT: 183.44 LBS | SYSTOLIC BLOOD PRESSURE: 148 MMHG

## 2024-09-23 DIAGNOSIS — N42.89 PROSTATE MASS: ICD-10-CM

## 2024-09-23 DIAGNOSIS — B96.5 PSEUDOMONAS ARTHRITIS: Primary | ICD-10-CM

## 2024-09-23 DIAGNOSIS — M00.80 PSEUDOMONAS ARTHRITIS: Primary | ICD-10-CM

## 2024-09-23 DIAGNOSIS — N28.9 RENAL INSUFFICIENCY: ICD-10-CM

## 2024-09-23 PROCEDURE — 3008F BODY MASS INDEX DOCD: CPT | Mod: CPTII,S$GLB,, | Performed by: INTERNAL MEDICINE

## 2024-09-23 PROCEDURE — 3077F SYST BP >= 140 MM HG: CPT | Mod: CPTII,S$GLB,, | Performed by: INTERNAL MEDICINE

## 2024-09-23 PROCEDURE — 99999 PR PBB SHADOW E&M-EST. PATIENT-LVL III: CPT | Mod: PBBFAC,,, | Performed by: INTERNAL MEDICINE

## 2024-09-23 PROCEDURE — 3079F DIAST BP 80-89 MM HG: CPT | Mod: CPTII,S$GLB,, | Performed by: INTERNAL MEDICINE

## 2024-09-23 PROCEDURE — 1159F MED LIST DOCD IN RCRD: CPT | Mod: CPTII,S$GLB,, | Performed by: INTERNAL MEDICINE

## 2024-09-23 PROCEDURE — 1160F RVW MEDS BY RX/DR IN RCRD: CPT | Mod: CPTII,S$GLB,, | Performed by: INTERNAL MEDICINE

## 2024-09-23 PROCEDURE — 99214 OFFICE O/P EST MOD 30 MIN: CPT | Mod: S$GLB,,, | Performed by: INTERNAL MEDICINE

## 2024-09-23 PROCEDURE — 3044F HG A1C LEVEL LT 7.0%: CPT | Mod: CPTII,S$GLB,, | Performed by: INTERNAL MEDICINE

## 2024-09-23 RX ORDER — CIPROFLOXACIN 500 MG/1
500 TABLET ORAL
COMMUNITY
End: 2024-09-27

## 2024-09-24 ENCOUNTER — CLINICAL SUPPORT (OUTPATIENT)
Dept: REHABILITATION | Facility: HOSPITAL | Age: 54
End: 2024-09-24
Payer: COMMERCIAL

## 2024-09-24 DIAGNOSIS — M25.641 STIFFNESS OF FINGER JOINT OF RIGHT HAND: ICD-10-CM

## 2024-09-24 DIAGNOSIS — M79.644 PAIN OF RIGHT MIDDLE FINGER: Primary | ICD-10-CM

## 2024-09-24 PROCEDURE — 97022 WHIRLPOOL THERAPY: CPT

## 2024-09-24 PROCEDURE — 97530 THERAPEUTIC ACTIVITIES: CPT

## 2024-09-24 PROCEDURE — 97110 THERAPEUTIC EXERCISES: CPT

## 2024-09-24 NOTE — PROGRESS NOTES
[facility]  Occupational Therapy Treatment Note     Date: 9/24/2024  Name: Theo Matthew Jr.  Clinic Number: [id number]    Therapy Diagnosis:   Encounter Diagnoses   Name Primary?    Pain of right middle finger Yes    Stiffness of finger joint of right hand        Physician: Maryjo Chamberlain MD    Physician Orders: Continue with ROM and strengthening  Medical Diagnosis from Referral: Medical Diagnosis: L03.011 (ICD-10-CM) - Cellulitis of right middle finger S66.929A (ICD-10-CM) - Laceration of tendon of finger M65.849 (ICD-10-CM) - Extensor tenosynovitis of finger          Evaluation Date: 6/18/2024  Insurance Authorization Period Expiration: 12/31/2024  Plan of Care Certification Period: 10/8/24  Date of Return to MD:  6/24/2024   Visit # / Visits authorized: 25/ 30  FOTO Function Score [id number]   53% on 6/14/2024  63% on 8/22/2024  67% on 9/24/2024  Functional Level Prior to Evaluation:  full use  DOS: 6/11/2024  Procedure:   Right long finger extensor tendon repair zone III, cpt 75528  2.  Right long finger tenosynovectomy EDC tendons, cpt 604628  3.  Right long finger extensor tenolysis, cpt 07866  4.  Right hand irrigation and sharp excisional debridement of skin, subcutaneous tissue, tendon and bone right long finger, cpt 25761     Precautions:  Standard   Time In:10:10 am   Time Out: 11:10 pm  Total Billable Time: 57 min       Subjective     Patient reports compliance with home exercise program and increase in rom.   Response to previous treatment: good   Functional change: able to use hand functionally at home     Pain: 0/10  Location: right hands      Objective     Objective Measures updated at progress report unless specified.  Update: Objective Measures updated at progress report unless specified.     Observation/Appearance:  Mild edema long finger, marquez at PIP remains but visibly decreased. Mild PIP flexion contracture continues.      Edema. Measured in centimeters.    6/18/2024   7/22/24     Right  Left   Long:         P1 7.6  7.4    PIP suture  7.9   P2 7.2  7.1    DIP       P3       Hand ROM. Measured in degrees.    6/18/2024 7/3/2024  7/17/2024 7/22/24 8/5/24 8/13/24 8/15/24 9/5/24 9/17/24     Right RIght    R              Limited by post-op restrictions                                       Long:  MP     /82 83 83 82 93 89 89              PIP   -8/ 40 degrees to limits of exercise orthosis  -5/65 -12/61 -10/69 -15/71 -8/80 -18/84 -11/83              DIP     /35 22 24 25 35 35 36              CHAN                                                 8/20/2024 8/20/2024 9/17/2024     Left   Right   Right      113, 103, 108  Avg =108 48, 50, 65  Avg = 54  55, 58, 67  Avg = 60   Lat pinch          3pt pinch  17 16.5  21   2 pt pinch                        Treatment     Theo received the treatments listed below:    Supervised Modality:                                                      Patient received fluido therapy to left hand  with fingers taped in flexion for 10 minutes to increase blood flow, circulation, pain management and for tissue elasticity prior to therex      Therapeutic Exercise: 30 min  Application of coban to stretch fingers into flexion during fluidotherapy  LLPS: PROM PIP extension, Isolated DIP flexion, Hook, straight fist, full fist 20+ reps each  AROM: IP joint blocking  10+ reps each  AAROM: hooks, Straight fists, full fists  15+ reps  Digi flex 7 lbs x 10 reps    Therapeutic activities x17 min        BTE Simulator II                   Tool Plane of motion and time                   504   Wrist flex, wrist ext 45 sec ea   302  Rad dev, ulnar dev 45 sec   162  x 60 sec   162 lat pinch,3-jaw pinch 45 sec ea       Pom  long finger to palm x 3 min (Not Today)  Pom pick-up, green clothespin, 3-jaw, x 3 min  Patient Education and Home Exercises     Education provided:   - cont HEP  - Progress towards goals     Written Home Exercises Provided: Pt instructed to  continue prior HEP.  Exercises were reviewed and Theo was able to demonstrate them prior to the end of the session.  Theo demonstrated good  understanding of the home exercise program provided. See electronic medical record under Patient Instructions for exercises provided during therapy sessions.       Assessment   Responding well to heat with finger stretched into flexion with coban. Active flexion notably improved though deficits remain with greatest deficits in IP flex.  Theo is progressing well towards his goals and there are no updates to goals at this time. Pt prognosis is Good.     Patient will continue to benefit from skilled outpatient occupational therapy to address the deficits listed in the problem list on initial evaluation provide patient/family education and to maximize patient's level of independence in the home and community environment.     Patient's spiritual, cultural and educational needs considered and patient agreeable to plan of care and goals.    Anticipated barriers to occupational therapy: Duration and nature of condiction     Goals:     Previous Short Term Goals Status:   Met  New Short Term Goals Status = Long Term Goal Status: continue per initial plan of care.  Long Term Goals (LTGs); to be met by discharge.  LTG #1: Pt will report a pain level of 2 out of 10 with ADLs/IADLs  (MET)        LTG #2: Pt will demo improved FOTO score by 20 points. (Progressing)  LTG #3: Pt will return to prior level of function for ADLs /IADLs (Progressing)    Plan     Updates/Grading for next session: cont as tolerated     Chrissy Snowden OT   9/24/2024

## 2024-09-25 ENCOUNTER — OFFICE VISIT (OUTPATIENT)
Dept: UROLOGY | Facility: CLINIC | Age: 54
End: 2024-09-25
Payer: COMMERCIAL

## 2024-09-25 ENCOUNTER — TELEPHONE (OUTPATIENT)
Dept: INFECTIOUS DISEASES | Facility: CLINIC | Age: 54
End: 2024-09-25
Payer: COMMERCIAL

## 2024-09-25 VITALS
BODY MASS INDEX: 29.44 KG/M2 | DIASTOLIC BLOOD PRESSURE: 86 MMHG | WEIGHT: 183.19 LBS | HEART RATE: 76 BPM | SYSTOLIC BLOOD PRESSURE: 145 MMHG | HEIGHT: 66 IN

## 2024-09-25 DIAGNOSIS — R31.0 GROSS HEMATURIA: Primary | ICD-10-CM

## 2024-09-25 LAB
BACTERIA #/AREA URNS AUTO: NORMAL /HPF
BILIRUB UR QL STRIP: NEGATIVE
CLARITY UR REFRACT.AUTO: CLEAR
COLOR UR AUTO: COLORLESS
GLUCOSE UR QL STRIP: NEGATIVE
HGB UR QL STRIP: ABNORMAL
KETONES UR QL STRIP: NEGATIVE
LEUKOCYTE ESTERASE UR QL STRIP: NEGATIVE
MICROSCOPIC COMMENT: NORMAL
NITRITE UR QL STRIP: NEGATIVE
PH UR STRIP: 6 [PH] (ref 5–8)
PROT UR QL STRIP: ABNORMAL
RBC #/AREA URNS AUTO: 0 /HPF (ref 0–4)
SP GR UR STRIP: 1 (ref 1–1.03)
URN SPEC COLLECT METH UR: ABNORMAL
WBC #/AREA URNS AUTO: 2 /HPF (ref 0–5)

## 2024-09-25 PROCEDURE — 99999 PR PBB SHADOW E&M-EST. PATIENT-LVL IV: CPT | Mod: PBBFAC,,, | Performed by: UROLOGY

## 2024-09-25 PROCEDURE — 3008F BODY MASS INDEX DOCD: CPT | Mod: CPTII,S$GLB,, | Performed by: UROLOGY

## 2024-09-25 PROCEDURE — 1159F MED LIST DOCD IN RCRD: CPT | Mod: CPTII,S$GLB,, | Performed by: UROLOGY

## 2024-09-25 PROCEDURE — 87086 URINE CULTURE/COLONY COUNT: CPT | Performed by: UROLOGY

## 2024-09-25 PROCEDURE — 81001 URINALYSIS AUTO W/SCOPE: CPT | Performed by: UROLOGY

## 2024-09-25 PROCEDURE — 3079F DIAST BP 80-89 MM HG: CPT | Mod: CPTII,S$GLB,, | Performed by: UROLOGY

## 2024-09-25 PROCEDURE — 3044F HG A1C LEVEL LT 7.0%: CPT | Mod: CPTII,S$GLB,, | Performed by: UROLOGY

## 2024-09-25 PROCEDURE — 3077F SYST BP >= 140 MM HG: CPT | Mod: CPTII,S$GLB,, | Performed by: UROLOGY

## 2024-09-25 PROCEDURE — 99215 OFFICE O/P EST HI 40 MIN: CPT | Mod: S$GLB,,, | Performed by: UROLOGY

## 2024-09-25 NOTE — H&P (VIEW-ONLY)
[facility]  Urologic Oncology      Date of Service: 09/25/2024    Chief Complaint/Reason for Consultation:  Bladder/prostate mass    Requesting Provider:   No referring provider defined for this encounter.    Urologic Oncology Problem List:  Elevated PSA with PSA up to 17 in 2022, prostate biopsy was recommended but deferred  Bilateral hydronephrosis with acute kidney injury secondary to obstructing bladder/prostate mass  Urinary tract infection in a male  Gross hematuria    History of Present Illness:   History of Present Illness    CHIEF COMPLAINT:  Chief Complaint    See below, overall patient has a history of elevated PSA and did not undergo prostate biopsy.  He now has a rising PSA, bilateral hydronephrosis, a bladder versus prostate mass with obstruction, and a distended bladder.  He has a cytology which showed suspicious for urothelial carcinoma    URINARY SYMPTOMS AND HISTORY:  He reports a history of blood in urine and a recent urinary tract infection treated with ciprofloxacin 500 mg for one week, which he completed. He denies current hematuria, straining, or incomplete emptying. He reports increased urinary frequency but no significant voiding problems since completing the antibiotic treatment.    IMAGING AND LABORATORY RESULTS:  CT revealed a bladder mass with mild swelling in both kidneys and possible prostate growth extending into the bladder base. Urine cytology results are suspicious for bladder cancer. He has elevated creatinine levels, indicating a recent decline in kidney function.    PATIENT CONCERNS:  He inquires about the potential relationship between his recent UTI, the bladder mass, and the presence of blood and blood clots in his urine. He expresses understanding of the potential connection between the UTI and the suspected bladder mass.    SOCIAL HISTORY:  He is from the [place] area.       Imaging: I have reviewed the imaging study CT urogram on 09/06/2024 personally, have  independently interpreted this study, and agree with the findings    Current Problem List:  Patient Active Problem List    Diagnosis Date Noted    Gross hematuria 09/03/2024    Elevated PSA 09/03/2024    Pain of right middle finger 06/14/2024    Stiffness of finger joint of right hand 06/14/2024    Laceration of tendon of finger 06/11/2024    Extensor tenosynovitis of finger 06/11/2024    Extensor tendon adhesions 06/11/2024    Cellulitis of right middle finger 06/03/2024    Overweight 12/16/2019    Primary osteoarthritis of both knees 12/16/2019    Mixed hyperlipidemia 12/16/2019        Allergies:  Review of patient's allergies indicates:  No Known Allergies     Medications per EMR:  (Not in a hospital admission)      Past Medical History:  Past Medical History:   Diagnosis Date    Internal hemorrhoid         Past Surgical History:  Past Surgical History:   Procedure Laterality Date    COLONOSCOPY  02/18/2020    COLONOSCOPY N/A 2/18/2020    Procedure: COLONOSCOPY/Suprep;  Surgeon: Agueda Jacques MD;  Location: [facility];  Service: Endoscopy;  Laterality: N/A;    IRRIGATION AND DEBRIDEMENT OF UPPER EXTREMITY Right 6/11/2024    Procedure: IRRIGATION AND DEBRIDEMENT, UPPER EXTREMITY;  Surgeon: Maryjo Chamberlain MD;  [facility];  Service: Orthopedics;  Laterality: Right;    KNEE LIGAMENT RECONSTRUCTION Right     REPAIR OF EXTENSOR TENDON Right 6/11/2024    Procedure: REPAIR, TENDON, EXTENSOR LONG FINGER;  Surgeon: Maryjo Chamberlain MD;  Location: [facility] OR;  Service: Orthopedics;  Laterality: Right;    TENOLYSIS, EXTENSOR, HAND OR FINGER Right 6/11/2024    Procedure: TENOLYSIS, EXTENSOR, HAND OR FINGER;  Surgeon: Maryjo Chamberlain MD;  Location: [facility] OR;  Service: Orthopedics;  Laterality: Right;    TENOSYNOVECTOMY Right 6/11/2024    Procedure: TENOSYNOVECTOMY, EXTENSOR TENDON LONG FINGER;  Surgeon: Maryjo Chamberlain MD;  Location: [facility] OR;  Service: Orthopedics;  Laterality: Right;        Family History:  Family  "History   Problem Relation Name Age of Onset    Seizures Father Theo Matthew Sr     Alcohol abuse Father Theo Matthew Sr     Hypertension Mother Mom     No Known Problems Sister      Cancer Maternal Grandmother Chen Hernandez         passed 83 pancreate cancer    Diabetes Maternal Grandfather Lory Reeves     Arthritis Paternal Grandmother Lillian Boggs     No Known Problems Paternal Grandfather          Social History:  Social History     Tobacco Use    Smoking status: Never    Smokeless tobacco: Never   Substance Use Topics    Alcohol use: No          OBJECTIVE:     Vitals:    09/25/24 1445   BP: (!) 145/86   Pulse: 76   Weight: 83.1 kg (183 lb 3.2 oz)   Height: 5' 6" (1.676 m)        Physical Exam    General: No acute distress. Nontoxic appearing.  HENT: Normocephalic. Atraumatic.  Respiratory: Normal respiratory effort. No conversational dyspnea. No audible wheezing.  Abdomen: No obvious distension.  Skin: No visible abnormalities.  Extremities: No edema upper extremities. No edema lower extremities.  Neurological: Alert and oriented x3. Normal speech.  Psychiatric: Normal mood. Normal affect. No evidence of SI.         LAB:    CBC:  Lab Results   Component Value Date    WBC 6.92 09/25/2024    HGB 12.0 (L) 09/25/2024    HCT 35.6 (L) 09/25/2024    MCV 87 09/25/2024     09/25/2024         BMP:  Lab Results   Component Value Date     09/25/2024    K 3.7 09/25/2024     09/25/2024    CO2 25 09/25/2024    BUN 16 09/25/2024    CREATININE 1.6 (H) 09/25/2024    CALCIUM 9.2 09/25/2024    ANIONGAP 11 09/25/2024    EGFRNORACEVR 50.6 (A) 09/25/2024         ASSESSMENT/PLAN:   Assessment & Plan      Assessment: 54-year-old male with elevated PSA, bladder/prostatic mass, bilateral hydronephrosis, urinary tract infection in a male, and gross hematuria    Plan:     PROSTATE CANCER (SUSPECTED):  - Elevated PSA and CT findings warrant ruling out prostate cancer.  - Scheduled cystoscopy, prostate biopsy, and " potential tumor removal for Friday.  - Explained PSA test limitations and controversies.  - Explained potential side effects of prostate biopsy (hematuria, hematochezia, hematospermia).    BLADDER CANCER (SUSPECTED):  - Suspicious urine cytology suggests possible bladder cancer.  - Discussed risks of bladder scraping procedure, including rare risk of bladder perforation.    KIDNEY ISSUES:  - Mild swelling in both kidneys, potentially due to blockage from prostate/bladder growth.  - Slight worsening of kidney function noted in recent blood test.  - Possible ureteral stent placement during procedure to protect kidney function if ureters can be identified.  - If stents cannot be placed, may need to consider percutaneous nephrostomy tubes.  - Explained purpose and temporary nature of ureteral stents.    DIAGNOSTIC APPROACH:  - Proposed aggressive diagnostic approach due to patient's overall good health.    ANESTHESIA RISKS:  - Discussed risks of general anesthesia (myocardial infarction, cerebrovascular accident, thromboembolism, pneumonia).    LABS:  - Urine test ordered to check for infection.  - Bladder scan ordered to assess bladder emptying.    FOLLOW UP:  - Contact the office by Thursday afternoon to confirm decision about Friday's procedure.  - Follow up with on-call doctor over the weekend following the procedure.  - If procedure proceeds, potential hospital admission Friday night for monitoring.     We then discussed risks, benefits, alternatives associated with this surgery, including complications, detailed below:  -General anesthesia:  Associated risks include pneumonia, cerebrovascular accident, cardiac events including myocardial infarction, pulmonary embolism, deep vein thrombosis  -TURBT, transperineal prostate biopsy, possible bilateral ureteral stent placement: We discussed complications such as urinary tract infection, dysuria, sepsis, hematuria, clot retention requiring take back to the operating  room, and bladder perforation requiring prolonged catheterization versus operative exploration in the case of a intraperitoneal perforation.  We also discussed the possibility of a 2nd operation for either high-grade disease requiring a standard of care resection versus no detrusor muscle present for which a re-resection may be needed depending on the pathology.  We discussed hematuria and discomfort due to the ureteral stents and possible nephrostomy tube placement    I spent a total of 45 minutes on the day of the visit.This includes face to face time and non-face to face time preparing to see the patient (eg, review of tests), obtaining and/or reviewing separately obtained history, documenting clinical information in the electronic or other health record, independently interpreting results and communicating results to the patient/family/caregiver, or care coordinator  This encounter was dictated and transcribed using DeepScribe and FluencyDirect, please excuse any typographical or grammatical errors.

## 2024-09-25 NOTE — PROGRESS NOTES
[facility]  Urologic Oncology      Date of Service: 09/25/2024    Chief Complaint/Reason for Consultation:  Bladder/prostate mass    Requesting Provider:   No referring provider defined for this encounter.    Urologic Oncology Problem List:  Elevated PSA with PSA up to 17 in 2022, prostate biopsy was recommended but deferred  Bilateral hydronephrosis with acute kidney injury secondary to obstructing bladder/prostate mass  Urinary tract infection in a male  Gross hematuria    History of Present Illness:   History of Present Illness    CHIEF COMPLAINT:  Chief Complaint    See below, overall patient has a history of elevated PSA and did not undergo prostate biopsy.  He now has a rising PSA, bilateral hydronephrosis, a bladder versus prostate mass with obstruction, and a distended bladder.  He has a cytology which showed suspicious for urothelial carcinoma    URINARY SYMPTOMS AND HISTORY:  He reports a history of blood in urine and a recent urinary tract infection treated with ciprofloxacin 500 mg for one week, which he completed. He denies current hematuria, straining, or incomplete emptying. He reports increased urinary frequency but no significant voiding problems since completing the antibiotic treatment.    IMAGING AND LABORATORY RESULTS:  CT revealed a bladder mass with mild swelling in both kidneys and possible prostate growth extending into the bladder base. Urine cytology results are suspicious for bladder cancer. He has elevated creatinine levels, indicating a recent decline in kidney function.    PATIENT CONCERNS:  He inquires about the potential relationship between his recent UTI, the bladder mass, and the presence of blood and blood clots in his urine. He expresses understanding of the potential connection between the UTI and the suspected bladder mass.    SOCIAL HISTORY:  He is from the [place] area.       Imaging: I have reviewed the imaging study CT urogram on 09/06/2024 personally, have  independently interpreted this study, and agree with the findings    Current Problem List:  Patient Active Problem List    Diagnosis Date Noted    Gross hematuria 09/03/2024    Elevated PSA 09/03/2024    Pain of right middle finger 06/14/2024    Stiffness of finger joint of right hand 06/14/2024    Laceration of tendon of finger 06/11/2024    Extensor tenosynovitis of finger 06/11/2024    Extensor tendon adhesions 06/11/2024    Cellulitis of right middle finger 06/03/2024    Overweight 12/16/2019    Primary osteoarthritis of both knees 12/16/2019    Mixed hyperlipidemia 12/16/2019        Allergies:  Review of patient's allergies indicates:  No Known Allergies     Medications per EMR:  (Not in a hospital admission)      Past Medical History:  Past Medical History:   Diagnosis Date    Internal hemorrhoid         Past Surgical History:  Past Surgical History:   Procedure Laterality Date    COLONOSCOPY  02/18/2020    COLONOSCOPY N/A 2/18/2020    Procedure: COLONOSCOPY/Suprep;  Surgeon: Agueda Jacques MD;  Location: [facility];  Service: Endoscopy;  Laterality: N/A;    IRRIGATION AND DEBRIDEMENT OF UPPER EXTREMITY Right 6/11/2024    Procedure: IRRIGATION AND DEBRIDEMENT, UPPER EXTREMITY;  Surgeon: Maryjo Chamberlain MD;  Location: [facility];  Service: Orthopedics;  Laterality: Right;    KNEE LIGAMENT RECONSTRUCTION Right     REPAIR OF EXTENSOR TENDON Right 6/11/2024    Procedure: REPAIR, TENDON, EXTENSOR LONG FINGER;  Surgeon: Maryjo Chamberlain MD;  Location: [facility] OR;  Service: Orthopedics;  Laterality: Right;    TENOLYSIS, EXTENSOR, HAND OR FINGER Right 6/11/2024    Procedure: TENOLYSIS, EXTENSOR, HAND OR FINGER;  Surgeon: Maryjo Chamberlain MD;  Location: [facility] OR;  Service: Orthopedics;  Laterality: Right;    TENOSYNOVECTOMY Right 6/11/2024    Procedure: TENOSYNOVECTOMY, EXTENSOR TENDON LONG FINGER;  Surgeon: Maryjo Chamberlain MD;  Location: [facility] OR;  Service: Orthopedics;  Laterality: Right;        Family History:  Family  "History   Problem Relation Name Age of Onset    Seizures Father Theo Matthew Sr     Alcohol abuse Father Theo Matthew Sr     Hypertension Mother Mom     No Known Problems Sister      Cancer Maternal Grandmother Chen Hernandez         passed 83 pancreate cancer    Diabetes Maternal Grandfather Lory Reeves     Arthritis Paternal Grandmother Lillian Boggs     No Known Problems Paternal Grandfather          Social History:  Social History     Tobacco Use    Smoking status: Never    Smokeless tobacco: Never   Substance Use Topics    Alcohol use: No          OBJECTIVE:     Vitals:    09/25/24 1445   BP: (!) 145/86   Pulse: 76   Weight: 83.1 kg (183 lb 3.2 oz)   Height: 5' 6" (1.676 m)        Physical Exam    General: No acute distress. Nontoxic appearing.  HENT: Normocephalic. Atraumatic.  Respiratory: Normal respiratory effort. No conversational dyspnea. No audible wheezing.  Abdomen: No obvious distension.  Skin: No visible abnormalities.  Extremities: No edema upper extremities. No edema lower extremities.  Neurological: Alert and oriented x3. Normal speech.  Psychiatric: Normal mood. Normal affect. No evidence of SI.         LAB:    CBC:  Lab Results   Component Value Date    WBC 6.92 09/25/2024    HGB 12.0 (L) 09/25/2024    HCT 35.6 (L) 09/25/2024    MCV 87 09/25/2024     09/25/2024         BMP:  Lab Results   Component Value Date     09/25/2024    K 3.7 09/25/2024     09/25/2024    CO2 25 09/25/2024    BUN 16 09/25/2024    CREATININE 1.6 (H) 09/25/2024    CALCIUM 9.2 09/25/2024    ANIONGAP 11 09/25/2024    EGFRNORACEVR 50.6 (A) 09/25/2024         ASSESSMENT/PLAN:   Assessment & Plan      Assessment: 54-year-old male with elevated PSA, bladder/prostatic mass, bilateral hydronephrosis, urinary tract infection in a male, and gross hematuria    Plan:     PROSTATE CANCER (SUSPECTED):  - Elevated PSA and CT findings warrant ruling out prostate cancer.  - Scheduled cystoscopy, prostate biopsy, and " potential tumor removal for Friday.  - Explained PSA test limitations and controversies.  - Explained potential side effects of prostate biopsy (hematuria, hematochezia, hematospermia).    BLADDER CANCER (SUSPECTED):  - Suspicious urine cytology suggests possible bladder cancer.  - Discussed risks of bladder scraping procedure, including rare risk of bladder perforation.    KIDNEY ISSUES:  - Mild swelling in both kidneys, potentially due to blockage from prostate/bladder growth.  - Slight worsening of kidney function noted in recent blood test.  - Possible ureteral stent placement during procedure to protect kidney function if ureters can be identified.  - If stents cannot be placed, may need to consider percutaneous nephrostomy tubes.  - Explained purpose and temporary nature of ureteral stents.    DIAGNOSTIC APPROACH:  - Proposed aggressive diagnostic approach due to patient's overall good health.    ANESTHESIA RISKS:  - Discussed risks of general anesthesia (myocardial infarction, cerebrovascular accident, thromboembolism, pneumonia).    LABS:  - Urine test ordered to check for infection.  - Bladder scan ordered to assess bladder emptying.    FOLLOW UP:  - Contact the office by Thursday afternoon to confirm decision about Friday's procedure.  - Follow up with on-call doctor over the weekend following the procedure.  - If procedure proceeds, potential hospital admission Friday night for monitoring.     We then discussed risks, benefits, alternatives associated with this surgery, including complications, detailed below:  -General anesthesia:  Associated risks include pneumonia, cerebrovascular accident, cardiac events including myocardial infarction, pulmonary embolism, deep vein thrombosis  -TURBT, transperineal prostate biopsy, possible bilateral ureteral stent placement: We discussed complications such as urinary tract infection, dysuria, sepsis, hematuria, clot retention requiring take back to the operating  room, and bladder perforation requiring prolonged catheterization versus operative exploration in the case of a intraperitoneal perforation.  We also discussed the possibility of a 2nd operation for either high-grade disease requiring a standard of care resection versus no detrusor muscle present for which a re-resection may be needed depending on the pathology.  We discussed hematuria and discomfort due to the ureteral stents and possible nephrostomy tube placement    I spent a total of 45 minutes on the day of the visit.This includes face to face time and non-face to face time preparing to see the patient (eg, review of tests), obtaining and/or reviewing separately obtained history, documenting clinical information in the electronic or other health record, independently interpreting results and communicating results to the patient/family/caregiver, or care coordinator  This encounter was dictated and transcribed using DeepScribe and FluencyDirect, please excuse any typographical or grammatical errors.

## 2024-09-26 ENCOUNTER — CLINICAL SUPPORT (OUTPATIENT)
Dept: REHABILITATION | Facility: HOSPITAL | Age: 54
End: 2024-09-26
Payer: COMMERCIAL

## 2024-09-26 ENCOUNTER — TELEPHONE (OUTPATIENT)
Dept: UROLOGY | Facility: CLINIC | Age: 54
End: 2024-09-26
Payer: COMMERCIAL

## 2024-09-26 ENCOUNTER — LAB VISIT (OUTPATIENT)
Dept: LAB | Facility: HOSPITAL | Age: 54
End: 2024-09-26
Attending: INTERNAL MEDICINE
Payer: COMMERCIAL

## 2024-09-26 DIAGNOSIS — M79.644 PAIN OF RIGHT MIDDLE FINGER: Primary | ICD-10-CM

## 2024-09-26 DIAGNOSIS — B96.5 PSEUDOMONAS ARTHRITIS: ICD-10-CM

## 2024-09-26 DIAGNOSIS — M00.80 PSEUDOMONAS ARTHRITIS: ICD-10-CM

## 2024-09-26 DIAGNOSIS — M25.641 STIFFNESS OF FINGER JOINT OF RIGHT HAND: ICD-10-CM

## 2024-09-26 DIAGNOSIS — S66.929A LACERATION OF TENDON OF FINGER: ICD-10-CM

## 2024-09-26 DIAGNOSIS — L03.011 CELLULITIS OF RIGHT MIDDLE FINGER: ICD-10-CM

## 2024-09-26 LAB
ALBUMIN SERPL BCP-MCNC: 3.9 G/DL (ref 3.5–5.2)
ALBUMIN SERPL BCP-MCNC: 3.9 G/DL (ref 3.5–5.2)
ALP SERPL-CCNC: 55 U/L (ref 55–135)
ALP SERPL-CCNC: 55 U/L (ref 55–135)
ALT SERPL W/O P-5'-P-CCNC: 66 U/L (ref 10–44)
ALT SERPL W/O P-5'-P-CCNC: 66 U/L (ref 10–44)
ANION GAP SERPL CALC-SCNC: 10 MMOL/L (ref 8–16)
ANION GAP SERPL CALC-SCNC: 10 MMOL/L (ref 8–16)
AST SERPL-CCNC: 42 U/L (ref 10–40)
AST SERPL-CCNC: 42 U/L (ref 10–40)
BASOPHILS # BLD AUTO: 0.04 K/UL (ref 0–0.2)
BASOPHILS NFR BLD: 0.5 % (ref 0–1.9)
BILIRUB SERPL-MCNC: 0.6 MG/DL (ref 0.1–1)
BILIRUB SERPL-MCNC: 0.6 MG/DL (ref 0.1–1)
BUN SERPL-MCNC: 15 MG/DL (ref 6–20)
BUN SERPL-MCNC: 15 MG/DL (ref 6–20)
CALCIUM SERPL-MCNC: 9.6 MG/DL (ref 8.7–10.5)
CALCIUM SERPL-MCNC: 9.6 MG/DL (ref 8.7–10.5)
CHLORIDE SERPL-SCNC: 109 MMOL/L (ref 95–110)
CHLORIDE SERPL-SCNC: 109 MMOL/L (ref 95–110)
CO2 SERPL-SCNC: 29 MMOL/L (ref 23–29)
CO2 SERPL-SCNC: 29 MMOL/L (ref 23–29)
CREAT SERPL-MCNC: 1.5 MG/DL (ref 0.5–1.4)
CREAT SERPL-MCNC: 1.5 MG/DL (ref 0.5–1.4)
DIFFERENTIAL METHOD BLD: ABNORMAL
EOSINOPHIL # BLD AUTO: 0.1 K/UL (ref 0–0.5)
EOSINOPHIL NFR BLD: 1 % (ref 0–8)
ERYTHROCYTE [DISTWIDTH] IN BLOOD BY AUTOMATED COUNT: 13.5 % (ref 11.5–14.5)
ERYTHROCYTE [SEDIMENTATION RATE] IN BLOOD BY PHOTOMETRIC METHOD: 31 MM/HR (ref 0–23)
EST. GFR  (NO RACE VARIABLE): 54.6 ML/MIN/1.73 M^2
EST. GFR  (NO RACE VARIABLE): 54.6 ML/MIN/1.73 M^2
GLUCOSE SERPL-MCNC: 97 MG/DL (ref 70–110)
GLUCOSE SERPL-MCNC: 97 MG/DL (ref 70–110)
HCT VFR BLD AUTO: 38.9 % (ref 40–54)
HGB BLD-MCNC: 12.8 G/DL (ref 14–18)
IMM GRANULOCYTES # BLD AUTO: 0.02 K/UL (ref 0–0.04)
IMM GRANULOCYTES NFR BLD AUTO: 0.3 % (ref 0–0.5)
LYMPHOCYTES # BLD AUTO: 3.2 K/UL (ref 1–4.8)
LYMPHOCYTES NFR BLD: 43.7 % (ref 18–48)
MCH RBC QN AUTO: 29.4 PG (ref 27–31)
MCHC RBC AUTO-ENTMCNC: 32.9 G/DL (ref 32–36)
MCV RBC AUTO: 89 FL (ref 82–98)
MONOCYTES # BLD AUTO: 0.6 K/UL (ref 0.3–1)
MONOCYTES NFR BLD: 7.5 % (ref 4–15)
NEUTROPHILS # BLD AUTO: 3.5 K/UL (ref 1.8–7.7)
NEUTROPHILS NFR BLD: 47 % (ref 38–73)
NRBC BLD-RTO: 0 /100 WBC
PLATELET # BLD AUTO: 163 K/UL (ref 150–450)
PMV BLD AUTO: 13.4 FL (ref 9.2–12.9)
POTASSIUM SERPL-SCNC: 4 MMOL/L (ref 3.5–5.1)
POTASSIUM SERPL-SCNC: 4 MMOL/L (ref 3.5–5.1)
PROT SERPL-MCNC: 7.6 G/DL (ref 6–8.4)
PROT SERPL-MCNC: 7.6 G/DL (ref 6–8.4)
RBC # BLD AUTO: 4.36 M/UL (ref 4.6–6.2)
SODIUM SERPL-SCNC: 148 MMOL/L (ref 136–145)
SODIUM SERPL-SCNC: 148 MMOL/L (ref 136–145)
WBC # BLD AUTO: 7.34 K/UL (ref 3.9–12.7)

## 2024-09-26 PROCEDURE — 85025 COMPLETE CBC W/AUTO DIFF WBC: CPT | Performed by: INTERNAL MEDICINE

## 2024-09-26 PROCEDURE — 97110 THERAPEUTIC EXERCISES: CPT

## 2024-09-26 PROCEDURE — 85652 RBC SED RATE AUTOMATED: CPT | Performed by: INTERNAL MEDICINE

## 2024-09-26 PROCEDURE — 80053 COMPREHEN METABOLIC PANEL: CPT | Performed by: INTERNAL MEDICINE

## 2024-09-26 PROCEDURE — 97530 THERAPEUTIC ACTIVITIES: CPT

## 2024-09-26 PROCEDURE — 97022 WHIRLPOOL THERAPY: CPT

## 2024-09-26 PROCEDURE — 36415 COLL VENOUS BLD VENIPUNCTURE: CPT | Performed by: INTERNAL MEDICINE

## 2024-09-26 RX ORDER — CIPROFLOXACIN 2 MG/ML
400 INJECTION, SOLUTION INTRAVENOUS
Status: CANCELLED | OUTPATIENT
Start: 2024-09-26

## 2024-09-26 NOTE — PRE-PROCEDURE INSTRUCTIONS
PreOp Instructions given:   - Verbal medication information (what to hold and what to take)   - NPO guidelines 8  Hrs prior no solid food, milk or milk products, no cloudy liquids. You may only have water w/any medications and they should be taken at least 1 hour prior to your arrival time - unless specified/Surgeon's office.  - Arrival place directions given; time to be given the day before procedure by the   Surgeon's Office   - Bathing with antibacterial soap   - Don't wear any jewelry or bring any valuables AM of surgery   - No makeup or moisturizer to face   - No perfume/cologne, powder, lotions or aftershave   Pt. verbalized understanding.   Pt denies any h/o Anesthesia/Sedation complications or side effects.  Patient does not know arrival time.  Explained that this information comes from the surgeon's office and if they haven't heard from them by 2 or 3 pm to call the office.  Patient stated an understanding.

## 2024-09-26 NOTE — PROGRESS NOTES
[facility]  Occupational Therapy Treatment Note     Date: 9/26/2024  Name: Theo Matthew Jr.  Clinic Number: [id number]    Therapy Diagnosis:   No diagnosis found.      Physician: Maryjo Chamberlain MD    Physician Orders: Continue with ROM and strengthening  Medical Diagnosis from Referral: Medical Diagnosis: L03.011 (ICD-10-CM) - Cellulitis of right middle finger S66.929A (ICD-10-CM) - Laceration of tendon of finger M65.849 (ICD-10-CM) - Extensor tenosynovitis of finger          Evaluation Date: 6/18/2024  Insurance Authorization Period Expiration: 12/31/2024  Plan of Care Certification Period: 10/8/24  Date of Return to MD:  6/24/2024   Visit # / Visits authorized: 25/ 30  FOTO Function Score [id number]   53% on 6/14/2024  63% on 8/22/2024  67% on 9/24/2024  Functional Level Prior to Evaluation:  full use  DOS: 6/11/2024  Procedure:   Right long finger extensor tendon repair zone III, cpt 54334  2.  Right long finger tenosynovectomy EDC tendons, cpt 868814  3.  Right long finger extensor tenolysis, cpt 31235  4.  Right hand irrigation and sharp excisional debridement of skin, subcutaneous tissue, tendon and bone right long finger, cpt 88178     Precautions:  Standard   Time In:10:00 am   Time Out: 10:58 pm  Total Billable Time: 55 min       Subjective     Patient reports compliance with home exercise program and increase in rom.   Response to previous treatment: good   Functional change: able to use hand functionally at home     Pain: 0/10  Location: right hands      Objective     Objective Measures updated at progress report unless specified.  Update: Objective Measures updated at progress report unless specified.     Observation/Appearance:  Mild edema long finger, marquez at PIP remains but visibly decreased. Mild PIP flexion contracture continues.      Edema. Measured in centimeters.    6/18/2024 7/22/24     Right  Left   Long:         P1 7.6  7.4    PIP suture  7.9   P2 7.2  7.1    DIP        P3       Hand ROM. Measured in degrees.    6/18/2024 7/3/2024  7/17/2024 7/22/24 8/5/24 8/13/24 8/15/24 9/5/24 9/17/24     Right RIght    R              Limited by post-op restrictions                                       Long:  MP     /82 83 83 82 93 89 89              PIP   -8/ 40 degrees to limits of exercise orthosis  -5/65 -12/61 -10/69 -15/71 -8/80 -18/84 -11/83              DIP     /35 22 24 25 35 35 36              HCAN                                                 8/20/2024 8/20/2024 9/17/2024     Left   Right   Right      113, 103, 108  Avg =108 48, 50, 65  Avg = 54  55, 58, 67  Avg = 60   Lat pinch          3pt pinch  17 16.5  21   2 pt pinch                        Treatment     Theo received the treatments listed below:    Supervised Modality:                                                      Patient received fluido therapy to left hand  with fingers taped in flexion for 10 minutes to increase blood flow, circulation, pain management and for tissue elasticity prior to therex      Therapeutic Exercise: 30 min  Application of coban to stretch fingers into flexion during fluidotherapy  LLPS: PROM PIP extension, Isolated DIP flexion, Hook, straight fist, full fist 20+ reps each  AROM: IP joint blocking  10+ reps each  AAROM: hooks, Straight fists, full fists  15+ reps  Digi flex 7 lbs x 10 reps (Not Today)    Therapeutic activities x15 min        BTE Simulator II                   Tool Plane of motion and time                   504   Wrist flex, wrist ext 45 sec ea   302  Rad dev, ulnar dev 45 sec   162  x 60 sec   162 lat pinch,3-jaw pinch 45 sec ea       Pom  long finger to palm x 3 min (Not Today)  Pom pick-up, blue clothespin, 3-jaw, 2 containers  Patient Education and Home Exercises     Education provided:   - cont HEP  - Progress towards goals     Written Home Exercises Provided: Pt instructed to continue prior HEP.  Exercises were reviewed and Theo was able to demonstrate them  prior to the end of the session.  Theo demonstrated good  understanding of the home exercise program provided. See electronic medical record under Patient Instructions for exercises provided during therapy sessions.       Assessment     Theo is progressing well towards his goals and there are no updates to goals at this time. Pt prognosis is Good.     Patient will continue to benefit from skilled outpatient occupational therapy to address the deficits listed in the problem list on initial evaluation provide patient/family education and to maximize patient's level of independence in the home and community environment.     Patient's spiritual, cultural and educational needs considered and patient agreeable to plan of care and goals.    Anticipated barriers to occupational therapy: Duration and nature of condiction     Goals:     Previous Short Term Goals Status:   Met  New Short Term Goals Status = Long Term Goal Status: continue per initial plan of care.  Long Term Goals (LTGs); to be met by discharge.  LTG #1: Pt will report a pain level of 2 out of 10 with ADLs/IADLs  (MET)        LTG #2: Pt will demo improved FOTO score by 20 points. (Progressing)  LTG #3: Pt will return to prior level of function for ADLs /IADLs (Progressing)    Plan     Updates/Grading for next session: cont as tolerated     Chrissy Snowden OT   9/26/2024

## 2024-09-26 NOTE — TELEPHONE ENCOUNTER
Instructions for Day of Surgery at [facility]      Report To:    The [facility], located across from [place] side of the 1st floor of the hospital    Arrival time: 1200    Please note:     If you are allergic to any medication please let your care team know the day of surgery.  If you have ever had adverse reaction to anesthesia please let your care team know the day of surgery   Please arrange transportation from the hospital, you will not be allowed to drive yourself home from surgery since you have been under sedation or anesthesia   Notify your doctor or care team of any diabetic medications that you take as these may need to be held or adjusted prior to surgery     Before Surgery     You should stop taking any blood thinners for up to 7 days depending on the blood thinner, please let care team know what you are taking so you can be directed when to stop certain medications.    You should hold any aspirin containing products for 7 days    Stop taking any herbal supplements 14 days prior to surgery     Night Before Surgery     Nothing to eat or drink after midnight the night before your surgery  Take medications as instructed the morning of surgery  No alcoholic beverages 24 hours prior to surgery

## 2024-09-27 ENCOUNTER — TELEPHONE (OUTPATIENT)
Dept: ORTHOPEDICS | Facility: CLINIC | Age: 54
End: 2024-09-27
Payer: COMMERCIAL

## 2024-09-27 ENCOUNTER — ANESTHESIA EVENT (OUTPATIENT)
Dept: SURGERY | Facility: HOSPITAL | Age: 54
End: 2024-09-27
Payer: COMMERCIAL

## 2024-09-27 ENCOUNTER — ANESTHESIA (OUTPATIENT)
Dept: SURGERY | Facility: HOSPITAL | Age: 54
End: 2024-09-27
Payer: COMMERCIAL

## 2024-09-27 ENCOUNTER — HOSPITAL ENCOUNTER (OUTPATIENT)
Facility: HOSPITAL | Age: 54
Discharge: HOME | End: 2024-09-28
Attending: UROLOGY | Admitting: UROLOGY
Payer: COMMERCIAL

## 2024-09-27 ENCOUNTER — TELEPHONE (OUTPATIENT)
Dept: INFECTIOUS DISEASES | Facility: CLINIC | Age: 54
End: 2024-09-27
Payer: COMMERCIAL

## 2024-09-27 DIAGNOSIS — L03.011 CELLULITIS OF RIGHT MIDDLE FINGER: Primary | ICD-10-CM

## 2024-09-27 DIAGNOSIS — N32.89 BLADDER MASS: Primary | ICD-10-CM

## 2024-09-27 PROBLEM — N42.89 PROSTATE MASS: Status: ACTIVE | Noted: 2024-09-27

## 2024-09-27 LAB — BACTERIA UR CULT: NORMAL

## 2024-09-27 PROCEDURE — 88341 IMHCHEM/IMCYTCHM EA ADD ANTB: CPT | Mod: 59 | Performed by: PATHOLOGY

## 2024-09-27 PROCEDURE — 88342 IMHCHEM/IMCYTCHM 1ST ANTB: CPT | Performed by: PATHOLOGY

## 2024-09-27 PROCEDURE — 25000003 PHARM REV CODE 250

## 2024-09-27 PROCEDURE — 63600175 PHARM REV CODE 636 W HCPCS

## 2024-09-27 PROCEDURE — 36000707: Performed by: UROLOGY

## 2024-09-27 PROCEDURE — 88344 IMHCHEM/IMCYTCHM EA MLT ANTB: CPT | Mod: 59 | Performed by: PATHOLOGY

## 2024-09-27 PROCEDURE — 25000003 PHARM REV CODE 250: Performed by: UROLOGY

## 2024-09-27 PROCEDURE — 71000039 HC RECOVERY, EACH ADD'L HOUR: Performed by: UROLOGY

## 2024-09-27 PROCEDURE — D9220A PRA ANESTHESIA: Mod: ANES,,, | Performed by: ANESTHESIOLOGY

## 2024-09-27 PROCEDURE — 55899 UNLISTED PX MALE GENITAL SYS: CPT | Mod: ,,, | Performed by: UROLOGY

## 2024-09-27 PROCEDURE — 71000033 HC RECOVERY, INTIAL HOUR: Performed by: UROLOGY

## 2024-09-27 PROCEDURE — 55700 PR BIOPSY OF PROSTATE,NEEDLE/PUNCH: CPT | Mod: 51,,, | Performed by: UROLOGY

## 2024-09-27 PROCEDURE — 88342 IMHCHEM/IMCYTCHM 1ST ANTB: CPT | Mod: 26,59,, | Performed by: PATHOLOGY

## 2024-09-27 PROCEDURE — 76872 US TRANSRECTAL: CPT | Mod: 26,,, | Performed by: UROLOGY

## 2024-09-27 PROCEDURE — 27201423 OPTIME MED/SURG SUP & DEVICES STERILE SUPPLY: Performed by: UROLOGY

## 2024-09-27 PROCEDURE — 37000009 HC ANESTHESIA EA ADD 15 MINS: Performed by: UROLOGY

## 2024-09-27 PROCEDURE — 71000016 HC POSTOP RECOV ADDL HR: Performed by: UROLOGY

## 2024-09-27 PROCEDURE — G0378 HOSPITAL OBSERVATION PER HR: HCPCS

## 2024-09-27 PROCEDURE — D9220A PRA ANESTHESIA: Mod: CRNA,,,

## 2024-09-27 PROCEDURE — 52332 CYSTOSCOPY AND TREATMENT: CPT | Mod: 50,,, | Performed by: UROLOGY

## 2024-09-27 PROCEDURE — 88344 IMHCHEM/IMCYTCHM EA MLT ANTB: CPT | Mod: 26,,, | Performed by: PATHOLOGY

## 2024-09-27 PROCEDURE — 88341 IMHCHEM/IMCYTCHM EA ADD ANTB: CPT | Mod: 26,,, | Performed by: PATHOLOGY

## 2024-09-27 PROCEDURE — C1758 CATHETER, URETERAL: HCPCS | Performed by: UROLOGY

## 2024-09-27 PROCEDURE — 36000706: Performed by: UROLOGY

## 2024-09-27 PROCEDURE — 88305 TISSUE EXAM BY PATHOLOGIST: CPT | Mod: 26,,, | Performed by: PATHOLOGY

## 2024-09-27 PROCEDURE — 71000015 HC POSTOP RECOV 1ST HR: Performed by: UROLOGY

## 2024-09-27 PROCEDURE — C1769 GUIDE WIRE: HCPCS | Performed by: UROLOGY

## 2024-09-27 PROCEDURE — 37000008 HC ANESTHESIA 1ST 15 MINUTES: Performed by: UROLOGY

## 2024-09-27 PROCEDURE — C2617 STENT, NON-COR, TEM W/O DEL: HCPCS | Performed by: UROLOGY

## 2024-09-27 PROCEDURE — 88305 TISSUE EXAM BY PATHOLOGIST: CPT | Performed by: PATHOLOGY

## 2024-09-27 DEVICE — STENT URETERAL UNIV 6FR 28CM: Type: IMPLANTABLE DEVICE | Site: URETER | Status: FUNCTIONAL

## 2024-09-27 RX ORDER — OXYCODONE HYDROCHLORIDE 5 MG/1
5 TABLET ORAL EVERY 4 HOURS PRN
Status: DISCONTINUED | OUTPATIENT
Start: 2024-09-27 | End: 2024-09-28 | Stop reason: HOSPADM

## 2024-09-27 RX ORDER — GLUCAGON 1 MG
1 KIT INJECTION
Status: DISCONTINUED | OUTPATIENT
Start: 2024-09-27 | End: 2024-09-27 | Stop reason: HOSPADM

## 2024-09-27 RX ORDER — ACETAMINOPHEN 10 MG/ML
INJECTION, SOLUTION INTRAVENOUS
Status: DISCONTINUED | OUTPATIENT
Start: 2024-09-27 | End: 2024-09-27

## 2024-09-27 RX ORDER — ACETAMINOPHEN 500 MG
1000 TABLET ORAL EVERY 6 HOURS
Status: DISCONTINUED | OUTPATIENT
Start: 2024-09-27 | End: 2024-09-28 | Stop reason: HOSPADM

## 2024-09-27 RX ORDER — FENTANYL CITRATE 50 UG/ML
25 INJECTION, SOLUTION INTRAMUSCULAR; INTRAVENOUS EVERY 5 MIN PRN
Status: DISCONTINUED | OUTPATIENT
Start: 2024-09-27 | End: 2024-09-27 | Stop reason: HOSPADM

## 2024-09-27 RX ORDER — OXYCODONE HYDROCHLORIDE 5 MG/1
5 TABLET ORAL
Status: DISCONTINUED | OUTPATIENT
Start: 2024-09-27 | End: 2024-09-27 | Stop reason: HOSPADM

## 2024-09-27 RX ORDER — SODIUM CHLORIDE, SODIUM LACTATE, POTASSIUM CHLORIDE, CALCIUM CHLORIDE 600; 310; 30; 20 MG/100ML; MG/100ML; MG/100ML; MG/100ML
INJECTION, SOLUTION INTRAVENOUS CONTINUOUS
Status: DISCONTINUED | OUTPATIENT
Start: 2024-09-27 | End: 2024-09-28 | Stop reason: HOSPADM

## 2024-09-27 RX ORDER — SODIUM CHLORIDE 0.9 % (FLUSH) 0.9 %
10 SYRINGE (ML) INJECTION
Status: DISCONTINUED | OUTPATIENT
Start: 2024-09-27 | End: 2024-09-28 | Stop reason: HOSPADM

## 2024-09-27 RX ORDER — SODIUM CHLORIDE 9 MG/ML
INJECTION, SOLUTION INTRAVENOUS CONTINUOUS
Status: DISCONTINUED | OUTPATIENT
Start: 2024-09-27 | End: 2024-09-28 | Stop reason: HOSPADM

## 2024-09-27 RX ORDER — PROPOFOL 10 MG/ML
VIAL (ML) INTRAVENOUS
Status: DISCONTINUED | OUTPATIENT
Start: 2024-09-27 | End: 2024-09-27

## 2024-09-27 RX ORDER — KETAMINE HCL IN 0.9 % NACL 50 MG/5 ML
SYRINGE (ML) INTRAVENOUS
Status: DISCONTINUED | OUTPATIENT
Start: 2024-09-27 | End: 2024-09-27

## 2024-09-27 RX ORDER — MIDAZOLAM HYDROCHLORIDE 1 MG/ML
INJECTION INTRAMUSCULAR; INTRAVENOUS
Status: DISCONTINUED | OUTPATIENT
Start: 2024-09-27 | End: 2024-09-27

## 2024-09-27 RX ORDER — LIDOCAINE HYDROCHLORIDE 20 MG/ML
INJECTION, SOLUTION EPIDURAL; INFILTRATION; INTRACAUDAL; PERINEURAL
Status: DISCONTINUED | OUTPATIENT
Start: 2024-09-27 | End: 2024-09-27

## 2024-09-27 RX ORDER — ROCURONIUM BROMIDE 10 MG/ML
INJECTION, SOLUTION INTRAVENOUS
Status: DISCONTINUED | OUTPATIENT
Start: 2024-09-27 | End: 2024-09-27

## 2024-09-27 RX ORDER — SODIUM CHLORIDE 0.9 % (FLUSH) 0.9 %
10 SYRINGE (ML) INJECTION
Status: DISCONTINUED | OUTPATIENT
Start: 2024-09-27 | End: 2024-09-27 | Stop reason: HOSPADM

## 2024-09-27 RX ORDER — LIDOCAINE HYDROCHLORIDE 20 MG/ML
JELLY TOPICAL
Status: DISCONTINUED | OUTPATIENT
Start: 2024-09-27 | End: 2024-09-27 | Stop reason: HOSPADM

## 2024-09-27 RX ORDER — PROCHLORPERAZINE EDISYLATE 5 MG/ML
5 INJECTION INTRAMUSCULAR; INTRAVENOUS EVERY 6 HOURS PRN
Status: DISCONTINUED | OUTPATIENT
Start: 2024-09-27 | End: 2024-09-28 | Stop reason: HOSPADM

## 2024-09-27 RX ORDER — TALC
6 POWDER (GRAM) TOPICAL NIGHTLY PRN
Status: DISCONTINUED | OUTPATIENT
Start: 2024-09-27 | End: 2024-09-28 | Stop reason: HOSPADM

## 2024-09-27 RX ORDER — LABETALOL HYDROCHLORIDE 5 MG/ML
INJECTION, SOLUTION INTRAVENOUS
Status: DISCONTINUED | OUTPATIENT
Start: 2024-09-27 | End: 2024-09-27

## 2024-09-27 RX ORDER — DEXMEDETOMIDINE HYDROCHLORIDE 100 UG/ML
INJECTION, SOLUTION INTRAVENOUS
Status: DISCONTINUED | OUTPATIENT
Start: 2024-09-27 | End: 2024-09-27

## 2024-09-27 RX ORDER — OXYBUTYNIN CHLORIDE 5 MG/5ML
5 SYRUP ORAL 3 TIMES DAILY
Status: DISCONTINUED | OUTPATIENT
Start: 2024-09-27 | End: 2024-09-28 | Stop reason: HOSPADM

## 2024-09-27 RX ORDER — PHENAZOPYRIDINE HYDROCHLORIDE 100 MG/1
200 TABLET, FILM COATED ORAL 3 TIMES DAILY PRN
Status: DISCONTINUED | OUTPATIENT
Start: 2024-09-27 | End: 2024-09-28 | Stop reason: HOSPADM

## 2024-09-27 RX ORDER — ONDANSETRON HYDROCHLORIDE 2 MG/ML
INJECTION, SOLUTION INTRAVENOUS
Status: DISCONTINUED | OUTPATIENT
Start: 2024-09-27 | End: 2024-09-27

## 2024-09-27 RX ORDER — HALOPERIDOL 5 MG/ML
0.5 INJECTION INTRAMUSCULAR EVERY 10 MIN PRN
Status: DISCONTINUED | OUTPATIENT
Start: 2024-09-27 | End: 2024-09-27 | Stop reason: HOSPADM

## 2024-09-27 RX ORDER — DEXAMETHASONE SODIUM PHOSPHATE 4 MG/ML
INJECTION, SOLUTION INTRA-ARTICULAR; INTRALESIONAL; INTRAMUSCULAR; INTRAVENOUS; SOFT TISSUE
Status: DISCONTINUED | OUTPATIENT
Start: 2024-09-27 | End: 2024-09-27

## 2024-09-27 RX ORDER — HYDROMORPHONE HYDROCHLORIDE 1 MG/ML
0.2 INJECTION, SOLUTION INTRAMUSCULAR; INTRAVENOUS; SUBCUTANEOUS EVERY 5 MIN PRN
Status: DISCONTINUED | OUTPATIENT
Start: 2024-09-27 | End: 2024-09-27 | Stop reason: HOSPADM

## 2024-09-27 RX ORDER — HALOPERIDOL 5 MG/ML
0.5 INJECTION INTRAMUSCULAR EVERY 10 MIN PRN
Status: DISCONTINUED | OUTPATIENT
Start: 2024-09-27 | End: 2024-09-27

## 2024-09-27 RX ORDER — OXYCODONE HYDROCHLORIDE 10 MG/1
10 TABLET ORAL EVERY 4 HOURS PRN
Status: DISCONTINUED | OUTPATIENT
Start: 2024-09-27 | End: 2024-09-28 | Stop reason: HOSPADM

## 2024-09-27 RX ORDER — HYDROMORPHONE HYDROCHLORIDE 1 MG/ML
0.5 INJECTION, SOLUTION INTRAMUSCULAR; INTRAVENOUS; SUBCUTANEOUS
Status: DISCONTINUED | OUTPATIENT
Start: 2024-09-27 | End: 2024-09-28 | Stop reason: HOSPADM

## 2024-09-27 RX ORDER — ONDANSETRON HYDROCHLORIDE 2 MG/ML
4 INJECTION, SOLUTION INTRAVENOUS EVERY 6 HOURS PRN
Status: DISCONTINUED | OUTPATIENT
Start: 2024-09-27 | End: 2024-09-28 | Stop reason: HOSPADM

## 2024-09-27 RX ORDER — FENTANYL CITRATE 50 UG/ML
INJECTION, SOLUTION INTRAMUSCULAR; INTRAVENOUS
Status: DISCONTINUED | OUTPATIENT
Start: 2024-09-27 | End: 2024-09-27

## 2024-09-27 RX ADMIN — SODIUM CHLORIDE: 9 INJECTION, SOLUTION INTRAVENOUS at 02:09

## 2024-09-27 RX ADMIN — GLYCOPYRROLATE 0.1 MG: 0.2 INJECTION, SOLUTION INTRAMUSCULAR; INTRAVENOUS at 03:09

## 2024-09-27 RX ADMIN — ACETAMINOPHEN 1000 MG: 10 INJECTION INTRAVENOUS at 04:09

## 2024-09-27 RX ADMIN — DEXMEDETOMIDINE 20 MCG: 100 INJECTION, SOLUTION, CONCENTRATE INTRAVENOUS at 02:09

## 2024-09-27 RX ADMIN — FENTANYL CITRATE 100 MCG: 50 INJECTION, SOLUTION INTRAMUSCULAR; INTRAVENOUS at 02:09

## 2024-09-27 RX ADMIN — FENTANYL CITRATE 25 MCG: 50 INJECTION, SOLUTION INTRAMUSCULAR; INTRAVENOUS at 04:09

## 2024-09-27 RX ADMIN — PROPOFOL 150 MG: 10 INJECTION, EMULSION INTRAVENOUS at 02:09

## 2024-09-27 RX ADMIN — DEXMEDETOMIDINE 8 MCG: 100 INJECTION, SOLUTION, CONCENTRATE INTRAVENOUS at 02:09

## 2024-09-27 RX ADMIN — ROCURONIUM BROMIDE 10 MG: 10 INJECTION, SOLUTION INTRAVENOUS at 02:09

## 2024-09-27 RX ADMIN — ACETAMINOPHEN 1000 MG: 500 TABLET ORAL at 06:09

## 2024-09-27 RX ADMIN — SODIUM CHLORIDE, POTASSIUM CHLORIDE, SODIUM LACTATE AND CALCIUM CHLORIDE: 600; 310; 30; 20 INJECTION, SOLUTION INTRAVENOUS at 05:09

## 2024-09-27 RX ADMIN — ONDANSETRON 4 MG: 2 INJECTION INTRAMUSCULAR; INTRAVENOUS at 04:09

## 2024-09-27 RX ADMIN — FENTANYL CITRATE 25 MCG: 50 INJECTION, SOLUTION INTRAMUSCULAR; INTRAVENOUS at 03:09

## 2024-09-27 RX ADMIN — ROCURONIUM BROMIDE 20 MG: 10 INJECTION, SOLUTION INTRAVENOUS at 04:09

## 2024-09-27 RX ADMIN — FENTANYL CITRATE 50 MCG: 50 INJECTION, SOLUTION INTRAMUSCULAR; INTRAVENOUS at 03:09

## 2024-09-27 RX ADMIN — DEXAMETHASONE SODIUM PHOSPHATE 4 MG: 4 INJECTION, SOLUTION INTRAMUSCULAR; INTRAVENOUS at 02:09

## 2024-09-27 RX ADMIN — OXYBUTYNIN CHLORIDE 5 MG: 5 SYRUP ORAL at 10:09

## 2024-09-27 RX ADMIN — MIDAZOLAM HYDROCHLORIDE 2 MG: 2 INJECTION, SOLUTION INTRAMUSCULAR; INTRAVENOUS at 01:09

## 2024-09-27 RX ADMIN — LIDOCAINE HYDROCHLORIDE 100 MG: 20 INJECTION, SOLUTION EPIDURAL; INFILTRATION; INTRACAUDAL; PERINEURAL at 02:09

## 2024-09-27 RX ADMIN — SUGAMMADEX 400 MG: 100 INJECTION, SOLUTION INTRAVENOUS at 04:09

## 2024-09-27 RX ADMIN — LABETALOL HYDROCHLORIDE 5 MG: 5 INJECTION, SOLUTION INTRAVENOUS at 04:09

## 2024-09-27 RX ADMIN — ROCURONIUM BROMIDE 20 MG: 10 INJECTION, SOLUTION INTRAVENOUS at 03:09

## 2024-09-27 RX ADMIN — CEFTRIAXONE 1 G: 1 INJECTION, POWDER, FOR SOLUTION INTRAMUSCULAR; INTRAVENOUS at 02:09

## 2024-09-27 RX ADMIN — Medication 10 MG: at 03:09

## 2024-09-27 RX ADMIN — OXYCODONE 5 MG: 5 TABLET ORAL at 06:09

## 2024-09-27 RX ADMIN — Medication 10 MG: at 02:09

## 2024-09-27 RX ADMIN — ACETAMINOPHEN 1000 MG: 500 TABLET ORAL at 11:09

## 2024-09-27 RX ADMIN — Medication 10 MG: at 04:09

## 2024-09-27 RX ADMIN — ROCURONIUM BROMIDE 50 MG: 10 INJECTION, SOLUTION INTRAVENOUS at 02:09

## 2024-09-27 NOTE — ANESTHESIA PREPROCEDURE EVALUATION
09/27/2024  Pre-operative evaluation for Procedure(s) (LRB):BIOPSY, PROSTATE     Theo Matthew Jr. is a 54 y.o. male     Past Medical History:   Diagnosis Date    Internal hemorrhoid        Review of patient's allergies indicates:  No Known Allergies    No current facility-administered medications on file prior to encounter.     Current Outpatient Medications on File Prior to Encounter   Medication Sig Dispense Refill    ciprofloxacin HCl (CIPRO) 500 MG tablet Take 500 mg by mouth every 12 (twelve) hours.      docusate sodium (COLACE) 100 MG capsule Take 1 capsule (100 mg total) by mouth 2 (two) times daily. 30 capsule 1    docusate sodium (COLACE) 100 MG capsule Take 1 capsule (100 mg total) by mouth 2 (two) times daily. 30 capsule 0    ondansetron (ZOFRAN) 8 MG tablet Take 1 tablet (8 mg total) by mouth every 8 (eight) hours as needed for Nausea. Please deliver to bedside at [facility] 30 tablet 0       Past Surgical History:   Procedure Laterality Date    COLONOSCOPY  02/18/2020    COLONOSCOPY N/A 2/18/2020    Procedure: COLONOSCOPY/Suprep;  Surgeon: Agueda Jacques MD;  Location: [facility];  Service: Endoscopy;  Laterality: N/A;    IRRIGATION AND DEBRIDEMENT OF UPPER EXTREMITY Right 6/11/2024    Procedure: IRRIGATION AND DEBRIDEMENT, UPPER EXTREMITY;  Surgeon: Maryjo Chamberlain MD;  Location: [facility] OR;  Service: Orthopedics;  Laterality: Right;    KNEE LIGAMENT RECONSTRUCTION Right     REPAIR OF EXTENSOR TENDON Right 6/11/2024    Procedure: REPAIR, TENDON, EXTENSOR LONG FINGER;  Surgeon: Maryjo Chamberlain MD;  Location: [facility] OR;  Service: Orthopedics;  Laterality: Right;    TENOLYSIS, EXTENSOR, HAND OR FINGER Right 6/11/2024    Procedure: TENOLYSIS, EXTENSOR, HAND OR FINGER;  Surgeon: Maryjo Chamberlain MD;  Location: [facility] OR;  Service: Orthopedics;  Laterality: Right;    TENOSYNOVECTOMY Right  "2024    Procedure: TENOSYNOVECTOMY, EXTENSOR TENDON LONG FINGER;  Surgeon: Maryjo Chamberlain MD;  Location: [facility];  Service: Orthopedics;  Laterality: Right;       CBC:   Recent Labs     24  1014 24   WBC 6.92 7.34   HGB 12.0* 12.8*   HCT 35.6* 38.9*    163       CMP:   Recent Labs     24  1014 24    148*  148*   K 3.7 4.0  4.0    109  109   CO2 25 29  29   BUN 16 15  15   CREATININE 1.6* 1.5*  1.5*   GLU 99 97  97   CALCIUM 9.2 9.6  9.6   ALBUMIN 3.8 3.9  3.9   PROT 7.3 7.6  7.6   ALKPHOS 53* 55  55   ALT 61* 66*  66*   AST 44* 42*  42*   BILITOT 0.4 0.6  0.6       COAGS  No results for input(s): "PT", "INR", "PROTIME", "APTT" in the last 72 hours.    BP Readings from Last 3 Encounters:   24 (!) 145/86   24 (!) 148/82   24 (!) 145/92       Diagnostic Studies:      EKD Echo:  No results found for this or any previous visit.        Pre-op Assessment    I have reviewed the Patient Summary Reports.     I have reviewed the Nursing Notes. I have reviewed the NPO Status.   I have reviewed the Medications.     Review of Systems  Anesthesia Hx:  No problems with previous Anesthesia               Denies Personal Hx of Anesthesia complications.                    Cardiovascular:     Hypertension (no formal diagnosis of HTN, not on any meds)                                        Pulmonary:  Pulmonary Normal                       Hepatic/GI:  Hepatic/GI Normal     Denies GERD.             Musculoskeletal:  Arthritis               Endocrine:  Endocrine Normal            Psych:  Psychiatric Normal                    Physical Exam  General: Alert, Cooperative and Oriented    Airway:  Mallampati: III / II  Mouth Opening: Normal  TM Distance: Normal  Tongue: Normal  Neck ROM: Normal ROM    Dental:  Intact        Anesthesia Plan  Type of Anesthesia, risks & benefits discussed:    Anesthesia Type: Regional, Gen ETT  Intra-op " Monitoring Plan: Standard ASA Monitors  Post Op Pain Control Plan: multimodal analgesia  Induction:  IV  Airway Plan: Video, Post-Induction  Informed Consent: Informed consent signed with the Patient and all parties understand the risks and agree with anesthesia plan.  All questions answered.   ASA Score: 2  Day of Surgery Review of History & Physical: H&P Update referred to the surgeon/provider.    Ready For Surgery From Anesthesia Perspective.     .

## 2024-09-27 NOTE — BRIEF OP NOTE
[facility] ([facility])  Brief Operative Note    Surgery Date: 9/27/2024     Surgeons and Role:     * Isaiah Mckeon MD - Primary     * Giovanni Atwood MD - Resident - Assisting     * Mark Haile MD - Resident - Assisting        Pre-op Diagnosis:  Gross hematuria [R31.0]    Post-op Diagnosis:  Post-Op Diagnosis Codes:     * Gross hematuria [R31.0]    Procedure(s) (LRB):  BIOPSY, PROSTATE/TRANSPERINEAL (N/A)  INSERTION, STENT, URETER (Bilateral)  PYELOGRAM, RETROGRADE (Bilateral)  ULTRASOUND, RECTAL APPROACH (N/A)  TURP (TRANSURETHRAL RESECTION OF PROSTATE) (N/A)    Anesthesia: General    Operative Findings:   TURP of extensive prostatic mass extending to the bladder lumen and ovelying the bilateral ureteral orifices  Bilateral retrograde pyelograms with moderate hydronephrosis  Uncomplicated bilateral ureteral stent placement    Estimated Blood Loss: * No values recorded between 9/27/2024  2:21 PM and 9/27/2024  4:32 PM *         Specimens:   Specimen (24h ago, onward)       Start     Ordered    09/27/24 1609  Specimen to Pathology, Surgery Urology  Once        Comments: Pre-op Diagnosis: Gross hematuria [R31.0]Procedure(s):BIOPSY, PROSTATE/TRANSPERINEALTURBT (TRANSURETHRAL RESECTION OF BLADDER TUMOR)INSERTION, STENT, URETERPYELOGRAM, RETROGRADEULTRASOUND, RECTAL APPROACH Number of specimens: 7Name of specimens: 1 Left Pitts 2 Left Middle 3 Left Base 4 Right Pitts 5 Right Middle 6 Right Base 7 Bladder neck tumor     References:    Click here for ordering Quick Tip   Question Answer Comment   Procedure Type: Urology    Release to patient Immediate        09/27/24 1631                      Discharge Note    OUTCOME: Patient tolerated treatment/procedure well without complication and is now ready for discharge.    DISPOSITION: Home or Self Care    FINAL DIAGNOSIS:  Prostate mass    FOLLOWUP: In clinic    DISCHARGE INSTRUCTIONS:  No discharge procedures on file.

## 2024-09-27 NOTE — TRANSFER OF CARE
"Anesthesia Transfer of Care Note    Patient: Theo Matthew Jr.    Procedure(s) Performed: Procedure(s) (LRB):  BIOPSY, PROSTATE/TRANSPERINEAL (N/A)  INSERTION, STENT, URETER (Bilateral)  PYELOGRAM, RETROGRADE (Bilateral)  ULTRASOUND, RECTAL APPROACH (N/A)  TURP (TRANSURETHRAL RESECTION OF PROSTATE) (N/A)    Patient location: PACU    Anesthesia Type: general    Transport from OR: Transported from OR on 6-10 L/min O2 by face mask with adequate spontaneous ventilation    Post pain: adequate analgesia    Post assessment: no apparent anesthetic complications    Post vital signs: stable    Level of consciousness: sedated    Nausea/Vomiting: no nausea/vomiting    Complications: none    Transfer of care protocol was followed      Last vitals: Visit Vitals  /76   Pulse 77   Temp 36.9 °C (98.4 °F) (Temporal)   Resp 16   Ht 5' 6" (1.676 m)   Wt 83 kg (183 lb)   SpO2 100%   BMI 29.54 kg/m²     "

## 2024-09-27 NOTE — ANESTHESIA PROCEDURE NOTES
Intubation    Date/Time: 9/27/2024 2:08 PM    Performed by: Jimmy Rodriguez MD  Authorized by: Parish Mcginnis Jr., MD    Intubation:     Induction:  Intravenous    Intubated:  Postinduction    Mask Ventilation:  Easy mask    Attempts:  1    Attempted By:  Resident anesthesiologist    Method of Intubation:  Direct    Blade:  Kandice 3    Laryngeal View Grade: Grade I - full view of cords      Difficult Airway Encountered?: No      Complications:  None    Airway Device:  Oral endotracheal tube    Airway Device Size:  7.5    Style/Cuff Inflation:  Cuffed (inflated to minimal occlusive pressure)    Tube secured:  21    Secured at:  The teeth    Placement Verified By:  Capnometry    Complicating Factors:  None    Findings Post-Intubation:  BS equal bilateral and atraumatic/condition of teeth unchanged

## 2024-09-27 NOTE — PLAN OF CARE
PT ambulated onto the unit , no distress noted. Pre-op completed and perioperative routine discussed, all questions and concerns were addressed.

## 2024-09-27 NOTE — OP NOTE
[facility]  Operative Note    Date: 09/27/2024    Pre-Op Diagnosis:   Bladder mass  Elevated PSA  Bilateral hydroureteronephrosis    Post-Op Diagnosis:   Prostate mass    Procedure(s) Performed:   1. Transurethral resection of the prostate  2. Transperineal prostate biopsy with needle (83759)  3. Transrectal US of the prostate for size (88395)  4. US guidance for needle placement (20870)  5.  Bilateral retrograde pyelograms  6.  Bilateral ureteral stent placement    Specimen(s):   Left apex  Left mid  Left base  Right apex  Right mid  Right base  Prostate mass    Staff Surgeon: Isaiah Mckeon MD    Assistant Surgeon: Giovanni Atwood MD    Anesthesia: General endotracheal anesthesia    Indications: Theo Matthew Jr. is a 54 y.o. male with a bladder mass and elevated PSA.  He presents today for prostate biopsy and TURBT/TURP.    Findings:   Uncomplicated transperineal prostate biopsy; prostate size 50.52cc on transrectal US, images saved to chart  Multinodular mass extending from the verumontanum to and beyond the bladder neck, involving the ureteral orifices with a large intravesical component, resected in its entirety  Bilateral retrograde pyelograms demonstrated moderate hydronephrosis and a tortuous left mid and distal ureter.  Bilateral ureteral stents placed in good position      Estimated Blood Loss: min    Drains:  22 Citizen of the [place] three-way Davis catheter with 50 cc in balloon    Procedure in detail:  After the risks, benefits and possible complications of the procedure were explained, consents were obtained. The patient was taken to the operating room and placed under anesthesia. Pre-operative antibiotics were administered 30 minutes prior to expected start time. The patient was placed in the dorsal lithotomy position and prepped and draped in the normal and sterile fashion. Time out was performed.      A transrectal ultrasound probe was introduced.  The prostate volume was measured and determined to  be 55 cc.  We then performed a systematic transperineal prostate biopsy with 2 biopsies taken each from the left apex, mid, and base, and right apex, mid, and base.  The transrectal ultrasound probe was then removed. A bimanual exam revealed a firm multinodular prostate    A rigid cystoscope in a 22 Fr sheath was introduced into the bladder per urethra. This passed easily.  A mass was visualized extending from the verumontanum to and beyond the bladder neck, involving the ureteral orifices with a large intravesical component.  The cystoscope was removed and a resectoscope and a 26 Cymro sheath with the visual obturator was introduced into the bladder per urethra.  Visual obturator was exchanged for the resecting mechanism.  The mass was resected in its entirety which facilitated visualization of the UOs which were involved within the mass.  Once the left UO was identified, a motion wire was passed through the UO into the kidney under fluoroscopy.  The left ureter was noted to have tortuosity of the distal and mid ureter.  A 5 Cymro ureteral catheter was passed over the wire into the kidney under fluoroscopy.  A retrograde pyelogram was performed which revealed moderate hydronephrosis.  The wire was passed through the 5 Cymro ureteral catheter and the 5 Cymro ureteral catheter was removed.  A 6 Cymro by 28 cm ureteral stent was then passed over the wire into the kidney under fluoroscopy.  The wire was removed and good coils were noted in the kidney and bladder.  This process was repeated once the right UO was identified.    Ellik evacuators were used to evacuate chips. Hemostasis was achieved. A 22 Fr 3 way catheter was placed and CBI started. It was noted to be draining clear at the end of the case.    The patient tolerated the procedure well and was transferred to recovery in stable condition.    Disposition:  The patient will be admitted to urology service overnight on CBI.    Giovanni Atwood MD

## 2024-09-27 NOTE — TELEPHONE ENCOUNTER
Hello, this is a message to notify you regarding your xray appointment at [place] [facility] 30 minutes prior to your appointment time on 9/30/24 with Dr. Chamberlain for x-rays.     Please arrive a little early to check in prior to your appointment approximately at 10:00 AM.     Xray does run behind sometimes, we do appreciate your patience in advance. If you would like to get your xray before your appointment please reschedule your xray at a location near you at your convenience through the [facility].    *Please arrive at your informed time above, if you are more than 15 Minutes late to your appointment with Dr. Chamberlain we will have to reschedule your appointment. This will allow you to be seen in a timely manor and be conscious to other patients being seen that same day*     Please respond to this message to confirm you received this notification.

## 2024-09-28 VITALS
BODY MASS INDEX: 28.83 KG/M2 | RESPIRATION RATE: 20 BRPM | TEMPERATURE: 98 F | WEIGHT: 173.06 LBS | DIASTOLIC BLOOD PRESSURE: 78 MMHG | OXYGEN SATURATION: 96 % | SYSTOLIC BLOOD PRESSURE: 128 MMHG | HEIGHT: 65 IN | HEART RATE: 76 BPM

## 2024-09-28 LAB
ANION GAP SERPL CALC-SCNC: 11 MMOL/L (ref 8–16)
BASOPHILS # BLD AUTO: 0.02 K/UL (ref 0–0.2)
BASOPHILS NFR BLD: 0.1 % (ref 0–1.9)
BUN SERPL-MCNC: 17 MG/DL (ref 6–20)
CALCIUM SERPL-MCNC: 8.5 MG/DL (ref 8.7–10.5)
CHLORIDE SERPL-SCNC: 103 MMOL/L (ref 95–110)
CO2 SERPL-SCNC: 25 MMOL/L (ref 23–29)
CREAT SERPL-MCNC: 1.5 MG/DL (ref 0.5–1.4)
DIFFERENTIAL METHOD BLD: ABNORMAL
EOSINOPHIL # BLD AUTO: 0 K/UL (ref 0–0.5)
EOSINOPHIL NFR BLD: 0 % (ref 0–8)
ERYTHROCYTE [DISTWIDTH] IN BLOOD BY AUTOMATED COUNT: 13.3 % (ref 11.5–14.5)
EST. GFR  (NO RACE VARIABLE): 54.6 ML/MIN/1.73 M^2
GLUCOSE SERPL-MCNC: 106 MG/DL (ref 70–110)
HCT VFR BLD AUTO: 32.1 % (ref 40–54)
HGB BLD-MCNC: 10.8 G/DL (ref 14–18)
IMM GRANULOCYTES # BLD AUTO: 0.04 K/UL (ref 0–0.04)
IMM GRANULOCYTES NFR BLD AUTO: 0.3 % (ref 0–0.5)
LYMPHOCYTES # BLD AUTO: 1.9 K/UL (ref 1–4.8)
LYMPHOCYTES NFR BLD: 12.8 % (ref 18–48)
MCH RBC QN AUTO: 29.8 PG (ref 27–31)
MCHC RBC AUTO-ENTMCNC: 33.6 G/DL (ref 32–36)
MCV RBC AUTO: 89 FL (ref 82–98)
MONOCYTES # BLD AUTO: 1.1 K/UL (ref 0.3–1)
MONOCYTES NFR BLD: 7.5 % (ref 4–15)
NEUTROPHILS # BLD AUTO: 11.8 K/UL (ref 1.8–7.7)
NEUTROPHILS NFR BLD: 79.3 % (ref 38–73)
NRBC BLD-RTO: 0 /100 WBC
PLATELET # BLD AUTO: 135 K/UL (ref 150–450)
PMV BLD AUTO: 13.1 FL (ref 9.2–12.9)
POTASSIUM SERPL-SCNC: 3.7 MMOL/L (ref 3.5–5.1)
RBC # BLD AUTO: 3.62 M/UL (ref 4.6–6.2)
SODIUM SERPL-SCNC: 139 MMOL/L (ref 136–145)
WBC # BLD AUTO: 14.87 K/UL (ref 3.9–12.7)

## 2024-09-28 PROCEDURE — G0378 HOSPITAL OBSERVATION PER HR: HCPCS

## 2024-09-28 PROCEDURE — 25000003 PHARM REV CODE 250

## 2024-09-28 PROCEDURE — 85025 COMPLETE CBC W/AUTO DIFF WBC: CPT

## 2024-09-28 PROCEDURE — 36415 COLL VENOUS BLD VENIPUNCTURE: CPT

## 2024-09-28 PROCEDURE — 80048 BASIC METABOLIC PNL TOTAL CA: CPT

## 2024-09-28 RX ORDER — OXYBUTYNIN CHLORIDE 5 MG/1
5 TABLET ORAL 3 TIMES DAILY PRN
Qty: 15 TABLET | Refills: 0 | Status: SHIPPED | OUTPATIENT
Start: 2024-09-28 | End: 2024-10-03

## 2024-09-28 RX ADMIN — ACETAMINOPHEN 1000 MG: 500 TABLET ORAL at 05:09

## 2024-09-28 RX ADMIN — OXYBUTYNIN CHLORIDE 5 MG: 5 SYRUP ORAL at 08:09

## 2024-09-28 NOTE — PROGRESS NOTES
[facility] - [facility]  Urology  Progress Note    Patient Name: Theo Matthew Jr.  MRN: [id number]  Admission Date: 9/27/2024  Hospital Length of Stay: 0 days  Code Status: Full Code   Attending Provider: Isaiah Mckeon MD   Primary Care Physician: Tenisha Becerra MD    Subjective:     HPI:  Theo Matthew Jr. is a 54 y.o. male with a bladder mass and elevated PSA.  He presents today for prostate biopsy and TURBT/TURP.     Interval History: NAOE, doing well this AM, not in pain, no n/v, redman draining cyu, will plan for discharge today with redman and outpatient void trial       Objective:     Temp:  [96.6 °F (35.9 °C)-98.5 °F (36.9 °C)] 97.6 °F (36.4 °C)  Pulse:  [64-83] 76  Resp:  [12-20] 20  SpO2:  [91 %-100 %] 96 %  BP: (123-163)/() 128/78     Body mass index is 28.8 kg/m².    Date 09/28/24 0700 - 09/29/24 0659   Shift 2988-7444 4751-7117 6043-5035 24 Hour Total   INTAKE   Shift Total(mL/kg)       OUTPUT   Urine(mL/kg/hr) 300   300   Shift Total(mL/kg) 300(3.8)   300(3.8)   Weight (kg) 78.5 78.5 78.5 78.5          Drains       Drain  Duration                  Urethral Catheter 09/27/24 1600 <1 day                     Physical Exam  Constitutional:       General: He is not in acute distress.  HENT:      Head: Normocephalic and atraumatic.      Nose: Nose normal.   Eyes:      Conjunctiva/sclera: Conjunctivae normal.   Cardiovascular:      Rate and Rhythm: Normal rate.   Pulmonary:      Effort: Pulmonary effort is normal. No respiratory distress.   Genitourinary:     Comments: Redman in place draining cyu  Musculoskeletal:         General: No deformity.   Skin:     General: Skin is warm and dry.   Neurological:      General: No focal deficit present.      Mental Status: He is alert.   Psychiatric:         Mood and Affect: Mood normal.         Behavior: Behavior normal.           Significant Labs:    BMP:  Recent Labs   Lab 09/25/24  1014 09/26/24  0924 09/28/24  0550    148*  148* 139   K 3.7 4.0  4.0 3.7     109  109 103   CO2 25 29  29 25   BUN 16 15  15 17   CREATININE 1.6* 1.5*  1.5* 1.5*   CALCIUM 9.2 9.6  9.6 8.5*       CBC:   Recent Labs   Lab 09/25/24  1014 09/26/24  0924 09/28/24  0550   WBC 6.92 7.34 14.87*   HGB 12.0* 12.8* 10.8*   HCT 35.6* 38.9* 32.1*    163 135*       All pertinent labs results from the past 24 hours have been reviewed.    Significant Imaging:  All pertinent imaging results/findings from the past 24 hours have been reviewed.                  Assessment/Plan:     * Prostate mass  54yoM s/p TURP/BT on 9/27 doing well postop     - mmpc   - ditpropan PRN for bladder spasms   - regular diet    - Plan today to discharge home with redman   - Will need to follow up outpatient for void trial on Tuesday   - Will need to follow up with Dr. Mckeon in two weeks        VTE Risk Mitigation (From admission, onward)           Ordered     Place MARLON hose  Until discontinued         09/27/24 1208     Place sequential compression device  Until discontinued         09/27/24 1208                    Naman Donato MD  Urology  [facility] - Surgery

## 2024-09-28 NOTE — PLAN OF CARE
He is discharged to home with spouse.   All lda's removed at discharge.   All home meds are picked up from the pharmacy.   Cath care instructions are reviewed and demonstrated and he voiced understanding.   Catheter is flushed and is patent to bedside. Bag.   Bedside bag is changed prior to discharge and teaching done with the spouse how to empty bag and record contents at least twice daily.   I explained that if 24 hour output is not equivalent to or greater than 30 ml/hour average they should contact on call for instructions and he voiced understanding.   No sign of distress noted at this time and he is aaox4 and pleasant at discharge.

## 2024-09-28 NOTE — SUBJECTIVE & OBJECTIVE
Interval History: NAOE, doing well this AM, not in pain, no n/v, redman draining cyu, will plan for discharge today with redman and outpatient void trial       Objective:     Temp:  [96.6 °F (35.9 °C)-98.5 °F (36.9 °C)] 97.6 °F (36.4 °C)  Pulse:  [64-83] 76  Resp:  [12-20] 20  SpO2:  [91 %-100 %] 96 %  BP: (123-163)/() 128/78     Body mass index is 28.8 kg/m².    Date 09/28/24 0700 - 09/29/24 0659   Shift 9540-5824 1292-6545 9050-3560 24 Hour Total   INTAKE   Shift Total(mL/kg)       OUTPUT   Urine(mL/kg/hr) 300   300   Shift Total(mL/kg) 300(3.8)   300(3.8)   Weight (kg) 78.5 78.5 78.5 78.5          Drains       Drain  Duration                  Urethral Catheter 09/27/24 1600 <1 day                     Physical Exam  Constitutional:       General: He is not in acute distress.  HENT:      Head: Normocephalic and atraumatic.      Nose: Nose normal.   Eyes:      Conjunctiva/sclera: Conjunctivae normal.   Cardiovascular:      Rate and Rhythm: Normal rate.   Pulmonary:      Effort: Pulmonary effort is normal. No respiratory distress.   Genitourinary:     Comments: Redman in place draining cyu  Musculoskeletal:         General: No deformity.   Skin:     General: Skin is warm and dry.   Neurological:      General: No focal deficit present.      Mental Status: He is alert.   Psychiatric:         Mood and Affect: Mood normal.         Behavior: Behavior normal.           Significant Labs:    BMP:  Recent Labs   Lab 09/25/24  1014 09/26/24  0924 09/28/24  0550    148*  148* 139   K 3.7 4.0  4.0 3.7    109  109 103   CO2 25 29  29 25   BUN 16 15  15 17   CREATININE 1.6* 1.5*  1.5* 1.5*   CALCIUM 9.2 9.6  9.6 8.5*       CBC:   Recent Labs   Lab 09/25/24  1014 09/26/24  0924 09/28/24  0550   WBC 6.92 7.34 14.87*   HGB 12.0* 12.8* 10.8*   HCT 35.6* 38.9* 32.1*    163 135*       All pertinent labs results from the past 24 hours have been reviewed.    Significant Imaging:  All pertinent imaging  results/findings from the past 24 hours have been reviewed.

## 2024-09-28 NOTE — DISCHARGE SUMMARY
[facility] - Surgery  Urology  Discharge Summary      Patient Name: Theo Matthew Jr.  MRN: [id number]  Admission Date: 9/27/2024  Hospital Length of Stay: 0 days  Discharge Date and Time:  09/28/2024 12:26 PM  Attending Physician: Isaiah Mckeon MD   Discharging Provider: Naman Donato MD  Primary Care Physician: Tenisha Becerra MD    HPI:   Theo Matthew Jr. is a 54 y.o. male with a bladder mass and elevated PSA.  He presents today for prostate biopsy and TURBT/TURP.     Procedure(s) (LRB):  BIOPSY, PROSTATE/TRANSPERINEAL (N/A)  INSERTION, STENT, URETER (Bilateral)  PYELOGRAM, RETROGRADE (Bilateral)  ULTRASOUND, RECTAL APPROACH (N/A)  TURP (TRANSURETHRAL RESECTION OF PROSTATE) (N/A)     Indwelling Lines/Drains at time of discharge:   Lines/Drains/Airways       Drain  Duration                  Urethral Catheter 09/27/24 1600 <1 day                    Hospital Course:  Please see the preoperative H&P and other available documentation for full details related to history prior to this admission.  Briefly, Theo Matthew Jr. is a 54 y.o. male who was admitted following scheduled elective surgery for Prostate mass    Following a complete preoperative discussion of the risks and benefits of surgery with signed informed consent, the patient was taken to the operating room on 9/27/2024 and underwent the above stated procedures.  The patient tolerated surgery well and there were no complications.  Please see the operative report for full intraoperative findings and details.  Postoperatively, the patient did well and was transferred from the PACU to the floor in stable condition where they had a stable and uncomplicated hospital course.  Labs and vital signs remained stable and appropriate throughout course.  Diet was advanced as tolerated and the patient's pain was controlled on oral pain medications without problem.  Currently, the patient is doing well at 1 Day Post-Op and is stable and appropriate for discharge home at  this time.        Goals of Care Treatment Preferences:  Code Status: Full Code      Consults: none    Significant Diagnostic Studies:     Pending Diagnostic Studies:       Procedure Component Value Units Date/Time    Specimen to Pathology, Surgery Urology [[id number]] Collected: 09/27/24 1631    Order Status: Sent Lab Status: In process Updated: 09/27/24 1632    Specimen: Tissue             Final Active Diagnoses:    Diagnosis Date Noted POA    PRINCIPAL PROBLEM:  Prostate mass [N42.89] 09/27/2024 Yes      Problems Resolved During this Admission:         Discharged Condition: good    Disposition: Home or Self Care    Follow Up: in clinic    Patient Instructions:      CBC W/ AUTO DIFFERENTIAL   Standing Status: Future Standing Exp. Date: 12/27/25     COMPREHENSIVE METABOLIC PANEL   Standing Status: Future Standing Exp. Date: 12/27/25     Order Specific Question Answer Comments   Send normal result to authorizing provider's In Basket if patient is active on MyChart: Yes      Medications:  Reconciled Home Medications:      Medication List        START taking these medications      oxybutynin 5 MG Tab  Commonly known as: DITROPAN  Take 1 tablet (5 mg total) by mouth 3 (three) times daily as needed (bladder spasms).              Time spent on the discharge of patient: 20 minutes    Naman Donato MD  Urology  [facility] - Surgery

## 2024-09-28 NOTE — HPI
Theo CLIVE Matthew Jr. is a 54 y.o. male with a bladder mass and elevated PSA.  He presents today for prostate biopsy and TURBT/TURP.

## 2024-09-28 NOTE — NURSING TRANSFER
Nursing Transfer Note      9/27/2024   7:59 PM    Reason patient is being transferred: post-procedure    Transfer To: 503    Transfer via stretcher    Transfer with none    Transported by transport    Order for Tele Monitor? No    Additional Lines: Davis Catheter    Medicines sent: LR gtt    Any special needs or follow-up needed: CBI    Patient belongings transferred with patient: No    Chart send with patient: Yes    Notified: spouse

## 2024-09-28 NOTE — ASSESSMENT & PLAN NOTE
54yoM s/p TURP/BT on 9/27 doing well postop     - mmpc   - ditpropan PRN for bladder spasms   - regular diet    - Plan today to discharge home with redman   - Will need to follow up outpatient for void trial on Tuesday   - Will need to follow up with Dr. Mckeon in two weeks

## 2024-09-28 NOTE — DISCHARGE INSTRUCTIONS
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    Go to the ER if:  Your catheter stops draining  You are having severe abdominal pain  Inability to void if you do not have a catheter    Call the doctor if:  Temperature is greater than 101F  Persistent vomiting and inability to keep food down

## 2024-09-28 NOTE — NURSING
Nurses Note -- 4 Eyes      9/27/2024   8:41 PM      Skin assessed during: Admit      [x] No Altered Skin Integrity Present    []Prevention Measures Documented      [] Yes- Altered Skin Integrity Present or Discovered   [] LDA Added if Not in Epic (Describe Wound)   [] New Altered Skin Integrity was Present on Admit and Documented in LDA   [] Wound Image Taken    Wound Care Consulted? No    Attending Nurse:  Eugene     Second RN/Staff Member:  Arelis

## 2024-09-30 ENCOUNTER — PATIENT MESSAGE (OUTPATIENT)
Dept: ADMINISTRATIVE | Facility: CLINIC | Age: 54
End: 2024-09-30
Payer: COMMERCIAL

## 2024-09-30 ENCOUNTER — PATIENT OUTREACH (OUTPATIENT)
Dept: ADMINISTRATIVE | Facility: CLINIC | Age: 54
End: 2024-09-30
Payer: COMMERCIAL

## 2024-09-30 NOTE — PROGRESS NOTES
C3 nurse attempted to contact Theo Matthew Jr. for a TCC post hospital discharge follow up call. No answer. No voicemail available.The patient does not have a scheduled HOSFU appointment. Message sent to PCP staff for assistance with scheduling visit with patient. The patient does have a follow up appointment with Isaiah Mckeon MD (Urology) on 10/16/24 @ 1:15pm.

## 2024-09-30 NOTE — PROGRESS NOTES
C3 nurse spoke with Theo Matthew Jr. for a TCC post hospital discharge follow up call. The patient does not have a scheduled HOSFU appointment with Tenisha Becerra MD (PCP) within 5-7 days post hospital discharge date 9/28/24. The patient declined PCP FU at this time and wants to keep FU with Isaiah Mckeon MD (Urology) on 10/16/24 @ 1:15pm.     During medication review the patient noted that he is taking Tylenol PRN at this time.

## 2024-09-30 NOTE — ANESTHESIA POSTPROCEDURE EVALUATION
Anesthesia Post Evaluation    Patient: Theo Matthew     Procedure(s) Performed: Procedure(s) (LRB):  BIOPSY, PROSTATE/TRANSPERINEAL (N/A)  INSERTION, STENT, URETER (Bilateral)  PYELOGRAM, RETROGRADE (Bilateral)  ULTRASOUND, RECTAL APPROACH (N/A)  TURP (TRANSURETHRAL RESECTION OF PROSTATE) (N/A)    Final Anesthesia Type: general      Patient location during evaluation: PACU  Patient participation: Yes- Able to Participate  Level of consciousness: awake and alert and oriented  Pain management: adequate  Airway patency: patent    PONV status at discharge: No PONV  Anesthetic complications: no      Cardiovascular status: blood pressure returned to baseline and hemodynamically stable  Respiratory status: unassisted  Hydration status: euvolemic  Follow-up not needed.              Vitals Value Taken Time   /78 09/28/24 1119   Temp 36.4 °C (97.6 °F) 09/28/24 1119   Pulse 76 09/28/24 1119   Resp 20 09/28/24 1119   SpO2 96 % 09/28/24 1119         Event Time   Out of Recovery 18:15:00         Pain/Davin Score: No data recorded

## 2024-10-01 ENCOUNTER — CLINICAL SUPPORT (OUTPATIENT)
Dept: UROLOGY | Facility: CLINIC | Age: 54
End: 2024-10-01
Payer: COMMERCIAL

## 2024-10-01 DIAGNOSIS — N42.89 PROSTATE MASS: Primary | ICD-10-CM

## 2024-10-01 NOTE — PROGRESS NOTES
Patient presented with redman catheter in place to gravity. Approximately 500mL of pink tinged urine in bag.  The redman catheter balloon was taken down with 10cc syringe x4 to ensure all fluid was removed. Then the redman catheter was removed atraumatically.  Patient was able to urinate approximately 200mL of pink tinged urine.  Given urinary retention instructions and advised to return to clinic or go to the emergency room if after hours.  Patient verbalized understanding.

## 2024-10-03 ENCOUNTER — CLINICAL SUPPORT (OUTPATIENT)
Dept: REHABILITATION | Facility: HOSPITAL | Age: 54
End: 2024-10-03
Payer: COMMERCIAL

## 2024-10-03 DIAGNOSIS — M25.641 STIFFNESS OF FINGER JOINT OF RIGHT HAND: ICD-10-CM

## 2024-10-03 DIAGNOSIS — M79.644 PAIN OF RIGHT MIDDLE FINGER: Primary | ICD-10-CM

## 2024-10-03 PROCEDURE — 97530 THERAPEUTIC ACTIVITIES: CPT

## 2024-10-03 PROCEDURE — 97022 WHIRLPOOL THERAPY: CPT

## 2024-10-03 PROCEDURE — 97110 THERAPEUTIC EXERCISES: CPT

## 2024-10-03 NOTE — PROGRESS NOTES
[facility]  Occupational Therapy Treatment Note     Date: 10/3/2024  Name: Theo Matthew Jr.  Clinic Number: [id number]    Therapy Diagnosis:   Encounter Diagnoses   Name Primary?    Pain of right middle finger Yes    Stiffness of finger joint of right hand          Physician: Maryjo Chamberlain MD    Physician Orders: Continue with ROM and strengthening  Medical Diagnosis from Referral: Medical Diagnosis: L03.011 (ICD-10-CM) - Cellulitis of right middle finger S66.929A (ICD-10-CM) - Laceration of tendon of finger M65.849 (ICD-10-CM) - Extensor tenosynovitis of finger          Evaluation Date: 6/18/2024  Insurance Authorization Period Expiration: 12/31/2024  Plan of Care Certification Period: 10/8/24  Date of Return to MD:  6/24/2024   Visit # / Visits authorized: 25/ 30  FOTO Function Score [id number]   53% on 6/14/2024  63% on 8/22/2024  67% on 9/24/2024  Functional Level Prior to Evaluation:  full use  DOS: 6/11/2024  Procedure:   Right long finger extensor tendon repair zone III, cpt 73214  2.  Right long finger tenosynovectomy EDC tendons, cpt 118061  3.  Right long finger extensor tenolysis, cpt 55266  4.  Right hand irrigation and sharp excisional debridement of skin, subcutaneous tissue, tendon and bone right long finger, cpt 95285     Precautions:  Standard   Time In:11:00 am   Time Out: 12:05 pm  Total Billable Time: 55 min       Subjective     Patient reports compliance with home exercise program and increase in rom.   Response to previous treatment: good   Functional change: able to use hand functionally at home     Pain: 0/10  Location: right hands      Objective     Objective Measures updated at progress report unless specified.  Update: Objective Measures updated at progress report unless specified.     Observation/Appearance:  Mild edema long finger, marquez at PIP remains but visibly decreased. Mild PIP flexion contracture continues.      Edema. Measured in centimeters.    6/18/2024   7/22/24     Right  Left   Long:         P1 7.6  7.4    PIP suture  7.9   P2 7.2  7.1    DIP       P3       Hand ROM. Measured in degrees.    6/18/2024 7/3/2024  7/17/2024 7/22/24 8/5/24 8/13/24 8/15/24 9/5/24 9/17/24 10/3/2024     Right RIght    R          Right     Limited by post-op restrictions                Post- heat                         Long:  MP     /82 83 83 82 93 89 89 93              PIP   -8/ 40 degrees to limits of exercise orthosis  -5/65 -12/61 -10/69 -15/71 -8/80 -18/84 -11/83 -9/92              DIP     /35 22 24 25 35 35 36 44              CHAN                                                   8/20/2024 8/20/2024  9/17/2024 10/3/2024     Left   Right   Right Right      113, 103, 108  Avg =108 48, 50, 65  Avg = 54  55, 58, 67  Avg = 60 81, 76, 85  Avg = 81   Lat pinch           3pt pinch  17 16.5  21    2 pt pinch                          Treatment     Theo received the treatments listed below:    Supervised Modality:                                                      Patient received fluido therapy to left hand  with fingers taped in flexion for 10 minutes to increase blood flow, circulation, pain management and for tissue elasticity prior to therex      Therapeutic Exercise: 25 min  Reassessment AROM and  strength  LLPS: PROM PIP extension, Isolated DIP flexion, Hook, straight fist, full fist 20+ reps each  AROM: IP joint blocking  10+ reps each  AAROM: hooks, Straight fists, full fists  15+ reps      Therapeutic activities x 20 min        BTE Simulator II                   Tool Plane of motion and time                   504   Wrist flex, wrist ext 45 sec ea   302  Rad dev, ulnar dev 45 sec   162  x 60 sec   162 lat pinch,3-jaw pinch 45 sec ea       Blue Flex Bar Twisting: Pro, sup, wrist flex, wrist extension, wrist rd, wrist ud 20 reps each  Patient Education and Home Exercises     Education provided:   - cont HEP  - Progress towards goals     Written Home Exercises Provided: Pt  instructed to continue prior HEP.  Exercises were reviewed and Theo was able to demonstrate them prior to the end of the session.  Theo demonstrated good  understanding of the home exercise program provided. See electronic medical record under Patient Instructions for exercises provided during therapy sessions.       Assessment   Reassessment reveals further increase in rom and . Right  is now 75% of the left non-dominant hand.  Theo is progressing well towards his goals and there are no updates to goals at this time. Pt prognosis is Good.     Patient will continue to benefit from skilled outpatient occupational therapy to address the deficits listed in the problem list on initial evaluation provide patient/family education and to maximize patient's level of independence in the home and community environment.     Patient's spiritual, cultural and educational needs considered and patient agreeable to plan of care and goals.    Anticipated barriers to occupational therapy: Duration and nature of condiction     Goals:     Previous Short Term Goals Status:   Met  New Short Term Goals Status = Long Term Goal Status: continue per initial plan of care.  Long Term Goals (LTGs); to be met by discharge.  LTG #1: Pt will report a pain level of 2 out of 10 with ADLs/IADLs  (MET)        LTG #2: Pt will demo improved FOTO score by 20 points. (Progressing)  LTG #3: Pt will return to prior level of function for ADLs /IADLs (Progressing)    Plan     Updates/Grading for next session: cont as tolerated     Chrissy Snowden OT   10/3/2024

## 2024-10-04 ENCOUNTER — LAB VISIT (OUTPATIENT)
Dept: LAB | Facility: HOSPITAL | Age: 54
End: 2024-10-04
Attending: INTERNAL MEDICINE
Payer: COMMERCIAL

## 2024-10-04 DIAGNOSIS — L03.011 CELLULITIS OF RIGHT MIDDLE FINGER: ICD-10-CM

## 2024-10-04 LAB
ALBUMIN SERPL BCP-MCNC: 3.9 G/DL (ref 3.5–5.2)
ALP SERPL-CCNC: 70 U/L (ref 55–135)
ALT SERPL W/O P-5'-P-CCNC: 35 U/L (ref 10–44)
ANION GAP SERPL CALC-SCNC: 12 MMOL/L (ref 8–16)
AST SERPL-CCNC: 22 U/L (ref 10–40)
BASOPHILS # BLD AUTO: 0.04 K/UL (ref 0–0.2)
BASOPHILS NFR BLD: 0.6 % (ref 0–1.9)
BILIRUB SERPL-MCNC: 0.4 MG/DL (ref 0.1–1)
BUN SERPL-MCNC: 16 MG/DL (ref 6–20)
CALCIUM SERPL-MCNC: 9.7 MG/DL (ref 8.7–10.5)
CHLORIDE SERPL-SCNC: 104 MMOL/L (ref 95–110)
CO2 SERPL-SCNC: 26 MMOL/L (ref 23–29)
CREAT SERPL-MCNC: 1.3 MG/DL (ref 0.5–1.4)
CRP SERPL-MCNC: 5.5 MG/L (ref 0–8.2)
DIFFERENTIAL METHOD BLD: ABNORMAL
EOSINOPHIL # BLD AUTO: 0.1 K/UL (ref 0–0.5)
EOSINOPHIL NFR BLD: 2 % (ref 0–8)
ERYTHROCYTE [DISTWIDTH] IN BLOOD BY AUTOMATED COUNT: 13 % (ref 11.5–14.5)
ERYTHROCYTE [SEDIMENTATION RATE] IN BLOOD BY PHOTOMETRIC METHOD: 35 MM/HR (ref 0–23)
EST. GFR  (NO RACE VARIABLE): >60 ML/MIN/1.73 M^2
GLUCOSE SERPL-MCNC: 101 MG/DL (ref 70–110)
HCT VFR BLD AUTO: 37.3 % (ref 40–54)
HGB BLD-MCNC: 13 G/DL (ref 14–18)
IMM GRANULOCYTES # BLD AUTO: 0.03 K/UL (ref 0–0.04)
IMM GRANULOCYTES NFR BLD AUTO: 0.5 % (ref 0–0.5)
LYMPHOCYTES # BLD AUTO: 2.5 K/UL (ref 1–4.8)
LYMPHOCYTES NFR BLD: 37.9 % (ref 18–48)
MCH RBC QN AUTO: 29.8 PG (ref 27–31)
MCHC RBC AUTO-ENTMCNC: 34.9 G/DL (ref 32–36)
MCV RBC AUTO: 86 FL (ref 82–98)
MONOCYTES # BLD AUTO: 0.6 K/UL (ref 0.3–1)
MONOCYTES NFR BLD: 8.8 % (ref 4–15)
NEUTROPHILS # BLD AUTO: 3.3 K/UL (ref 1.8–7.7)
NEUTROPHILS NFR BLD: 50.2 % (ref 38–73)
NRBC BLD-RTO: 0 /100 WBC
PLATELET # BLD AUTO: 213 K/UL (ref 150–450)
PLATELET BLD QL SMEAR: ABNORMAL
PMV BLD AUTO: 12.7 FL (ref 9.2–12.9)
POTASSIUM SERPL-SCNC: 4.3 MMOL/L (ref 3.5–5.1)
PROT SERPL-MCNC: 8.1 G/DL (ref 6–8.4)
RBC # BLD AUTO: 4.36 M/UL (ref 4.6–6.2)
SODIUM SERPL-SCNC: 142 MMOL/L (ref 136–145)
WBC # BLD AUTO: 6.6 K/UL (ref 3.9–12.7)

## 2024-10-04 PROCEDURE — 85025 COMPLETE CBC W/AUTO DIFF WBC: CPT | Performed by: INTERNAL MEDICINE

## 2024-10-04 PROCEDURE — 85652 RBC SED RATE AUTOMATED: CPT | Performed by: INTERNAL MEDICINE

## 2024-10-04 PROCEDURE — 80053 COMPREHEN METABOLIC PANEL: CPT | Performed by: INTERNAL MEDICINE

## 2024-10-04 PROCEDURE — 36415 COLL VENOUS BLD VENIPUNCTURE: CPT | Performed by: INTERNAL MEDICINE

## 2024-10-04 PROCEDURE — 86140 C-REACTIVE PROTEIN: CPT | Performed by: INTERNAL MEDICINE

## 2024-10-07 NOTE — PROGRESS NOTES
[facility]  Occupational Therapy Treatment Note     Date: 10/8/2024  Name: Theo Matthew Jr.  Clinic Number: [id number]    Therapy Diagnosis:   No diagnosis found.        Physician: Maryjo Chamberlain MD    Physician Orders: Continue with ROM and strengthening  Medical Diagnosis from Referral: Medical Diagnosis: L03.011 (ICD-10-CM) - Cellulitis of right middle finger S66.929A (ICD-10-CM) - Laceration of tendon of finger M65.849 (ICD-10-CM) - Extensor tenosynovitis of finger          Evaluation Date: 6/18/2024  Insurance Authorization Period Expiration: 12/31/2024  Plan of Care Certification Period: 10/8/24  Date of Return to MD:  6/24/2024   Visit # / Visits authorized: 31/ 30  FOTO Function Score [id number]   53% on 6/14/2024  63% on 8/22/2024  67% on 9/24/2024  Functional Level Prior to Evaluation:  full use  DOS: 6/11/2024  Procedure:   Right long finger extensor tendon repair zone III, cpt 37646  2.  Right long finger tenosynovectomy EDC tendons, cpt 385731  3.  Right long finger extensor tenolysis, cpt 16795  4.  Right hand irrigation and sharp excisional debridement of skin, subcutaneous tissue, tendon and bone right long finger, cpt 24859     Precautions:  Standard   Time In:***  am   Time Out: *** pm  Total Billable Time: *** min       Subjective     Patient reports compliance with home exercise program and increase in rom. ***  Response to previous treatment: good   Functional change: able to use hand functionally at home     Pain: 0/10  Location: right hands      Objective     Objective Measures updated at progress report unless specified.  Update: Objective Measures updated at progress report unless specified.     Observation/Appearance:  Mild edema long finger, marquez at PIP remains but visibly decreased. Mild PIP flexion contracture continues.      Edema. Measured in centimeters.    6/18/2024 7/22/24     Right  Left   Long:         P1 7.6  7.4    PIP suture  7.9   P2 7.2  7.1    DIP        P3       Hand ROM. Measured in degrees.    6/18/2024 7/3/2024  7/17/2024 7/22/24 8/5/24 8/13/24 8/15/24 9/5/24 9/17/24 10/3/2024     Right RIght    R          Right     Limited by post-op restrictions                Post- heat                         Long:  MP     /82 83 83 82 93 89 89 93              PIP   -8/ 40 degrees to limits of exercise orthosis  -5/65 -12/61 -10/69 -15/71 -8/80 -18/84 -11/83 -9/92              DIP     /35 22 24 25 35 35 36 44              CHAN                                                   8/20/2024 8/20/2024  9/17/2024 10/3/2024     Left   Right   Right Right      113, 103, 108  Avg =108 48, 50, 65  Avg = 54  55, 58, 67  Avg = 60 81, 76, 85  Avg = 81   Lat pinch           3pt pinch  17 16.5  21    2 pt pinch                          Treatment     Theo received the treatments listed below:    Supervised Modality:                                                      Patient received fluido therapy to left hand  with fingers taped in flexion for 10 minutes to increase blood flow, circulation, pain management and for tissue elasticity prior to therex      Therapeutic Exercise: *** min  Reassessment AROM and  strength  LLPS: PROM PIP extension, Isolated DIP flexion, Hook, straight fist, full fist 20+ reps each  AROM: IP joint blocking  10+ reps each  AAROM: hooks, Straight fists, full fists  15+ reps      Therapeutic activities x *** min        BTE Simulator II                   Tool Plane of motion and time                   504   Wrist flex, wrist ext 45 sec ea   302  Rad dev, ulnar dev 45 sec   162  x 60 sec   162 lat pinch,3-jaw pinch 45 sec ea       Blue Flex Bar Twisting: Pro, sup, wrist flex, wrist extension, wrist rd, wrist ud 20 reps each  Patient Education and Home Exercises     Education provided:   - cont HEP  - Progress towards goals     Written Home Exercises Provided: Pt instructed to continue prior HEP.  Exercises were reviewed and Theo was able to demonstrate  them prior to the end of the session.  Theo demonstrated good  understanding of the home exercise program provided. See electronic medical record under Patient Instructions for exercises provided during therapy sessions.       Assessment   Reassessment reveals further increase in rom and . Right  is now 75% of the left non-dominant hand. ***  Theo is progressing well towards his goals and there are no updates to goals at this time. Pt prognosis is Good.     Patient will continue to benefit from skilled outpatient occupational therapy to address the deficits listed in the problem list on initial evaluation provide patient/family education and to maximize patient's level of independence in the home and community environment.     Patient's spiritual, cultural and educational needs considered and patient agreeable to plan of care and goals.    Anticipated barriers to occupational therapy: Duration and nature of condiction     Goals:     Previous Short Term Goals Status:   Met  New Short Term Goals Status = Long Term Goal Status: continue per initial plan of care.  Long Term Goals (LTGs); to be met by discharge.  LTG #1: Pt will report a pain level of 2 out of 10 with ADLs/IADLs  (MET)        LTG #2: Pt will demo improved FOTO score by 20 points. (Progressing)  LTG #3: Pt will return to prior level of function for ADLs /IADLs (Progressing)    Plan     Updates/Grading for next session: cont as tolerated     LUCIO Toribio   10/8/2024        Pt with acute on chronic pancreatitis. - hx of pseudocysts, calcifications  + ascites. Please make arraingement for paracentesis from IR.   needs labs today ( had wbc of 35  2 days ago)  rectal prolapse- keep stools soft- colorectal surgery following

## 2024-10-08 ENCOUNTER — CLINICAL SUPPORT (OUTPATIENT)
Dept: REHABILITATION | Facility: HOSPITAL | Age: 54
End: 2024-10-08
Payer: COMMERCIAL

## 2024-10-08 DIAGNOSIS — M79.644 PAIN OF RIGHT MIDDLE FINGER: Primary | ICD-10-CM

## 2024-10-08 DIAGNOSIS — M25.641 STIFFNESS OF FINGER JOINT OF RIGHT HAND: ICD-10-CM

## 2024-10-08 LAB
FINAL PATHOLOGIC DIAGNOSIS: NORMAL
GROSS: NORMAL
Lab: NORMAL

## 2024-10-08 PROCEDURE — 97022 WHIRLPOOL THERAPY: CPT

## 2024-10-08 PROCEDURE — 97530 THERAPEUTIC ACTIVITIES: CPT

## 2024-10-08 PROCEDURE — 97110 THERAPEUTIC EXERCISES: CPT

## 2024-10-08 NOTE — PLAN OF CARE
[facility]  Occupational Therapy  Updated Plan of Care and Treatment Note      Date: 10/8/2024  Name: Theo Matthew Jr.  Clinic Number: [id number]     Therapy Diagnosis:        Encounter Diagnoses   Name Primary?    Pain of right middle finger Yes    Stiffness of finger joint of right hand              Physician: Maryjo Chamberlain MD     Physician Orders: Continue with ROM and strengthening  Medical Diagnosis from Referral: Medical Diagnosis: L03.011 (ICD-10-CM) - Cellulitis of right middle finger S66.929A (ICD-10-CM) - Laceration of tendon of finger M65.849 (ICD-10-CM) - Extensor tenosynovitis of finger           Evaluation Date: 6/18/2024  Insurance Authorization Period Expiration: 12/31/2024  Plan of Care Certification Period: extended to 11/4/2024  Date of Return to MD: Nov 25/2024  Visit # / Visits authorized: 25/ 30  FOTO Function Score [id number]   53% on 6/14/2024  63% on 8/22/2024  67% on 9/24/2024  Functional Level Prior to Evaluation:  full use  DOS: 6/11/2024  Procedure:   Right long finger extensor tendon repair zone III, cpt 26362  2.  Right long finger tenosynovectomy EDC tendons, cpt 737100  3.  Right long finger extensor tenolysis, cpt 59189  4.  Right hand irrigation and sharp excisional debridement of skin, subcutaneous tissue, tendon and bone right long finger, cpt 39145     Precautions:  Standard   Time In:10:40 am   Time Out: 11:27  pm  Total Billable Time: 45 min         Subjective      Patient reports compliance with home exercise program and increase in rom. Uses his hand with modifications for more strenuous activities.   Response to previous treatment: good   Functional change: able to use hand functionally at home      Pain: 4/10 with rom exercises and hand use. 2/10 at rest   Location: right hands       Objective      Objective Measures updated at progress report unless specified.  Update: Objective Measures updated at progress report unless specified.      Observation/Appearance:  Mild edema long finger, marquez at PIP remains. Minimal PIP flexion contracture remains     Edema. Measured in centimeters.    6/18/2024 7/22/24     Right  Left   Long:         P1 7.6  7.4    PIP suture  7.9   P2 7.2  7.1    DIP       P3       Hand ROM. Measured in degrees.    6/18/2024 7/3/2024  7/17/2024 7/22/24 8/5/24 8/13/24 8/15/24 9/5/24 9/17/24 10/3/2024     Right RIght    R           Right     Limited by post-op restrictions                 Post- heat                           Long:  MP     /82 83 83 82 93 89 89 93              PIP   -8/ 40 degrees to limits of exercise orthosis  -5/65 -12/61 -10/69 -15/71 -8/80 -18/84 -11/83 -9/92              DIP     /35 22 24 25 35 35 36 44              CHAN                                                       8/20/2024 8/20/2024  9/17/2024 10/3/2024     Left   Right   Right Right      113, 103, 108  Avg =108 48, 50, 65  Avg = 54  55, 58, 67  Avg = 60 81, 76, 85  Avg = 81   Lat pinch            3pt pinch  17 16.5  21     2 pt pinch                              Treatment      Theo received the treatments listed below:    Supervised Modality:                                                      Patient received fluido therapy to left hand for 10 minutes to increase blood flow, circulation, pain management and for tissue elasticity prior to therex      Therapeutic Exercise: 15 min     LLPS: PROM PIP extension, Isolated DIP flexion, Hook, straight fist, full fist 20+ reps each  AROM: IP joint blocking  10+ reps each  AAROM: hooks, Straight fists, full fists  15+ reps        Therapeutic activities x 20 min        BTE Simulator II                   Tool Plane of motion and time                   504   Wrist flex, wrist ext 45 sec ea   302  Rad dev, ulnar dev 45 sec   162  x 60 sec   162 lat pinch,3-jaw pinch 45 sec ea       Blue Flex Bar Twisting: Pro, sup, wrist flex, wrist extension, wrist rd, wrist ud 20 reps each  Patient Education and Home  Exercises      Education provided:   - cont HEP  - Progress towards goals      Written Home Exercises Provided: Pt instructed to continue prior HEP.  Exercises were reviewed and Theo was able to demonstrate them prior to the end of the session.  Theo demonstrated good  understanding of the home exercise program provided. See electronic medical record under Patient Instructions for exercises provided during therapy sessions.       Assessment   Recent reassessment reveals continued increase in rom and  however deficits remain. Right  is  75% of the left non-dominant hand. FOTO score is 67%/100. Some pain remains: 2/10 at rest and 4/10 with rom exercises and hand use. Pt is very pain adaptable and has been throughout OT.      Theo is progressing well towards his goals and there are no updates to goals at this time. Pt prognosis is Good.   Reason for re-certification: Weakness of , rom defcits, and pain.     Patient will continue to benefit from skilled outpatient occupational therapy to address the deficits listed in the problem list on initial evaluation provide patient/family education and to maximize patient's level of independence in the home and community environment.      Patient's spiritual, cultural and educational needs considered and patient agreeable to plan of care and goals.     Anticipated barriers to occupational therapy: Duration and nature of condiction      Goals:     New Short Term Goals Status = Long Term Goal Status: continue per initial plan of care.  Long Term Goals (LTGs); to be met by discharge.  LTG #1: Pt will report a pain level of 2 out of 10 with ADLs/IADLs  (Partially MET)        LTG #2: Pt will demo improved FOTO score by 20 points. (Progressing)  LTG #3: Pt will return to prior level of function for ADLs /IADLs (Progressing)     Plan      Updates/Grading for next session: cont as tolerated      Chrissy Snowden, OT   10/8/2024

## 2024-10-10 ENCOUNTER — CLINICAL SUPPORT (OUTPATIENT)
Dept: REHABILITATION | Facility: HOSPITAL | Age: 54
End: 2024-10-10
Payer: COMMERCIAL

## 2024-10-10 DIAGNOSIS — C61 PROSTATE CANCER: Primary | ICD-10-CM

## 2024-10-10 DIAGNOSIS — M79.644 PAIN OF RIGHT MIDDLE FINGER: Primary | ICD-10-CM

## 2024-10-10 DIAGNOSIS — M25.641 STIFFNESS OF FINGER JOINT OF RIGHT HAND: ICD-10-CM

## 2024-10-10 PROCEDURE — 97110 THERAPEUTIC EXERCISES: CPT | Mod: CO

## 2024-10-10 PROCEDURE — 97022 WHIRLPOOL THERAPY: CPT | Mod: CO

## 2024-10-10 PROCEDURE — 97530 THERAPEUTIC ACTIVITIES: CPT | Mod: CO

## 2024-10-10 NOTE — PROGRESS NOTES
[facility]  Occupational Therapy Treatment Note     Date: 10/10/2024  Name: Theo Matthew Jr.  Clinic Number: [id number]    Therapy Diagnosis: No diagnosis found.  Physician: Maryjo Chamberlain MD    Physician Orders: Continue with ROM and strengthening  Medical Diagnosis from Referral: Medical Diagnosis: L03.011 (ICD-10-CM) - Cellulitis of right middle finger S66.929A (ICD-10-CM) - Laceration of tendon of finger M65.849 (ICD-10-CM) - Extensor tenosynovitis of finger DOS: 6/11/2024  Procedure:   Right long finger extensor tendon repair zone III, cpt 36659  2.  Right long finger tenosynovectomy EDC tendons, cpt 871702  3.  Right long finger extensor tenolysis, cpt 13733  4.  Right hand irrigation and sharp excisional debridement of skin, subcutaneous tissue, tendon and bone right long finger, cpt 17371     Evaluation Date: 6/18/2024    FOTO Function Score [id number]   53% on 6/14/2024  63% on 8/22/2024  67% on 9/24/2024  Functional Level Prior to Evaluation:  full use  Precautions:  Standard     Date of Return to MD: Nov 25/2024    Plan of Care Certification Period: extended to 11/4/2024  Insurance Authorization Period Expiration: 12/31/2024      Visit # / Visits authorized: 32/ 30  Time In:  1:31 pm   Time Out: 2:14 pm  Total Billable Time: 43 min          Subjective     Patient reports: doing well.   He was compliant with home exercise program given last session.   Response to previous treatment:good   Functional change: none since last reported     Pain: 0/10  Location: right hands      Objective     Objective Measures updated at progress report unless specified.  Update: Objective Measures updated at progress report unless specified.     Observation/Appearance:  Mild edema long finger, marquez at PIP remains. Minimal PIP flexion contracture remains     Edema. Measured in centimeters.    6/18/2024 7/22/24     Right  Left   Long:         P1 7.6  7.4    PIP suture  7.9   P2 7.2  7.1    DIP       P3        Hand ROM. Measured in degrees.    6/18/2024 7/3/2024  7/17/2024 7/22/24 8/5/24 8/13/24 8/15/24 9/5/24 9/17/24 10/3/2024     Right RIght    R           Right     Limited by post-op restrictions                 Post- heat                           Long:  MP     /82 83 83 82 93 89 89 93              PIP   -8/ 40 degrees to limits of exercise orthosis  -5/65 -12/61 -10/69 -15/71 -8/80 -18/84 -11/83 -9/92              DIP     /35 22 24 25 35 35 36 44              CHAN                                                       8/20/2024 8/20/2024  9/17/2024 10/3/2024     Left   Right   Right Right      113, 103, 108  Avg =108 48, 50, 65  Avg = 54  55, 58, 67  Avg = 60 81, 76, 85  Avg = 81   Lat pinch            3pt pinch  17 16.5  21     2 pt pinch                             Treatment     Theo received the treatments listed below:      Supervised Modality:                                                      Patient received fluido therapy to left hand for 10 minutes to increase blood flow, circulation, pain management and for tissue elasticity prior to therex      Therapeutic Exercise: 14 min     LLPS: PROM PIP extension, Isolated DIP flexion, Hook, straight fist, full fist 20+ reps each  AROM: IP joint blocking  10+ reps each  AAROM: hooks, Straight fists, full fists  15+ reps        Therapeutic activities x 19 min        BTE Simulator II                   Tool Plane of motion and time                   504   Wrist flex, wrist ext 45 sec ea   302  Rad dev, ulnar dev 45 sec   162  x 60 sec   162 lat pinch,3-jaw pinch 45 sec ea       Blue Flex Bar Twisting: Pro, sup, wrist flex, wrist extension, wrist rd, wrist ud 20 reps each    Patient Education and Home Exercises     Education provided:   - cont HEP  - Progress towards goals     Written Home Exercises Provided: Pt instructed to continue prior HEP.  Exercises were reviewed and Theo was able to demonstrate them prior to the end of the session.  Theo  demonstrated good  understanding of the home exercise program provided. See electronic medical record under Patient Instructions for exercises provided during therapy sessions.       Assessment     Good tolerance to tx. Able to close into a fist post heat and stretches. Feels he is coming along well.     Theo is progressing well towards his goals and there are no updates to goals at this time. Pt prognosis is Good.     Patient will continue to benefit from skilled outpatient occupational therapy to address the deficits listed in the problem list on initial evaluation provide patient/family education and to maximize patient's level of independence in the home and community environment.     Patient's spiritual, cultural and educational needs considered and patient agreeable to plan of care and goals.    Anticipated barriers to occupational therapy: Duration and nature of condiction     Goals:     New Short Term Goals Status = Long Term Goal Status: continue per initial plan of care.  Long Term Goals (LTGs); to be met by discharge.  LTG #1: Pt will report a pain level of 2 out of 10 with ADLs/IADLs  (Partially MET)        LTG #2: Pt will demo improved FOTO score by 20 points. (Progressing)  LTG #3: Pt will return to prior level of function for ADLs /IADLs (Progressing)    Plan     Updates/Grading for next session: cont as tolerated     LUCIO Toribio   10/10/2024

## 2024-10-15 ENCOUNTER — CLINICAL SUPPORT (OUTPATIENT)
Dept: REHABILITATION | Facility: HOSPITAL | Age: 54
End: 2024-10-15
Payer: COMMERCIAL

## 2024-10-15 DIAGNOSIS — M79.644 PAIN OF RIGHT MIDDLE FINGER: Primary | ICD-10-CM

## 2024-10-15 DIAGNOSIS — M25.641 STIFFNESS OF FINGER JOINT OF RIGHT HAND: ICD-10-CM

## 2024-10-15 PROCEDURE — 97022 WHIRLPOOL THERAPY: CPT

## 2024-10-15 PROCEDURE — 97530 THERAPEUTIC ACTIVITIES: CPT

## 2024-10-15 NOTE — PROGRESS NOTES
[facility]  Occupational Therapy Treatment Note and Discharge Summary     Date: 10/15/2024  Name: Theo Matthew Jr.  Clinic Number: [id number]    Therapy Diagnosis:   Encounter Diagnoses   Name Primary?    Pain of right middle finger Yes    Stiffness of finger joint of right hand      Physician: Maryjo Chamberlain MD    Physician Orders: Continue with ROM and strengthening  Medical Diagnosis from Referral: Medical Diagnosis: L03.011 (ICD-10-CM) - Cellulitis of right middle finger S66.929A (ICD-10-CM) - Laceration of tendon of finger M65.849 (ICD-10-CM) - Extensor tenosynovitis of finger DOS: 6/11/2024  Procedure:   Right long finger extensor tendon repair zone III, cpt 24559  2.  Right long finger tenosynovectomy EDC tendons, cpt 627143  3.  Right long finger extensor tenolysis, cpt 07731  4.  Right hand irrigation and sharp excisional debridement of skin, subcutaneous tissue, tendon and bone right long finger, cpt 48454     Evaluation Date: 6/18/2024    FOTO Function Score [id number]   53% on 6/14/2024  63% on 8/22/2024  67% on 9/24/2024  Functional Level Prior to Evaluation:  full use  Precautions:  Standard     Date of Return to MD: Nov 25/2024    Plan of Care Certification Period: extended to 11/4/2024  Insurance Authorization Period Expiration: 12/31/2024      Visit # / Visits authorized: 32/ 30  Time In:  10:30 am   Time Out: 11:05  Total Billable Time: 30 min          Subjective     Patient reports: doing well and feeling for discharge with HEP.    Pain: 0/10  Location: right hands      Objective     Objective Measures updated at progress report unless specified.  Update: Objective Measures updated at progress report unless specified.     Observation/Appearance:  Mild edema long finger, marquez at PIP remains. Minimal PIP flexion contracture remains     Edema. Measured in centimeters.    6/18/2024 7/22/24     Right  Left   Long:         P1 7.6  7.4    PIP suture  7.9   P2 7.2  7.1    DIP        P3       Hand ROM. Measured in degrees.    6/18/2024 7/3/2024  7/17/2024 7/22/24 8/5/24 8/13/24 8/15/24 9/5/24 9/17/24 10/3/2024     Right RIght    R           Right     Limited by post-op restrictions                 Post- heat                           Long:  MP     /82 83 83 82 93 89 89 93              PIP   -8/ 40 degrees to limits of exercise orthosis  -5/65 -12/61 -10/69 -15/71 -8/80 -18/84 -11/83 -9/92              DIP     /35 22 24 25 35 35 36 44              CHAN                                                       8/20/2024 8/20/2024  9/17/2024 10/3/2024     Left   Right   Right Right      113, 103, 108  Avg =108 48, 50, 65  Avg = 54  55, 58, 67  Avg = 60 81, 76, 85  Avg = 81   Lat pinch            3pt pinch  17 16.5  21     2 pt pinch                             Treatment     Theo received the treatments listed below:      Supervised Modality:                                                      Patient received fluido therapy to left hand for 10 minutes to increase blood flow, circulation, pain management and for tissue elasticity prior to therex      Therapeutic activities x 20 min        BTE Simulator II                   Tool Plane of motion and time                   504   Wrist flex, wrist ext 45 sec ea   302  Rad dev, ulnar dev 45 sec   162  x 60 sec   162 lat pinch,3-jaw pinch 45 sec ea       Blue Flex Bar Twisting: Pro, sup, wrist flex, wrist extension, wrist rd, wrist ud 20 reps each    Patient Education and Home Exercises     Education provided:   - cont HEP  - Progress towards goals     Written Home Exercises Provided: Pt instructed to continue prior HEP.  Exercises were reviewed and Theo was able to demonstrate them prior to the end of the session.  Theo demonstrated good  understanding of the home exercise program provided. See electronic medical record under Patient Instructions for exercises provided during therapy sessions.       Assessment     Theo  has achieved functional  rom and strength. Deficits do remain, however, he feels ready for discharge with HEP  Patient's spiritual, cultural and educational needs considered and patient agreeable to plan of care and goals.    Anticipated barriers to occupational therapy: Duration and nature of condiction     Goals:     New Short Term Goals Status = Long Term Goal Status: continue per initial plan of care.  Long Term Goals (LTGs); to be met by discharge.  LTG #1: Pt will report a pain level of 2 out of 10 with ADLs/IADLs  (MET)        LTG #2: Pt will demo improved FOTO score by 20 points. (Progressing)  LTG #3: Pt will return to prior level of function for ADLs /IADLs (MET)    Plan     Updates/Grading for next session: cont as tolerated     Chrissy Snowden OT   10/15/2024

## 2024-10-16 ENCOUNTER — OFFICE VISIT (OUTPATIENT)
Dept: UROLOGY | Facility: CLINIC | Age: 54
End: 2024-10-16
Payer: COMMERCIAL

## 2024-10-16 ENCOUNTER — PATIENT MESSAGE (OUTPATIENT)
Dept: RESEARCH | Facility: HOSPITAL | Age: 54
End: 2024-10-16
Payer: COMMERCIAL

## 2024-10-16 VITALS
HEART RATE: 68 BPM | BODY MASS INDEX: 29.46 KG/M2 | DIASTOLIC BLOOD PRESSURE: 88 MMHG | HEIGHT: 65 IN | SYSTOLIC BLOOD PRESSURE: 130 MMHG | WEIGHT: 176.81 LBS

## 2024-10-16 DIAGNOSIS — N42.89 PROSTATE MASS: Primary | ICD-10-CM

## 2024-10-16 DIAGNOSIS — C61 PROSTATE CANCER: ICD-10-CM

## 2024-10-16 PROCEDURE — 3075F SYST BP GE 130 - 139MM HG: CPT | Mod: CPTII,S$GLB,, | Performed by: UROLOGY

## 2024-10-16 PROCEDURE — 99215 OFFICE O/P EST HI 40 MIN: CPT | Mod: S$GLB,,, | Performed by: UROLOGY

## 2024-10-16 PROCEDURE — 99999 PR PBB SHADOW E&M-EST. PATIENT-LVL IV: CPT | Mod: PBBFAC,,, | Performed by: UROLOGY

## 2024-10-16 PROCEDURE — 1159F MED LIST DOCD IN RCRD: CPT | Mod: CPTII,S$GLB,, | Performed by: UROLOGY

## 2024-10-16 PROCEDURE — 3044F HG A1C LEVEL LT 7.0%: CPT | Mod: CPTII,S$GLB,, | Performed by: UROLOGY

## 2024-10-16 PROCEDURE — 3008F BODY MASS INDEX DOCD: CPT | Mod: CPTII,S$GLB,, | Performed by: UROLOGY

## 2024-10-16 PROCEDURE — 3079F DIAST BP 80-89 MM HG: CPT | Mod: CPTII,S$GLB,, | Performed by: UROLOGY

## 2024-10-17 ENCOUNTER — PATIENT MESSAGE (OUTPATIENT)
Dept: RESEARCH | Facility: HOSPITAL | Age: 54
End: 2024-10-17
Payer: COMMERCIAL

## 2024-10-18 ENCOUNTER — PATIENT MESSAGE (OUTPATIENT)
Dept: ORTHOPEDICS | Facility: CLINIC | Age: 54
End: 2024-10-18
Payer: COMMERCIAL

## 2024-10-18 DIAGNOSIS — M79.644 FINGER PAIN, RIGHT: Primary | ICD-10-CM

## 2024-10-25 ENCOUNTER — HOSPITAL ENCOUNTER (OUTPATIENT)
Dept: RADIOLOGY | Facility: HOSPITAL | Age: 54
Discharge: HOME | End: 2024-10-25
Attending: UROLOGY
Payer: COMMERCIAL

## 2024-10-25 DIAGNOSIS — C61 PROSTATE CANCER: ICD-10-CM

## 2024-10-25 PROCEDURE — 78815 PET IMAGE W/CT SKULL-THIGH: CPT | Mod: TC

## 2024-10-25 PROCEDURE — A9595 HC PIFLUFOLASTAT F-18, DX, PER 1 MCI: HCPCS | Mod: TB | Performed by: UROLOGY

## 2024-10-25 PROCEDURE — 78815 PET IMAGE W/CT SKULL-THIGH: CPT | Mod: 26,PI,, | Performed by: STUDENT IN AN ORGANIZED HEALTH CARE EDUCATION/TRAINING PROGRAM

## 2024-10-25 RX ADMIN — PIFLUFOLASTAT F-18 8.63 MILLICURIE: 80 INJECTION INTRAVENOUS at 12:10

## 2024-10-28 ENCOUNTER — TELEPHONE (OUTPATIENT)
Dept: UROLOGY | Facility: CLINIC | Age: 54
End: 2024-10-28
Payer: COMMERCIAL

## 2024-11-01 NOTE — PROGRESS NOTES
[facility] - Radiation Oncology Consult Note         Date of Service: 11/04/2024    Chief Complaint: prostate cancer     Reason for visit: consideration for radiation to the pelvis and L spine     Referring Physician: Dr Mckeon (urology)     Implantable devices: denies     Therapy to Date:  No radiation    Diagnosis/Assessment:   Theo Matthew Jr. is a 54 y.o. 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     PMH not significant    ECOG 1, no longer symptomatic    Plan   We discussed treatment options per NCCN guidelines v2024:  - EBRT + long-term ADT + SBRT to L spine  Dr. Thorne will discuss additional systemic therapy with the patient which can include but not be limited to chemotherapy     EBRT would consist of daily radiation treatments M-F, 70Gy in 28 fractions to the prostate gland + regional pelvic lymph nodes.   SBRT would consist of every other day radiation treatments 24 Gy in 2 fractions using SBRT set up     Risks, benefits, and side effects of radiotherapy were discussed.   Side effects include but are not limited to fatigue, erythema, hyperpigmentation, desquamation within the radiation field, more frequent urination, both during the day and at night, urgency with urination, hematuria, dysuria, and a sensation of incomplete emptying.   In addition, the patient will be at risk for increased frequency and/or loose bowel movements.   All of these side effects will go away in the short term.   In the long term, the patient is at risk for radiation cystitis, radiation proctitis, and erectile dysfunction.     - radiation consent signed  - last colonoscopy 2/18/2020, repeat in 10 years   - Epic- 26 survey  filled  - I will call the patient in March 2025 to review the plan    HPI:   Theo Matthew Jr. is a 54 y.o. man with oligometastasic prostate cancer after presenting with elevated PSA and gross hematuria.    Oncologic history:    9/3/2024 Dr Zuluaga visit  , UA with trace blood, no UTI.     9/3/2024 PSA 36    9/6/2024 CT Urogram   Large enhancing intraluminal bladder mass with partial obstruction of bilateral uterovesical junctions and n mild bilateral hydronephrosis.      9/25/2024 Dr Mckeon     9/27/2024 TRUS prostate biopsy, b/l ureteral stent placement (Dr Mckeon)  Findings  prostate size 50.52cc on transrectal US   TURP of extensive prostatic mass extending to the bladder lumen   bilateral ureteral stent placement   Pathology:   6/11 standard cores involved  TURP specimen involved GS4+5=9, GG5, with neuroendocrine features     10/25/2024 PSMA   Enlarged prostate with diffuse uptake SUV max 37   Several tracer avid pelvic lymph nodes SUV max 27   Tracer avid sclerotic focus in the right pedicle of L3 SUV max 9.1   Interval placement of bilateral double-J ureteral stents             Subjective:   In clinic the patient is alone    He reports feeling okay overall.      He reports dysuria, urgency, and urinary frequency but denies any hematuria.  He normally uses over-the-counter Tylenol pain  AUA score 16 (2,4,1,3,2,1,3) mixed    The patient denies major sexual function issues.  CECE score 24 (4,5,5,5,5) no ED    The patient denies major bowel function issues such as constipation diarrhea tenesmus or rectal bleeding.  He takes Dulcolax p.r.n. constipation  Reports daily normal bowel movements.    The patient denies other major complaints.     The patient denies any history of radiation therapy, implantable cardiac devices, or connective tissue disease.    Social history  from the [facility]  to [place]   retired from [employer]       Family History   Problem Relation Name Age of Onset     Seizures Father Theo Matthew Sr     Alcohol abuse Father Theo Matthew Sr     Hypertension Mother Mom     No Known Problems Sister      Cancer Maternal Grandmother Chen Hernandez         passed 83 pancreate cancer    Diabetes Maternal Grandfather Lory Reeves     Arthritis Paternal Grandmother Lillian Boggs     No Known Problems Paternal Grandfather         Current Outpatient Medications on File Prior to Visit   Medication Sig Dispense Refill    oxybutynin (DITROPAN) 5 MG Tab Take 1 tablet (5 mg total) by mouth 3 (three) times daily as needed (bladder spasms). 15 tablet 0     No current facility-administered medications on file prior to visit.     Review of patient's allergies indicates:  No Known Allergies    Past Surgical History:   Procedure Laterality Date    COLONOSCOPY  02/18/2020    COLONOSCOPY N/A 2/18/2020    Procedure: COLONOSCOPY/Suprep;  Surgeon: Agueda Jacques MD;  Location: [facility];  Service: Endoscopy;  Laterality: N/A;    IRRIGATION AND DEBRIDEMENT OF UPPER EXTREMITY Right 6/11/2024    Procedure: IRRIGATION AND DEBRIDEMENT, UPPER EXTREMITY;  Surgeon: Maryjo Chamberlain MD;  Location: [facility];  Service: Orthopedics;  Laterality: Right;    KNEE LIGAMENT RECONSTRUCTION Right     PROSTATE BIOPSY N/A 9/27/2024    Procedure: BIOPSY, PROSTATE/TRANSPERINEAL;  Surgeon: Isaiah Mckeon MD;  [facility];  Service: Urology;  Laterality: N/A;    REPAIR OF EXTENSOR TENDON Right 6/11/2024    Procedure: REPAIR, TENDON, EXTENSOR LONG FINGER;  Surgeon: Maryjo Chamberlain MD;  Location: [facility] OR;  Service: Orthopedics;  Laterality: Right;    RETROGRADE PYELOGRAPHY Bilateral 9/27/2024    Procedure: PYELOGRAM, RETROGRADE;  Surgeon: Isaiah Mckeon MD;  Location: [facility] OR [facility];  Service: Urology;  Laterality: Bilateral;    TENOLYSIS, EXTENSOR, HAND OR FINGER Right 6/11/2024    Procedure: TENOLYSIS, EXTENSOR, HAND OR FINGER;  Surgeon: Maryjo Chamberlain MD;  Location: [facility] OR;  Service: Orthopedics;  Laterality:  Right;    TENOSYNOVECTOMY Right 6/11/2024    Procedure: TENOSYNOVECTOMY, EXTENSOR TENDON LONG FINGER;  Surgeon: Maryjo Chamberlain MD;  Location: [facility];  Service: Orthopedics;  Laterality: Right;    TRANSRECTAL ULTRASOUND EXAMINATION N/A 9/27/2024    Procedure: ULTRASOUND, RECTAL APPROACH;  Surgeon: Isaiah Mckeon MD;  Location: [address];  Service: Urology;  Laterality: N/A;    TRANSURETHRAL RESECTION OF PROSTATE N/A 9/27/2024    Procedure: TURP (TRANSURETHRAL RESECTION OF PROSTATE);  Surgeon: Isaiah Mckeon MD;  Location: [facility] OR [address] [place] [address];  Service: Urology;  Laterality: N/A;    URETERAL STENT PLACEMENT Bilateral 9/27/2024    Procedure: INSERTION, STENT, URETER;  Surgeon: Isaiah Mckeon MD;  Location: [facility] OR [address] [place] [address];  Service: Urology;  Laterality: Bilateral;     Past Medical History:   Diagnosis Date    Internal hemorrhoid      Review of Systems  Negative unless as above    Objective:   Physical Exam  Vitals reviewed    Constitutional:       Appearance: Normal appearance.   HENT:      Head: Normocephalic and atraumatic.   Eyes:      Conjunctiva/sclera: Conjunctivae normal.   Pulmonary:      Effort: Pulmonary effort is normal.   Abdominal:      General: There is no distension.   Genitourinary:     Comments: YINA deferred  Musculoskeletal:         General: Normal range of motion.  Neurological:      General: No focal deficit present.      Mental Status: Alert and oriented  Psychiatric:         Mood and Affect: Mood normal.         Behavior: Behavior normal.      Imaging: I have personally reviewed the patient's available images and reports and summarized pertinent findings above in HPI.      Pathology: I have personally reviewed the patient's available pathology and summarized pertinent findings above in HPI.      Laboratory: I have personally reviewed the patient's available laboratory values and summarized pertinent findings above in HPI.      I spent approximately 60 minutes reviewing the available records  and evaluating the patient, out of which over 50% of the time was spent face to face with the patient in counseling and coordinating this patient's care.     Thank you for the opportunity to care for this patient. Please do not hesitate to contact me with any questions.     Cornel Gomez MD/PhD

## 2024-11-04 ENCOUNTER — OFFICE VISIT (OUTPATIENT)
Dept: HEMATOLOGY/ONCOLOGY | Facility: CLINIC | Age: 54
End: 2024-11-04
Payer: COMMERCIAL

## 2024-11-04 ENCOUNTER — LAB VISIT (OUTPATIENT)
Dept: LAB | Facility: HOSPITAL | Age: 54
End: 2024-11-04
Attending: HOSPITALIST
Payer: COMMERCIAL

## 2024-11-04 ENCOUNTER — OFFICE VISIT (OUTPATIENT)
Dept: RADIATION ONCOLOGY | Facility: CLINIC | Age: 54
End: 2024-11-04
Payer: COMMERCIAL

## 2024-11-04 VITALS
HEART RATE: 79 BPM | HEIGHT: 66 IN | DIASTOLIC BLOOD PRESSURE: 103 MMHG | BODY MASS INDEX: 28.9 KG/M2 | SYSTOLIC BLOOD PRESSURE: 149 MMHG | WEIGHT: 179.81 LBS

## 2024-11-04 VITALS
BODY MASS INDEX: 28.88 KG/M2 | WEIGHT: 179.69 LBS | HEART RATE: 79 BPM | HEIGHT: 66 IN | DIASTOLIC BLOOD PRESSURE: 103 MMHG | SYSTOLIC BLOOD PRESSURE: 149 MMHG

## 2024-11-04 DIAGNOSIS — C61 PROSTATE CANCER: ICD-10-CM

## 2024-11-04 DIAGNOSIS — Z79.818 ANDROGEN DEPRIVATION THERAPY: ICD-10-CM

## 2024-11-04 DIAGNOSIS — C79.51 METASTASIS TO BONE: ICD-10-CM

## 2024-11-04 DIAGNOSIS — N39.41 URGE INCONTINENCE: ICD-10-CM

## 2024-11-04 DIAGNOSIS — C77.5 MALIGNANT NEOPLASM METASTATIC TO INTRAPELVIC LYMPH NODE: ICD-10-CM

## 2024-11-04 DIAGNOSIS — C61 PROSTATE CANCER: Primary | ICD-10-CM

## 2024-11-04 DIAGNOSIS — Z79.899 LONG TERM CURRENT USE OF THERAPEUTIC DRUG: ICD-10-CM

## 2024-11-04 PROBLEM — C77.9 METASTASIS TO LYMPH NODES: Status: ACTIVE | Noted: 2024-11-04

## 2024-11-04 LAB
25(OH)D3+25(OH)D2 SERPL-MCNC: 18 NG/ML (ref 30–96)
ALBUMIN SERPL BCP-MCNC: 4.1 G/DL (ref 3.5–5.2)
ALP SERPL-CCNC: 69 U/L (ref 40–150)
ALT SERPL W/O P-5'-P-CCNC: 17 U/L (ref 10–44)
ANION GAP SERPL CALC-SCNC: 10 MMOL/L (ref 8–16)
AST SERPL-CCNC: 18 U/L (ref 10–40)
BILIRUB SERPL-MCNC: 0.5 MG/DL (ref 0.1–1)
BUN SERPL-MCNC: 10 MG/DL (ref 6–20)
CALCIUM SERPL-MCNC: 9.7 MG/DL (ref 8.7–10.5)
CHLORIDE SERPL-SCNC: 103 MMOL/L (ref 95–110)
CO2 SERPL-SCNC: 26 MMOL/L (ref 23–29)
COMPLEXED PSA SERPL-MCNC: 40.1 NG/ML (ref 0–4)
CREAT SERPL-MCNC: 1 MG/DL (ref 0.5–1.4)
ERYTHROCYTE [DISTWIDTH] IN BLOOD BY AUTOMATED COUNT: 13.9 % (ref 11.5–14.5)
EST. GFR  (NO RACE VARIABLE): >60 ML/MIN/1.73 M^2
GLUCOSE SERPL-MCNC: 91 MG/DL (ref 70–110)
HCT VFR BLD AUTO: 39.9 % (ref 40–54)
HGB BLD-MCNC: 13.1 G/DL (ref 14–18)
IMM GRANULOCYTES # BLD AUTO: 0.02 K/UL (ref 0–0.04)
MCH RBC QN AUTO: 29.3 PG (ref 27–31)
MCHC RBC AUTO-ENTMCNC: 32.8 G/DL (ref 32–36)
MCV RBC AUTO: 89 FL (ref 82–98)
NEUTROPHILS # BLD AUTO: 2.8 K/UL (ref 1.8–7.7)
PLATELET # BLD AUTO: 208 K/UL (ref 150–450)
PMV BLD AUTO: 11 FL (ref 9.2–12.9)
POTASSIUM SERPL-SCNC: 3.7 MMOL/L (ref 3.5–5.1)
PROT SERPL-MCNC: 8 G/DL (ref 6–8.4)
RBC # BLD AUTO: 4.47 M/UL (ref 4.6–6.2)
SODIUM SERPL-SCNC: 139 MMOL/L (ref 136–145)
TEMPUS BLOOD ADD-ON: NORMAL
TESTOST SERPL-MCNC: 593 NG/DL (ref 304–1227)
WBC # BLD AUTO: 5.61 K/UL (ref 3.9–12.7)

## 2024-11-04 PROCEDURE — 1159F MED LIST DOCD IN RCRD: CPT | Mod: CPTII,S$GLB,, | Performed by: HOSPITALIST

## 2024-11-04 PROCEDURE — 99999 PR PBB SHADOW E&M-EST. PATIENT-LVL V: CPT | Mod: PBBFAC,,, | Performed by: HOSPITALIST

## 2024-11-04 PROCEDURE — 1160F RVW MEDS BY RX/DR IN RCRD: CPT | Mod: CPTII,S$GLB,, | Performed by: STUDENT IN AN ORGANIZED HEALTH CARE EDUCATION/TRAINING PROGRAM

## 2024-11-04 PROCEDURE — 84403 ASSAY OF TOTAL TESTOSTERONE: CPT | Performed by: HOSPITALIST

## 2024-11-04 PROCEDURE — 99205 OFFICE O/P NEW HI 60 MIN: CPT | Mod: S$GLB,,, | Performed by: HOSPITALIST

## 2024-11-04 PROCEDURE — 3077F SYST BP >= 140 MM HG: CPT | Mod: CPTII,S$GLB,, | Performed by: STUDENT IN AN ORGANIZED HEALTH CARE EDUCATION/TRAINING PROGRAM

## 2024-11-04 PROCEDURE — 1159F MED LIST DOCD IN RCRD: CPT | Mod: CPTII,S$GLB,, | Performed by: STUDENT IN AN ORGANIZED HEALTH CARE EDUCATION/TRAINING PROGRAM

## 2024-11-04 PROCEDURE — 3080F DIAST BP >= 90 MM HG: CPT | Mod: CPTII,S$GLB,, | Performed by: STUDENT IN AN ORGANIZED HEALTH CARE EDUCATION/TRAINING PROGRAM

## 2024-11-04 PROCEDURE — 99999 PR PBB SHADOW E&M-EST. PATIENT-LVL III: CPT | Mod: PBBFAC,,, | Performed by: STUDENT IN AN ORGANIZED HEALTH CARE EDUCATION/TRAINING PROGRAM

## 2024-11-04 PROCEDURE — 80053 COMPREHEN METABOLIC PANEL: CPT | Performed by: HOSPITALIST

## 2024-11-04 PROCEDURE — 3008F BODY MASS INDEX DOCD: CPT | Mod: CPTII,S$GLB,, | Performed by: STUDENT IN AN ORGANIZED HEALTH CARE EDUCATION/TRAINING PROGRAM

## 2024-11-04 PROCEDURE — 3008F BODY MASS INDEX DOCD: CPT | Mod: CPTII,S$GLB,, | Performed by: HOSPITALIST

## 2024-11-04 PROCEDURE — 3077F SYST BP >= 140 MM HG: CPT | Mod: CPTII,S$GLB,, | Performed by: HOSPITALIST

## 2024-11-04 PROCEDURE — 99205 OFFICE O/P NEW HI 60 MIN: CPT | Mod: S$GLB,,, | Performed by: STUDENT IN AN ORGANIZED HEALTH CARE EDUCATION/TRAINING PROGRAM

## 2024-11-04 PROCEDURE — 84153 ASSAY OF PSA TOTAL: CPT | Performed by: HOSPITALIST

## 2024-11-04 PROCEDURE — 3080F DIAST BP >= 90 MM HG: CPT | Mod: CPTII,S$GLB,, | Performed by: HOSPITALIST

## 2024-11-04 PROCEDURE — 36415 COLL VENOUS BLD VENIPUNCTURE: CPT | Performed by: HOSPITALIST

## 2024-11-04 PROCEDURE — 3044F HG A1C LEVEL LT 7.0%: CPT | Mod: CPTII,S$GLB,, | Performed by: HOSPITALIST

## 2024-11-04 PROCEDURE — 3044F HG A1C LEVEL LT 7.0%: CPT | Mod: CPTII,S$GLB,, | Performed by: STUDENT IN AN ORGANIZED HEALTH CARE EDUCATION/TRAINING PROGRAM

## 2024-11-04 PROCEDURE — 82306 VITAMIN D 25 HYDROXY: CPT | Performed by: HOSPITALIST

## 2024-11-04 PROCEDURE — 85027 COMPLETE CBC AUTOMATED: CPT | Performed by: HOSPITALIST

## 2024-11-04 RX ORDER — OXYBUTYNIN CHLORIDE 5 MG/1
5 TABLET ORAL 3 TIMES DAILY PRN
Qty: 30 TABLET | Refills: 2 | Status: SHIPPED | OUTPATIENT
Start: 2024-11-04

## 2024-11-04 RX ORDER — BICALUTAMIDE 50 MG/1
50 TABLET, FILM COATED ORAL DAILY
Qty: 30 TABLET | Refills: 0 | Status: SHIPPED | OUTPATIENT
Start: 2024-11-04 | End: 2025-11-04

## 2024-11-04 RX ORDER — VIT C/E/ZN/COPPR/LUTEIN/ZEAXAN 250MG-90MG
1000 CAPSULE ORAL DAILY
Qty: 30 CAPSULE | Refills: 11 | Status: SHIPPED | OUTPATIENT
Start: 2024-11-04

## 2024-11-04 RX ORDER — PREDNISONE 5 MG/1
5 TABLET ORAL DAILY
Qty: 30 TABLET | Refills: 5 | Status: ACTIVE | OUTPATIENT
Start: 2024-11-04

## 2024-11-04 RX ORDER — ABIRATERONE 500 MG/1
1000 TABLET ORAL DAILY
Qty: 60 TABLET | Refills: 5 | Status: ACTIVE | OUTPATIENT
Start: 2024-11-04

## 2024-11-04 NOTE — PROGRESS NOTES
[facility]: New patient visit  Best Contact Phone Number(s): [phone] (home)       Cancer/Stage/TNM:    Cancer Staging   Prostate cancer  Staging form: Prostate, AJCC 8th Edition  - Clinical stage from 9/27/2024: Stage IVB (cT1c, cN1, cM1b, PSA: 36, Grade Group: 5) - Signed by Cornel Gomez MD on 11/4/2024        Reason for visit:      Molecular:  Germline Testing: N/A  Somatic Testing: N/A    Treatment History:   N/A     HPI:   Theo Matthew Jr. is a 54 y.o. male presenting with hematuria and elevated PSA levels. He was previously evaluated by Dr. Bedoya for a PSA level of 17 in 2022, at which time a prostate biopsy was recommended; however, the patient declined the procedure. His PSA increased to 22.9 in 2023, and he did not follow up with urology.  He started to experienced occasional gross hematuria without associated pain in May 2024. His PSA reached 37.3 in May 2024, with a repeat in September 2024 showing 36. Post-void residual volume measured at 232 mL, and urinalysis revealed trace blood but no signs of a urinary tract infection. The patient reported no lower urinary tract symptoms, significant weight loss, or bone pain.  A cytology report from September 2024 indicated suspicious cells for high-grade urothelial carcinoma. A CT urogram performed in September 2024 revealed a large enhancing intraluminal bladder mass, which appears to cause partial obstruction of the bilateral ureterovesical junctions and mild bilateral hydronephrosis. The mass is suspected to originate from the central portion of the prostate and extend into the bladder. There is no evidence of lymphadenopathy or metastasis.  The patient also has elevated creatinine levels, suggesting a recent decline in kidney function. On September 11, he presented to the emergency room, reporting blood in his stool, urinary urgency, and frequency. He noted difficulty in holding urine and a sensation of incomplete bladder  emptying. He was diagnosed with a urinary tract infection and prescribed ciprofloxacin.  On September 27, the patient underwent a transurethral resection of the prostate (TURP) to address an extensive prostatic mass that extended into the bladder lumen and overlaid the bilateral ureteral orifices. Bilateral ureteral stents were placed, and prostate biopsies were sent for pathology, which confirmed prostatic adenocarcinoma with neuroendocrine features.  A PET CT performed on October 25, 2024, showed diffuse increased tracer uptake throughout the prostate consistent with prostate cancer, as well as tracer-avid pelvic lymph nodes and an L3 sclerotic lesion, raising concerns for metastatic disease. The patient has been referred to medical oncology as part of a multidisciplinary approach to manage his malignancy.    Interval History  Patient presents to the clinic alone. He complains of frequency about 6-7x daily and 3-4x nightly. He also endorses urge incontinence about 2-3x per week. He occasionally has weak urinary stream but denies any hesitancy, hematuria, dysuria or feeling of incomplete emptying. He denies any weight loss or new bone pain.     09/06/24: CTU  Impression:  1. Large enhancing intraluminal bladder mass that appears to result in partial obstruction of the bilateral uterovesical junctions and results in mild bilateral hydronephrosis.  This mass is thought to originate from the central portion of the prostate gland and extends intraluminally into the bladder.  This is the likely cause of hematuria.  Please see discussion above.  2. Remaining findings as discussed above.    09/27/24: Cystoscopy with bilateral ureteral stent splacement; TURP; and transperineal biopsy of the prostate    09/27/24: Pathology  Prostate acinar adenocarcinoma involving 6 template cores up to [place] 4+3. Prostate mass resection with [place] 4+5 acinar adenocarcinoma with associated neuroendocrine features.    10/25/24: PSMA PET  CT  Impression:  - Diffuse increased tracer uptake throughout prostate reported prostate cancer.  Tracer avid pelvic lymph nodes and L3 sclerotic lesion, worrisome for metastatic disease.  Index lesions as detailed above.    Recent medical course notable for right hand soft tissue infetion following laceration from a knife while fishing.     History has been obtained by chart review and discussion with the patient.     Oncology History   Prostate cancer   9/27/2024 Cancer Staged    Staging form: Prostate, AJCC 8th Edition  - Clinical stage from 9/27/2024: Stage IVB (cT1c, cN1, cM1b, PSA: 36, Grade Group: 5)     11/4/2024 Initial Diagnosis    Prostate cancer     11/4/2024 -  Chemotherapy    Treatment Summary   Plan Name: OP ABIRATERONE DAILY PREDNISONE BID  Treatment Goal: Palliative  Status: Active  Start Date: 11/4/2024  End Date: 11/4/2024  Provider: Theo Thorne MD  Chemotherapy: abiraterone (ZYTIGA) 500 mg Tab, 1,000 mg, Oral, Daily, 1 of 1 cycle, Start date: 11/4/2024, End date: --           Past Medical History:   Diagnosis Date    Internal hemorrhoid          Past Surgical History:   Procedure Laterality Date    COLONOSCOPY  02/18/2020    COLONOSCOPY N/A 2/18/2020    Procedure: COLONOSCOPY/Suprep;  Surgeon: Agueda Jacques MD;  [facility];  Service: Endoscopy;  Laterality: N/A;    IRRIGATION AND DEBRIDEMENT OF UPPER EXTREMITY Right 6/11/2024    Procedure: IRRIGATION AND DEBRIDEMENT, UPPER EXTREMITY;  Surgeon: Maryjo Chamberlain MD;  Location: [facility] OR;  Service: Orthopedics;  Laterality: Right;    KNEE LIGAMENT RECONSTRUCTION Right     PROSTATE BIOPSY N/A 9/27/2024    Procedure: BIOPSY, PROSTATE/TRANSPERINEAL;  Surgeon: Isaiah Mckeon MD;  Location: [address];  Service: Urology;  Laterality: N/A;    REPAIR OF EXTENSOR TENDON Right 6/11/2024    Procedure: REPAIR, TENDON, EXTENSOR LONG FINGER;  Surgeon: Maryjo Chamberlain MD;  Location: [facility] OR;  Service: Orthopedics;  Laterality: Right;     RETROGRADE PYELOGRAPHY Bilateral 9/27/2024    Procedure: PYELOGRAM, RETROGRADE;  Surgeon: Isaiah Mckeon MD;  Location: [facility];  Service: Urology;  Laterality: Bilateral;    TENOLYSIS, EXTENSOR, HAND OR FINGER Right 6/11/2024    Procedure: TENOLYSIS, EXTENSOR, HAND OR FINGER;  Surgeon: Maryjo Chamberlain MD;  Location: [facility] OR;  Service: Orthopedics;  Laterality: Right;    TENOSYNOVECTOMY Right 6/11/2024    Procedure: TENOSYNOVECTOMY, EXTENSOR TENDON LONG FINGER;  Surgeon: Maryjo Chamberlain MD;  Location: [facility] OR;  Service: Orthopedics;  Laterality: Right;    TRANSRECTAL ULTRASOUND EXAMINATION N/A 9/27/2024    Procedure: ULTRASOUND, RECTAL APPROACH;  Surgeon: Isaiah Mckeon MD;  [facility];  Service: Urology;  Laterality: N/A;    TRANSURETHRAL RESECTION OF PROSTATE N/A 9/27/2024    Procedure: TURP (TRANSURETHRAL RESECTION OF PROSTATE);  Surgeon: Isaiah Mckeon MD;  [facility];  Service: Urology;  Laterality: N/A;    URETERAL STENT PLACEMENT Bilateral 9/27/2024    Procedure: INSERTION, STENT, URETER;  Surgeon: Isaiah Mckeon MD;  [facility];  Service: Urology;  Laterality: Bilateral;         Review of patient's allergies indicates:  No Known Allergies      Current Outpatient Medications   Medication Sig Dispense Refill    abiraterone (ZYTIGA) 500 mg Tab Take 2 tablets (1,000 mg total) by mouth once daily Take as directed on an empty stomach.. 60 tablet 5    bicalutamide (CASODEX) 50 MG Tab Take 1 tablet (50 mg total) by mouth once daily. 30 tablet 0    cholecalciferol, vitamin D3, (VITAMIN D3) 25 mcg (1,000 unit) capsule Take 1 capsule (1,000 Units total) by mouth once daily. 30 capsule 11    oxybutynin (DITROPAN) 5 MG Tab Take 1 tablet (5 mg total) by mouth 3 (three) times daily as needed (bladder spasms). 30 tablet 2    predniSONE (DELTASONE) 5 MG tablet Take 1 tablet (5 mg total) by mouth once daily. Take as directed with water on an empty stomach. 30 tablet 5  "    No current facility-administered medications for this visit.        Objective:      Physical Exam:   BP (!) 149/103 (BP Location: Right arm, Patient Position: Sitting)   Pulse 79   Ht 5' 6" (1.676 m)   Wt 81.5 kg (179 lb 10.8 oz)   BMI 29.00 kg/m²       ECOG Performance status: (0) Fully active, able to carry on all predisease performance without restriction     Physical Exam  Constitutional:       General: He is not in acute distress.     Appearance: He is not ill-appearing.   Cardiovascular:      Rate and Rhythm: Normal rate and regular rhythm.   Pulmonary:      Effort: Pulmonary effort is normal.      Breath sounds: Normal breath sounds.   Abdominal:      General: Bowel sounds are normal.      Palpations: Abdomen is soft.   Neurological:      General: No focal deficit present.      Mental Status: He is oriented to person, place, and time.   Psychiatric:         Mood and Affect: Mood normal.         Behavior: Behavior normal.         Thought Content: Thought content normal.          Recent Labs:   Lab Results   Component Value Date    WBC 6.30 10/14/2024    RBC 3.97 (L) 10/14/2024    HGB 12.0 (L) 10/14/2024    HCT 33.8 (L) 10/14/2024     10/14/2024     10/14/2024    K 3.9 10/14/2024     10/14/2024    CO2 25 10/14/2024     10/14/2024    BUN 11 10/14/2024    CREATININE 1.1 10/14/2024    CALCIUM 9.4 10/14/2024    BILITOT 0.4 10/14/2024    AST 17 10/14/2024    ALT 17 10/14/2024          Lab Results   Component Value Date    PSADIAG 17.2 (H) 01/06/2022    PSATOTAL 36.0 (H) 09/03/2024    PSA 37.3 (H) 05/02/2024    PSA 22.9 (H) 03/21/2023    PSA 14.9 (H) 12/08/2021        Cardiovascular Screening:  Primary care physician: Tenisha Becerra MD      The 10-year ASCVD risk score (Jose WINN, et al., 2019) is: 9.5%    Values used to calculate the score:      Age: 54 years      Sex: Male      Is Non- : Yes      Diabetic: No      Tobacco smoker: No      Systolic Blood " "Pressure: 149 mmHg      Is BP treated: No      HDL Cholesterol: 31 mg/dL      Total Cholesterol: 167 mg/dL    EKG: No results found for this or any previous visit.    Body mass index is 29 kg/m².    Lab Results   Component Value Date    CHOL 167 06/04/2024    LDLCALC 112.6 06/04/2024    HDL 31 (L) 06/04/2024    TRIG 117 06/04/2024    HGBA1C 5.8 (H) 06/04/2024          Bone Health    No results found for: "[id number]", "APWWHGEF42QL"     No results found for this or any previous visit.         PSMA PET IMAGING+     Results for orders placed during the hospital encounter of 10/25/24    NM PET CT F 18 PYL PSMA, Midthigh to Vertex    Impression  Diffuse increased tracer uptake throughout prostate reported prostate cancer.  Tracer avid pelvic lymph nodes and L3 sclerotic lesion, worrisome for metastatic disease.  Index lesions as detailed above.    Electronically signed by resident: Jules Marin  Date:    10/25/2024  Time:    14:17    Electronically signed by: Javy Rdz  Date:    10/25/2024  Time:    15:20       I have personally reviewed the above imaging.     Path:   Reviewed pathology as documented above.      Diagnoses:     1. Prostate cancer    2. Androgen deprivation therapy    3. Long term current use of therapeutic drug    4. Urge incontinence          Assessment and Plan:     1. Prostate cancer  Assessment & Plan:  -Patient has low volume stage IV metastatic prostate cancer with neuroendocrine features.   -We will recommend treatment based on the CASCARA trial starting hormone therapy with androgen deprivation shots (ADT, or Lupron). Additionally, well include hormone pills--abiraterone and prednisone and chemotherapy using a combination of cabazitaxel and carboplatin within the next month. We can initiate this treatment in the coming weeks. After approximately four months of chemotherapy, we can consider radiation therapy to help push the cancer toward remission.  -In the meantime, pt to start " Bicalutamide pills at a dose of 50 mg daily.  -Referral to genetic counseling  -[facility] blood NGS testing  -Vitamin D 1000 IU daily prescribed  -FU in 2 weeks for Lupron and chemotherapy education.    Orders:  -     Ambulatory referral/consult to Oncology  -     Physician communication order; Standing  -     Physician communication order; Standing  -     Physician communication order; Standing  -     predniSONE (DELTASONE) 5 MG tablet; Take 1 tablet (5 mg total) by mouth once daily. Take as directed with water on an empty stomach.  Dispense: 30 tablet; Refill: 5  -     abiraterone (ZYTIGA) 500 mg Tab; Take 2 tablets (1,000 mg total) by mouth once daily Take as directed on an empty stomach..  Dispense: 60 tablet; Refill: 5  -     bicalutamide (CASODEX) 50 MG Tab; Take 1 tablet (50 mg total) by mouth once daily.  Dispense: 30 tablet; Refill: 0  -     Ambulatory referral/consult to Genetics; Future; Expected date: 11/11/2024  -     cholecalciferol, vitamin D3, (VITAMIN D3) 25 mcg (1,000 unit) capsule; Take 1 capsule (1,000 Units total) by mouth once daily.  Dispense: 30 capsule; Refill: 11  -     DXA Bone Density Axial Skeleton 1 or more sites; Future; Expected date: 11/04/2024  -     CBC Oncology; Standing  -     Comprehensive Metabolic Panel; Standing  -     Prostate Specific Antigen, Diagnostic; Standing  -     Testosterone; Standing  -     Tumor NGS Blood; Future; Expected date: 11/04/2024  -     Tumor NGS Blood Add-on; Future; Expected date: 11/04/2024  -     Vitamin D; Future; Expected date: 11/04/2024    2. Androgen deprivation therapy  -     cholecalciferol, vitamin D3, (VITAMIN D3) 25 mcg (1,000 unit) capsule; Take 1 capsule (1,000 Units total) by mouth once daily.  Dispense: 30 capsule; Refill: 11  -     DXA Bone Density Axial Skeleton 1 or more sites; Future; Expected date: 11/04/2024  -     Testosterone; Standing  -     Vitamin D; Future; Expected date: 11/04/2024    3. Long term current use of therapeutic  drug  -     Vitamin D; Future; Expected date: 11/04/2024    4. Urge incontinence  -     oxybutynin (DITROPAN) 5 MG Tab; Take 1 tablet (5 mg total) by mouth 3 (three) times daily as needed (bladder spasms).  Dispense: 30 tablet; Refill: 2          Follow up:   Route Chart for Scheduling  Med Onc Route Chart for Scheduling     Treatment Plan Information   OP ABIRATERONE DAILY PREDNISONE BID Theo Thorne MD   Associated diagnosis: Prostate cancer Stage IVB cT1c, cN1, cM1b, PSA: 36, Grade Group: 5 noted on 11/4/2024   Line of treatment: First Line  Treatment Goal: Palliative     Upcoming Treatment Dates - OP ABIRATERONE DAILY PREDNISONE BID    No upcoming days in selected categories.         The above information has been reviewed with the patient and all questions have been answered to their apparent satisfaction.  They understand that they can call the clinic with any questions.    Teena Womack M.D  Hematology and Oncology Fellow  [facility] [place] [address]  Email: [email or web address]  Phone: o) [phone] (c) [phone]

## 2024-11-04 NOTE — ASSESSMENT & PLAN NOTE
-Patient has low volume stage IV metastatic prostate cancer with neuroendocrine features.   -We will recommend treatment based on the CASCARA trial starting hormone therapy with androgen deprivation shots (ADT, or Lupron). Additionally, well include hormone pills--abiraterone and prednisone and chemotherapy using a combination of cabazitaxel and carboplatin within the next month. We can initiate this treatment in the coming weeks. After approximately four months of chemotherapy, we can consider radiation therapy to help push the cancer toward remission.  -In the meantime, pt to start Bicalutamide pills at a dose of 50 mg daily.  -Referral to genetic counseling  -Tempus blood NGS testing  -Vitamin D 1000 IU daily prescribed  -FU in 2 weeks for Lupron and chemotherapy education.

## 2024-11-04 NOTE — PATIENT INSTRUCTIONS
We discussed your recent diagnosis of a metastatic stage IV prostate cancer. On PSMA PET CT scan, you have a lower volume of metastatic disease, which is why you are not too symptomatic. However, the cancer does appear to be aggressive on bladder biopsy ('neuroendocrine features'). We discussed treatment paradigms for metastatic prostate cancer. Typically we rely on hormone therapy with or without chemotherapy to control the cancer. In some cases with lower amounts of metastatic disease we can also use radiation treatments to try to lead to a remission.     Specifically, I recommend starting hormone therapy with androgen deprvivation shots (ADT, or 'Lupron). We will also use additional hormone pills called abiraterone + prednisone. We can start this treatment in the next few weeks. In the meantime you can use bicalutamide pills 50mg daily. We will also plan on starting chemotherapy with combination cabazitaxel + carboplatin in the next month or so. After completing about 4 months of chemotherapy, can plan for radiation to further drive the cancer into a potential remission.     We discussed typical side effects of hormone therapy including fatigue, weight gain, loss libido, forgetfulness, hot flashes, and muscle loss. Other longer term risks can include increased risk of cardiovascular disease and osteoporosis.    - We will schedule you an appointment with our genetics counseling clinic to discuss screening for the presence of a high risk cancer gene. We will also check a blood test called a 'Tempus' test to look for cancer DNA in your blood.    - Please start taking vitamin D 1000 IU daily; I called a prescription into your local pharmacy  - Please get 4 servings of calcium daily; if you cannot get 4 servings of dairy daily you can take two over the count Tums instead  - We will schedule a bone mineral density test        - Start bicatluamide 50mg daily for the next month  - Prescription for abiraterone 1000mg by  mouth daily on an empty stomach and prednisone 5mg by mouth daily called into [facility]; they will contact you to arrange delivery in the next few days  - Plan to start first Lupron shot in two weeks; will also discuss chemotherapy in more depth  - Wait to start abiraterone and prednisone until you see us in clinic for your Lupron shot  - We will need to check your labs every two weeks for 2 months after starting abiraterone. Afterwards we can space out how frequently we check your labs        Excellent patient resources for living with prostate cancer can be found at [email or web address]

## 2024-11-04 NOTE — PROGRESS NOTES
[facility]: New patient visit  Best Contact Phone Number(s): [phone] (home)       Cancer/Stage/TNM:    Cancer Staging   Prostate cancer  Staging form: Prostate, AJCC 8th Edition  - Clinical stage from 9/27/2024: Stage IVB (cT1c, cN1, cM1b, PSA: 36, Grade Group: 5) - Signed by Cornel Gomez MD on 11/4/2024        Reason for visit:  de mark metastatic prostate cancer    Molecular:  Germline Testing: N/A  Somatic Testing: N/A    Treatment History:   N/A     HPI:   Theo Matthew Jr. is a 54 y.o. male found to have de mark low volume metastatic prostate cancer 09/2024 in the setting of hematuria and several years of elevated PSA. Initial staging with extensive local progression into the bladder wityh associated pelvic adenopathy and a single L3 osseous metastases. Pathology with Jaffrey 4+5 acinar adenocarcinoma with neuroendocrine features. He presents to medical oncology clinic for initial evaluation.    Ongoing urinary frequency up to 6-7x per day with urge incontinence a few time a week. Associated nocturia 3-4x per night. No hematuria. No john obstructive symptoms. Reportns normal BM. No CP, SOB or cough. Energy is good.      Recent medical course notable for right hand soft tissue infetion following laceration from a knife while fishing. Recently completed extended course of abx. Otherwise he is quite healthy. No known DM2. No known MI or stroke.     History has been obtained by chart review and discussion with the patient.    Plan:    Patient with lower volume oligometastatic disease to pelvic LN and L3. However, aggressive biology with Anthony 4+5 disease and neuroendocrine features with aggressive invasion into the bladder. Given he is young and otherwise healthy, I favor an aggressive upfront course with quadruplet therapy with ADT + abiraterone/prednisone + cabazitaxel  + carboplatin. Cabazitaxel + carboplatin has been a good regimen in aggressive variant disease, and phase  II CASCARA trial has shown good tolerability in the castrate sensitive space. After completion of induction chemotherapy course, would plan for consolidative RT treatment, per Dr. Gomez.    - Start bicalutamide today  - Plan to start ADT + abiraterone in ~2 weeks  - Also follow up for cabazitaxel + carboplatin education in ~2 weeks  - Anticipate starting chemo in ~4 weeks  - DEXA scan; start vitamin D  - Referral to cancer genetics  - Send Tempus xF  - Oxybutynin for urinary urgency  - FU 2 weeks         Follow up:   Route Chart for Scheduling    Med Onc Chart Routing      Follow up with physician 4 weeks.   Follow up with [facility] 2 weeks. Shun 2 weeks - abiraterone / Lupron start and cabazitaxel + carboplatin education   Infusion scheduling note New or changed treatment   cabazitaxel + carboplatin 4 weeks   Injection scheduling note Lupron in 2 weeks   Labs CBC, CMP and PSA   Scheduling:  Preferred lab:  Lab interval:     Imaging DXA scan      Pharmacy appointment    Other referrals                   Treatment Plan Information   OP PROSTATE CABAZItaxel 20 MG/M2 and CARBOplatin AUC 4 Q3W Theo Thorne MD   Associated diagnosis: Prostate cancer Stage IVB cT1c, cN1, cM1b, PSA: 36, Grade Group: 5 noted on 11/4/2024   Line of treatment: Second Line  Treatment Goal: Palliative     Upcoming Treatment Dates - OP PROSTATE CABAZItaxel 20 MG/M2 and CARBOplatin AUC 4 Q3W    11/6/2024       Take Home Chemotherapy       predniSONE (DELTASONE) 5 MG tablet  11/7/2024       Pre-Medications       diphenhydrAMINE (BENADRYL) 25 mg in NS 50 mL IVPB       famotidine (PF) injection 20 mg       palonosetron 0.25mg/dexAMETHasone 12mg in NS IVPB 0.25 mg 50 mL       aprepitant (Cinvanti) injection 130 mg       Chemotherapy       cabazitaxeL (JEVTANA) 39 mg in 0.9% NaCl SolP 253.9 mL chemo infusion       CARBOplatin (PARAPLATIN) 485 mg in 0.9% NaCl 298.5 mL chemo infusion       Supportive Care       prochlorperazine injection Soln 10  mg  11/28/2024       Pre-Medications       diphenhydrAMINE (BENADRYL) 25 mg in NS 50 mL IVPB       famotidine (PF) injection 20 mg       palonosetron 0.25mg/dexAMETHasone 12mg in NS IVPB 0.25 mg 50 mL       aprepitant (Cinvanti) injection 130 mg       Chemotherapy       cabazitaxeL (JEVTANA) 39 mg in 0.9% NaCl SolP 253.9 mL chemo infusion       CARBOplatin (PARAPLATIN) 485 mg in 0.9% NaCl 298.5 mL chemo infusion       Supportive Care       prochlorperazine injection Soln 10 mg  12/19/2024       Pre-Medications       diphenhydrAMINE (BENADRYL) 25 mg in NS 50 mL IVPB       famotidine (PF) injection 20 mg       palonosetron 0.25mg/dexAMETHasone 12mg in NS IVPB 0.25 mg 50 mL       aprepitant (Cinvanti) injection 130 mg       Chemotherapy       cabazitaxeL (JEVTANA) 39 mg in 0.9% NaCl SolP 253.9 mL chemo infusion       CARBOplatin (PARAPLATIN) 485 mg in 0.9% NaCl 298.5 mL chemo infusion       Supportive Care       prochlorperazine injection Soln 10 mg         The above information has been reviewed with the patient and all questions have been answered to their apparent satisfaction.  They understand that they can call the clinic with any questions.    Theo Thorne MD MPH  Staff Physician     [facility]  [address] [place] [id number]  Email: [email or web address]  Phone: o) [phone] (c) [phone]

## 2024-11-05 ENCOUNTER — TUMOR BOARD CONFERENCE (OUTPATIENT)
Dept: UROLOGY | Facility: CLINIC | Age: 54
End: 2024-11-05
Payer: COMMERCIAL

## 2024-11-05 NOTE — PROGRESS NOTES
[facility]      GENITOURINARY MULTIDISCIPLINARY TUMOR BOARD  PATIENT REVIEW FORM     CLINIC #: [id number]  DATE: 11/05/2024    TUMOR SITE:   Prostate     ATTENDING:   Isaiah Mckeon MD; Cornel Gomez MD; Theo Thorne MD     PATIENT SUMMARY:   Theo Matthew Jr. is a 54 y.o. 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 Previously undergone a TURP and bilateral ureteral stent placement on 9/27/2024. PSMA showed regional lymphadenopathy and tracer avid sclerotic focus in the R pedicle of L3 SUV max 9.1. Discussed EBRT + ADT + abiraterone/prednisone + cabazitaxel  + carboplatin + SBRT to L spine. Low volume oligometastatic disease.    DISCUSSION:  Correct treatment modality? Previously discussed EBRT + ADT + abiraterone/prednisone + cabazitaxel  + carboplatin + SBRT to L spine for his oligometastatic disease.  Would be aggressive with chemotherapy given that he is young and overall healthy, although his path is more like prostatic adenocarcinoma than neuroendocrine tumor.     PERFORMANCE STATUS:  ECOG 0    Estimated GFR/CKD Stage: >60 (Cr 1.0)    Clinical/Pathologic Stage (TNM): [id number]     FACULTY IN ATTENDANCE:    Urologic Oncology: Nav Marie MD; Mehul Guerrero MD; Jeff Lu MD, Adalberto Bailon MD; Isaiah Mckeon MD     Radiation Oncology: Darrell Vallejo MD; Cornel Gomez MD    Hematology/Oncology: Terra Guerrero MD; Theo Thorne MD; Marie Gonzalez MD       CONSULT NEEDED:     [x] Urologic Oncology    [x] Hem/Onc    [x] Rad/Onc   []     [] Physical/Occupational Therapy    [] Psychology  [] Other: ___________________    [x] Treatment Guidelines (NCCN and AUA) reviewed and care planned is consistent with guidelines.    PRESENTATION AT CANCER CONFERENCE:         [] Prospective    [] Retrospective     [] Follow-Up          [] Eligible for clinical trial    TUMOR BOARD RECOMMENDATIONS/PLAN/CONSENSUS:      Case discussed among group. Pathology and radiologic images were reviewed (if applicable).    Recommend FDG PET scan now, then likely EBRT + ADT + abiraterone/prednisone + cabazitaxel  + carboplatin + SBRT to L spine, as above.

## 2024-11-06 DIAGNOSIS — C7A.8 NEUROENDOCRINE CARCINOMA: Primary | ICD-10-CM

## 2024-11-06 NOTE — PROGRESS NOTES
I have reviewed the notes, assessments, and/or procedures performed by Dr. Lassiter, I concur with her/his documentation of Theo Matthew Jr..  Date of Service: 11/5/2024

## 2024-11-06 NOTE — PROGRESS NOTES
I have reviewed the notes, assessments, and/or procedures performed by Dr. Lassiter, I concur with her/his documentation of Theo Matthew Jr..  Date of Service: 11/5/2024    Incidentally, called patient to review plan. He tells me he has elected to forgo treatment. I encouraged him to pursue treatment, as I worry uncontrolled cancer will cause signficant morbidity and mortality in the short term. I will keep appts on the books and schedule FDG PET. Patient will continue to consider his options.

## 2024-11-07 ENCOUNTER — TELEPHONE (OUTPATIENT)
Dept: HEMATOLOGY/ONCOLOGY | Facility: CLINIC | Age: 54
End: 2024-11-07
Payer: COMMERCIAL

## 2024-11-07 ENCOUNTER — DOCUMENTATION ONLY (OUTPATIENT)
Dept: RADIATION ONCOLOGY | Facility: CLINIC | Age: 54
End: 2024-11-07
Payer: COMMERCIAL

## 2024-11-07 NOTE — TELEPHONE ENCOUNTER
----- Message from Onelia sent at 11/7/2024 11:33 AM CST -----  Regarding: Consult/Advisory  Contact: Theo  Consult/Advisory     Name Of Caller:Theo Matthew Jr.          Contact Preference: Contact Information  [phone] (Mobile)            Nature of call: pt is requesting to speak with nurse in regards to medication an some upcoming appt. Please advise. Requesting a call back.

## 2024-11-07 NOTE — TELEPHONE ENCOUNTER
"I spoke to patient. He said he had spoken to Dr. Thorne yesterday about not having treatment. They had agreed to leave the appointments but Dr. Thorne said if he decided he didn't want treatment/appointments to let him know. That is what he is calling for, he wants to "back out" of treatment. I clarified with him if he wanted everything cancelled, appointments for labs, Dr. Thorne, scans, treatments, and he said yes, cancel them all. I told him I would let Dr. Thorne know and if he every needed anything or changed his mind, to give us a call. He verbalized understanding.   "

## 2024-11-08 ENCOUNTER — TELEPHONE (OUTPATIENT)
Dept: UROLOGY | Facility: CLINIC | Age: 54
End: 2024-11-08
Payer: COMMERCIAL

## 2024-11-08 NOTE — PROGRESS NOTES
I was notified by Dr. Thorne on 11/6/2024, that the patient would like to forego treatment.  The patient later reached out to request that we cancel any upcoming appointments    Thanks  Cornel Gomez MD/PhD  Radiation Oncology

## 2024-11-08 NOTE — TELEPHONE ENCOUNTER
Confirmed need for patient to keep appt for stent removal with Dr. Mckeon. Patient verbalized understanding and agreed.

## 2024-12-19 ENCOUNTER — TELEPHONE (OUTPATIENT)
Dept: UROLOGY | Facility: CLINIC | Age: 54
End: 2024-12-19
Payer: COMMERCIAL

## 2024-12-19 NOTE — TELEPHONE ENCOUNTER
Spoke with patient regarding 1/10 procedure with Dr. Mckeon. He was explaining he is under the impression the stent is coming out not that he is having one re-inserted. I explained that the plan is to remove the stent. Based on intra op findings if there is indication for reinsertion it is a possibility but nothing is for certain until they are looking at the anatomy day of. Patient agreed and verbalized understanding. Will call with pre op instructions on 1/9.    ----- Message from LENORA Ingram sent at 12/18/2024  1:03 PM CST -----  Regarding: FW: PT ADVICE  Contact: [phone]    ----- Message -----  From: Elvira Hilliard  Sent: 12/18/2024  12:43 PM CST  To: [facility] Staff  Subject: PT ADVICE                                        Pt is requesting call from someone in the office and is asking for a return call back soon. Thanks.     Reason for call:discuss plan of cre for upcoming surgery on 1/10     Patient's DX:     Patient requesting call back or [facility] msg: [phone]

## 2024-12-26 ENCOUNTER — PATIENT MESSAGE (OUTPATIENT)
Dept: HEMATOLOGY/ONCOLOGY | Facility: CLINIC | Age: 54
End: 2024-12-26
Payer: COMMERCIAL

## 2025-01-07 ENCOUNTER — PATIENT MESSAGE (OUTPATIENT)
Dept: UROLOGY | Facility: CLINIC | Age: 55
End: 2025-01-07
Payer: COMMERCIAL

## 2025-01-09 ENCOUNTER — TELEPHONE (OUTPATIENT)
Dept: UROLOGY | Facility: CLINIC | Age: 55
End: 2025-01-09
Payer: COMMERCIAL

## 2025-01-09 ENCOUNTER — PATIENT MESSAGE (OUTPATIENT)
Dept: UROLOGY | Facility: CLINIC | Age: 55
End: 2025-01-09
Payer: COMMERCIAL

## 2025-01-09 RX ORDER — ITRACONAZOLE 100 MG/1
200 CAPSULE ORAL DAILY
COMMUNITY

## 2025-01-09 RX ORDER — GABAPENTIN 300 MG/1
600 CAPSULE ORAL 2 TIMES DAILY
COMMUNITY
Start: 2025-01-07

## 2025-01-09 NOTE — PRE-PROCEDURE INSTRUCTIONS
PreOp Instructions given:   - Verbal medication information (what to hold and what to take)   - NPO guidelines 2300  - Arrival place directions given; time to be given the day before procedure by the   Surgeon's Office [id number] [facility]  - Bathing with antibacterial soap   - Don't wear any jewelry or bring any valuables AM of surgery   - No makeup or moisturizer to face   - No perfume/cologne, powder, lotions or aftershave   Pt. verbalized understanding.   Pt denies any h/o Anesthesia/Sedation complications or side effects.  RT CVC  Msg sent to Dr. Mckeon/Staff re: pt requesting a call back to discuss procedure.

## 2025-01-09 NOTE — TELEPHONE ENCOUNTER
Spoke with patient regarding surgery time and instructions. Patient informed of NPO status after midnight, location of surgery, arrival time 0500AM and that a  will need to be present post operatively due to patient receiving anesthesia. Patient verbalized understanding, agreed and confirmed.

## 2025-01-10 ENCOUNTER — HOSPITAL ENCOUNTER (OUTPATIENT)
Facility: HOSPITAL | Age: 55
Discharge: HOME | End: 2025-01-10
Attending: UROLOGY | Admitting: UROLOGY
Payer: COMMERCIAL

## 2025-01-10 ENCOUNTER — ANESTHESIA (OUTPATIENT)
Dept: SURGERY | Facility: HOSPITAL | Age: 55
End: 2025-01-10
Payer: COMMERCIAL

## 2025-01-10 ENCOUNTER — ANESTHESIA EVENT (OUTPATIENT)
Dept: SURGERY | Facility: HOSPITAL | Age: 55
End: 2025-01-10
Payer: COMMERCIAL

## 2025-01-10 VITALS
HEIGHT: 66 IN | TEMPERATURE: 98 F | RESPIRATION RATE: 15 BRPM | DIASTOLIC BLOOD PRESSURE: 68 MMHG | BODY MASS INDEX: 26.36 KG/M2 | OXYGEN SATURATION: 99 % | WEIGHT: 164 LBS | SYSTOLIC BLOOD PRESSURE: 110 MMHG | HEART RATE: 69 BPM

## 2025-01-10 DIAGNOSIS — R31.0 GROSS HEMATURIA: Primary | ICD-10-CM

## 2025-01-10 DIAGNOSIS — C61 PROSTATE CANCER: ICD-10-CM

## 2025-01-10 PROBLEM — N13.30 BILATERAL HYDRONEPHROSIS: Status: ACTIVE | Noted: 2025-01-10

## 2025-01-10 LAB — POCT GLUCOSE: 88 MG/DL (ref 70–110)

## 2025-01-10 PROCEDURE — 25000003 PHARM REV CODE 250: Performed by: REGISTERED NURSE

## 2025-01-10 PROCEDURE — 52332 CYSTOSCOPY AND TREATMENT: CPT | Mod: 50,,, | Performed by: UROLOGY

## 2025-01-10 PROCEDURE — 71000015 HC POSTOP RECOV 1ST HR: Performed by: UROLOGY

## 2025-01-10 PROCEDURE — 74420 UROGRAPHY RTRGR +-KUB: CPT | Mod: 26,,, | Performed by: UROLOGY

## 2025-01-10 PROCEDURE — 63600175 PHARM REV CODE 636 W HCPCS: Performed by: REGISTERED NURSE

## 2025-01-10 PROCEDURE — 71000044 HC DOSC ROUTINE RECOVERY FIRST HOUR: Performed by: UROLOGY

## 2025-01-10 PROCEDURE — 25500020 PHARM REV CODE 255: Performed by: UROLOGY

## 2025-01-10 PROCEDURE — 36000707: Performed by: UROLOGY

## 2025-01-10 PROCEDURE — C1769 GUIDE WIRE: HCPCS | Performed by: UROLOGY

## 2025-01-10 PROCEDURE — C1758 CATHETER, URETERAL: HCPCS | Performed by: UROLOGY

## 2025-01-10 PROCEDURE — 37000009 HC ANESTHESIA EA ADD 15 MINS: Performed by: UROLOGY

## 2025-01-10 PROCEDURE — C2617 STENT, NON-COR, TEM W/O DEL: HCPCS | Performed by: UROLOGY

## 2025-01-10 PROCEDURE — 36000706: Performed by: UROLOGY

## 2025-01-10 PROCEDURE — 37000008 HC ANESTHESIA 1ST 15 MINUTES: Performed by: UROLOGY

## 2025-01-10 DEVICE — STENT URETERAL UNIV 6FR 28CM: Type: IMPLANTABLE DEVICE | Site: URETER | Status: FUNCTIONAL

## 2025-01-10 RX ORDER — LACTASE 3000 UNIT
1 TABLET ORAL
COMMUNITY

## 2025-01-10 RX ORDER — SODIUM CHLORIDE 0.9 % (FLUSH) 0.9 %
3 SYRINGE (ML) INJECTION
Status: DISCONTINUED | OUTPATIENT
Start: 2025-01-10 | End: 2025-01-10 | Stop reason: HOSPADM

## 2025-01-10 RX ORDER — MULTIVITAMIN WITH IRON
TABLET ORAL DAILY
COMMUNITY

## 2025-01-10 RX ORDER — LIDOCAINE HYDROCHLORIDE 20 MG/ML
INJECTION INTRAVENOUS
Status: DISCONTINUED | OUTPATIENT
Start: 2025-01-10 | End: 2025-01-10

## 2025-01-10 RX ORDER — GLUCAGON 1 MG
1 KIT INJECTION
Status: DISCONTINUED | OUTPATIENT
Start: 2025-01-10 | End: 2025-01-10 | Stop reason: HOSPADM

## 2025-01-10 RX ORDER — MELATONIN 10 MG
60 CAPSULE ORAL
COMMUNITY

## 2025-01-10 RX ORDER — DEXMEDETOMIDINE HYDROCHLORIDE 100 UG/ML
INJECTION, SOLUTION INTRAVENOUS
Status: DISCONTINUED | OUTPATIENT
Start: 2025-01-10 | End: 2025-01-10

## 2025-01-10 RX ORDER — PROPOFOL 10 MG/ML
VIAL (ML) INTRAVENOUS
Status: DISCONTINUED | OUTPATIENT
Start: 2025-01-10 | End: 2025-01-10

## 2025-01-10 RX ORDER — ONDANSETRON HYDROCHLORIDE 2 MG/ML
INJECTION, SOLUTION INTRAVENOUS
Status: DISCONTINUED | OUTPATIENT
Start: 2025-01-10 | End: 2025-01-10

## 2025-01-10 RX ORDER — LIDOCAINE HYDROCHLORIDE 10 MG/ML
INJECTION, SOLUTION EPIDURAL; INFILTRATION; INTRACAUDAL; PERINEURAL
Status: DISCONTINUED
Start: 2025-01-10 | End: 2025-01-10 | Stop reason: HOSPADM

## 2025-01-10 RX ORDER — MIDAZOLAM HYDROCHLORIDE 1 MG/ML
INJECTION INTRAMUSCULAR; INTRAVENOUS
Status: DISCONTINUED | OUTPATIENT
Start: 2025-01-10 | End: 2025-01-10

## 2025-01-10 RX ORDER — CEFAZOLIN 2 G/1
2 INJECTION, POWDER, FOR SOLUTION INTRAMUSCULAR; INTRAVENOUS
Status: DISCONTINUED | OUTPATIENT
Start: 2025-01-10 | End: 2025-01-10 | Stop reason: HOSPADM

## 2025-01-10 RX ADMIN — LIDOCAINE HYDROCHLORIDE 100 MG: 20 INJECTION INTRAVENOUS at 08:01

## 2025-01-10 RX ADMIN — DEXMEDETOMIDINE 8 MCG: 100 INJECTION, SOLUTION, CONCENTRATE INTRAVENOUS at 08:01

## 2025-01-10 RX ADMIN — PROPOFOL 50 MG: 10 INJECTION, EMULSION INTRAVENOUS at 08:01

## 2025-01-10 RX ADMIN — MIDAZOLAM HYDROCHLORIDE 2 MG: 2 INJECTION, SOLUTION INTRAMUSCULAR; INTRAVENOUS at 08:01

## 2025-01-10 RX ADMIN — ONDANSETRON 4 MG: 2 INJECTION INTRAMUSCULAR; INTRAVENOUS at 08:01

## 2025-01-10 RX ADMIN — Medication 2 G: at 08:01

## 2025-01-10 RX ADMIN — SODIUM CHLORIDE, SODIUM LACTATE, POTASSIUM CHLORIDE, AND CALCIUM CHLORIDE: .6; .31; .03; .02 INJECTION, SOLUTION INTRAVENOUS at 08:01

## 2025-01-10 RX ADMIN — PROPOFOL 100 MCG/KG/MIN: 10 INJECTION, EMULSION INTRAVENOUS at 08:01

## 2025-01-10 NOTE — DISCHARGE INSTRUCTIONS
Post Cystoscopy Instructions  Do not strain to have a bowel movement  No strenuous exercise x 7 days  No driving while you are on narcotic pain medications or if your redman  catheter is in place    You can expect:  To pass stone fragments if you had a stone procedure  Have pain when you void from your stent if you have a stent in place  See blood in your urine if you have a stent in place    If you have a catheter, please return to the ER if your catheter stops draining or you are having abdominal pain.    Call the doctor if:  Temperature is greater than 101F  Persistent vomiting and inability to keep food down  Inability to void if you do not have a catheter

## 2025-01-10 NOTE — H&P
FOCUSED SURGICAL H&P    Theo Matthew Jr. is a 54 y.o. male. MRN is [id number].    CC: Here today for the following surgical procedure(s):  CYSTOURETEROSCOPY, WITH RETROGRADE PYELOGRAM AND URETERAL STENT INSERTION (Right)    HPI: For a detailed history of the patients history of present illness please refer to the last progress note. In brief, this is a 54 y.o. male with a known history of prostate, here today for surgical intervention. There has been no recent changes in the patients health, including fevers, chest pain, or shortness of breath, and no new medications have been started. The patient has not had anything to eat or drink for the last 8 hours.        Past Medical History:   Past Medical History:   Diagnosis Date    Internal hemorrhoid        Past Surgical History:   Past Surgical History:   Procedure Laterality Date    COLONOSCOPY  02/18/2020    COLONOSCOPY N/A 2/18/2020    Procedure: COLONOSCOPY/Suprep;  Surgeon: Agueda Jacques MD;  [facility]: [facility];  Service: Endoscopy;  Laterality: N/A;    IRRIGATION AND DEBRIDEMENT OF UPPER EXTREMITY Right 6/11/2024    Procedure: IRRIGATION AND DEBRIDEMENT, UPPER EXTREMITY;  Surgeon: Maryjo Chamberlain MD;  Location: [facility];  Service: Orthopedics;  Laterality: Right;    KNEE LIGAMENT RECONSTRUCTION Right     PROSTATE BIOPSY N/A 9/27/2024    Procedure: BIOPSY, PROSTATE/TRANSPERINEAL;  Surgeon: Isaiah Mckeon MD;  Location: [facility] OR [address] [place] [address];  Service: Urology;  Laterality: N/A;    REPAIR OF EXTENSOR TENDON Right 6/11/2024    Procedure: REPAIR, TENDON, EXTENSOR LONG FINGER;  Surgeon: Maryjo Chamberlain MD;  Location: [facility];  Service: Orthopedics;  Laterality: Right;    RETROGRADE PYELOGRAPHY Bilateral 9/27/2024    Procedure: PYELOGRAM, RETROGRADE;  Surgeon: Isaiah Mckeon MD;  [facility];  Service: Urology;  Laterality: Bilateral;    TENOLYSIS, EXTENSOR, HAND OR FINGER Right 6/11/2024    Procedure: TENOLYSIS, EXTENSOR, HAND OR FINGER;  Surgeon: Bulmaro  Maryjo LUNDBERG MD;  Location: [facility];  Service: Orthopedics;  Laterality: Right;    TENOSYNOVECTOMY Right 6/11/2024    Procedure: TENOSYNOVECTOMY, EXTENSOR TENDON LONG FINGER;  Surgeon: Maryjo Chamberlain MD;  Location: [facility] OR;  Service: Orthopedics;  Laterality: Right;    TRANSRECTAL ULTRASOUND EXAMINATION N/A 9/27/2024    Procedure: ULTRASOUND, RECTAL APPROACH;  Surgeon: Isaiah Mckeon MD;  Location: [facility] OR [address] [place] [address];  Service: Urology;  Laterality: N/A;    TRANSURETHRAL RESECTION OF PROSTATE N/A 9/27/2024    Procedure: TURP (TRANSURETHRAL RESECTION OF PROSTATE);  Surgeon: Isaiah Mckeon MD;  [facility];  Service: Urology;  Laterality: N/A;    URETERAL STENT PLACEMENT Bilateral 9/27/2024    Procedure: INSERTION, STENT, URETER;  Surgeon: Isaiah Mckeon MD;  [facility];  Service: Urology;  Laterality: Bilateral;       Social History:   Social History     Socioeconomic History    Marital status:    Occupational History    Occupation: [employer]   Tobacco Use    Smoking status: Never    Smokeless tobacco: Never   Substance and Sexual Activity    Alcohol use: No    Drug use: No    Sexual activity: Yes     Partners: Female     Birth control/protection: None   Social History Narrative    Previously worked for [employer] at [employer]. Lives in [place], [place] outside of [place]. Lives with his wife, Kenia, and his 2 daughters. Also with son.      Social Drivers of Health     Financial Resource Strain: Patient Declined (6/12/2024)    Overall Financial Resource Strain (CARDIA)     Difficulty of Paying Living Expenses: Patient declined   Food Insecurity: Patient Declined (6/12/2024)    Hunger Vital Sign     Worried About Running Out of Food in the Last Year: Patient declined     Ran Out of Food in the Last Year: Patient declined   Transportation Needs: Unknown (12/6/2021)    PRAPARE - Transportation     Lack of Transportation (Medical): Patient declined     Lack of Transportation (Non-Medical): Patient  declined   Physical Activity: Unknown (6/12/2024)    Exercise Vital Sign     Days of Exercise per Week: Patient declined   Stress: Patient Declined (6/12/2024)    [employer] - Occupational Stress Questionnaire     Feeling of Stress : Patient declined   Housing Stability: Unknown (6/12/2024)    Housing Stability Vital Sign     Unable to Pay for Housing in the Last Year: Patient declined       Family History:   Family History   Problem Relation Name Age of Onset    Hypertension Mother Mom     Seizures Father Theo Matthew Sr     Alcohol abuse Father Theo Matthew Sr     Prostate cancer Father Theo Matthew Sr     No Known Problems Sister      Pancreatic cancer Maternal Grandmother Chen Hernandez         passed 83 pancreate cancer    Diabetes Maternal Grandfather Lory Reeves     Prostate cancer Maternal Grandfather Lory Reeves     Arthritis Paternal Grandmother Lillian Brown     No Known Problems Paternal Grandfather      Breast cancer Neg Hx      Ovarian cancer Neg Hx            Allergies:  Review of patient's allergies indicates:  No Known Allergies      Medications:    Current Facility-Administered Medications:     ceFAZolin 2 g, 2 g, Intravenous, On Call Procedure, Terrance Rangel MD    LIDOcaine (PF) 10 mg/ml (1%) 10 mg/mL (1 %) injection, , , ,                   Vital Signs:  Vitals:    01/10/25 0613   BP: (!) 140/80   Pulse: 75   Resp: 20   Temp: 99.3 °F (37.4 °C)         Physical Exam:  Neuro: awake, alert, no acute distress.  HEENT: PERRLA, neck supple, no lymphadenopathy.  Heart: regular rate/rhythm  Lungs: equal chest expansion bilaterally, no increased work of breathing on RA  Abdomen: soft, non-distended, non-tender to palpation.  Extremities: warm, well-perfused       Labs:  Lab Results   Component Value Date/Time    WBC 5.61 11/04/2024 01:15 PM    HGB 13.1 (L) 11/04/2024 01:15 PM    HCT 39.9 (L) 11/04/2024 01:15 PM     11/04/2024 01:15 PM    MCV 89 11/04/2024 01:15 PM  "    Lab Results   Component Value Date/Time     11/04/2024 01:15 PM    K 3.7 11/04/2024 01:15 PM     11/04/2024 01:15 PM    CO2 26 11/04/2024 01:15 PM    BUN 10 11/04/2024 01:15 PM    GLU 91 11/04/2024 01:15 PM    MG 2.1 06/05/2024 04:31 AM     No results found for: "INR", "PT", "PTT"  No components found for: "TROPI"  Lab Results   Component Value Date/Time    ALT 17 11/04/2024 01:15 PM    AST 18 11/04/2024 01:15 PM          Assessment/Plan:  54 y.o. male here today for the following surgical procedure:    CYSTOURETEROSCOPY, WITH RETROGRADE PYELOGRAM AND URETERAL STENT INSERTION (Right)       The indications for surgery, highlighting the risks and benefits of the procedure were discussed with the patient. These included but are not limited to swelling, bleeding, pain, infection, and adverse anesthesia-related event. I also discussed risk of injury to nearby structures. The patient seems to understand the risks, as well as the alternatives including nonoperative observation/survellience and wishes to proceed with the surgical intervention.    Patient has been examined and consented.      Plan discussed with and agreed by Dr. Linda Rangel, DO  PGY-1    "

## 2025-01-10 NOTE — BRIEF OP NOTE
[facility] - [facility] (1st Fl)  Brief Operative Note    Surgery Date: 1/10/2025     Surgeons and Role:     * Isaiah Mckeon MD - Primary     * Giovanni Atwood MD - Resident - Assisting     * Terrance Rangel MD - Resident - Assisting        Pre-op Diagnosis:  Prostate cancer [C61]    Post-op Diagnosis:  Post-Op Diagnosis Codes:     * Prostate cancer [C61]    Procedure(s) (LRB):  CYSTOSCOPY, WITH URETERAL STENT INSERTION (Bilateral)  PYELOGRAM, RETROGRADE (Bilateral)  REMOVAL-STENT (Bilateral)    Anesthesia: General/MAC    Operative Findings:   Uncomplicated bilateral stent exchange    Estimated Blood Loss: * No values recorded between 1/10/2025  8:39 AM and 1/10/2025  9:14 AM *         Specimens:   Specimen (24h ago, onward)      None              Discharge Note    OUTCOME: Patient tolerated treatment/procedure well without complication and is now ready for discharge.    DISPOSITION: Home or Self Care    FINAL DIAGNOSIS:  Bilateral hydronephrosis    FOLLOWUP: In clinic    DISCHARGE INSTRUCTIONS:  No discharge procedures on file.

## 2025-01-10 NOTE — TRANSFER OF CARE
"Anesthesia Transfer of Care Note    Patient: Theo Matthew Jr.    Procedure(s) Performed: Procedure(s) (LRB):  CYSTOSCOPY, WITH URETERAL STENT INSERTION (Bilateral)  PYELOGRAM, RETROGRADE (Bilateral)  REMOVAL-STENT (Bilateral)    Patient location: PACU    Anesthesia Type: general    Transport from OR: Transported from OR on 6-10 L/min O2 by face mask with adequate spontaneous ventilation    Post pain: adequate analgesia    Post assessment: no apparent anesthetic complications and tolerated procedure well    Post vital signs: stable    Level of consciousness: sedated    Nausea/Vomiting: no nausea/vomiting    Complications: none    Transfer of care protocol was followedComments: Nurse at bedside, VSS, spont reg resp noted      Last vitals: Visit Vitals  BP 99/63   Pulse 73   Temp 37.4 °C (99.3 °F) (Temporal)   Resp 17   Ht 5' 6" (1.676 m)   Wt 74.4 kg (164 lb)   SpO2 100%   BMI 26.47 kg/m²     "

## 2025-01-10 NOTE — ANESTHESIA POSTPROCEDURE EVALUATION
Anesthesia Post Evaluation    Patient: Theo Matthew     Procedure(s) Performed: Procedure(s) (LRB):  CYSTOSCOPY, WITH URETERAL STENT INSERTION (Bilateral)  PYELOGRAM, RETROGRADE (Bilateral)  REMOVAL-STENT (Bilateral)    Final Anesthesia Type: general      Patient location during evaluation: PACU  Patient participation: Yes- Able to Participate  Level of consciousness: awake and alert  Post-procedure vital signs: reviewed and stable  Pain management: adequate  Airway patency: patent    PONV status at discharge: No PONV  Anesthetic complications: no      Cardiovascular status: blood pressure returned to baseline  Respiratory status: unassisted  Hydration status: euvolemic  Follow-up not needed.              Vitals Value Taken Time   /68 01/10/25 1002   Temp 36.6 °C (97.9 °F) 01/10/25 0920   Pulse 67 01/10/25 1005   Resp 15 01/10/25 1005   SpO2 99 % 01/10/25 1005         No case tracking events are documented in the log.      Pain/Davin Score: Davin Score: 10 (1/10/2025  9:50 AM)

## 2025-01-10 NOTE — PROGRESS NOTES
Discharge instructions given and explained to patient and family with verbalized understanding. VSS. Denies N/V, tolerating PO fluids. Rates pain level as tolerable. IV removed. All belongings obtained,Pt left floor in W/C, stable for transport, responsible person available for transport home.

## 2025-01-10 NOTE — PROGRESS NOTES
Pt voided at this time 100 cc's of dark yellow urine. No complaints of discomfort. Out of phase 2 and back to baseline.

## 2025-01-10 NOTE — OP NOTE
[facility]  Operative Note    Date: 01/10/2025    Pre-Op Diagnosis: Bilateral hydronephrosis    Post-Op Diagnosis: same    Procedure(s) Performed:   1.  Cystoscopy with Bilateral JJ ureteral stent exchange  2.  Bilateral retrograde pyelogram  Fluoro < 1 h    Specimen(s):   none    Staff Surgeon: Isaiah Mckeon MD    Assistant Surgeon: Giovanni Atwood MD; Terrance Rangel DO    Anesthesia: Monitored Local Anesthesia with Sedation    Indications: Theo Matthew Jr. is a 54 y.o. male with Bilateral hydronephrosis, currently with a 6 x 28 Bilateral double J ureteral stent currently in place x 4 months.  He presents for stent exchange today.      Findings:   Normal cystourethroscopy  Bilateral stents in good position on  film, entangled on cystoscopy  Right retrograde pyelogram demonstrated ureter normal in course and caliber with no hydronephrosis  Left retrograde pyelogram demonstrated moderate hydroureteronephrosis with distal tortuosity and J-hooking of the left ureter  Uncomplicated bilateral ureteral stent exchange    Estimated Blood Loss: min    Drains:   1.  6 x 28 Bilateral Double J ureteral stent without strings    Procedure in Detail:  After risks, benefits and possible complications of the procedure were explained, the patient elected to undergo the procedure and informed consent was obtained.  All questions were answered in the franki-operative area. The patient was transferred to the cystoscopy suite and placed on the fluoroscopy table in the supine position.  SCDs were applied and working. Time out was performed, franki-procedural antibiotics were given. Anesthesia was administered.  After adequate anesthesia the patient was placed in dorsal lithotomy position and prepped and draped in the usual sterile fashion.     A rigid cystoscope in a 22 Fr sheath was introduced into the patients bladder per urethra.  This passed easily.  The entire urethra was visualized and revealed no strictures or  masses.  Formal cystoscopy was performed which showed the right and left ureteral orifices in the normal anatomic position effluxing clear urine.  There were no masses or lesions seen, no bladder stones, no diverticuli and no trabeculations.      Our attention was turned to the patients right ureteral orifice which had a stent in place.  An alligator grasper was used to grasp the right ureteral stent and bring it to the urethral meatus.  A motion wire was passed up the stent into the kidney under fluoroscopy.  There was initial difficulty uncoiling the proximal coil with a motion wire.  A motion wire was removed and a stiff wire was used.  This passed easily.  The scope was then reinserted over the wire.  A 5 Cypriot ureteral catheter was inserted and a retrograde pyelogram was performed which demonstrated ureter normal in course and caliber with no filling defects and no hydronephrosis.  A 6 Cypriot x 28 cm ureteral stent was then passed over the wire up into the kidney under fluoroscopy.  The wire was removed and good coils were noted in the kidney and bladder on fluoroscopy and direct vision.    We then turned our attention to the patient's left ureteral orifice.  An alligator grasper was used to bring the stent to the urethral meatus.  A wire was attempted to be passed through the stent however again there was difficulty on coiling the proximal coil.  The wire was removed and the scope was inserted alongside the stent.  A motion wire was then passed alongside the stent up to the left renal pelvis under fluoroscopy.  There was J hooking of the left ureter.  Otherwise the wire passed easily.  A 5 Cypriot ureteral catheter was then passed over the wire through the scope and a retrograde pyelogram was performed which demonstrated tortuosity of the left distal ureter with moderate hydroureteronephrosis.  The wire was replaced and the 5 Cypriot ureteral catheter was removed.  6 Cypriot x 28 cm ureteral stent without  strings was then passed over the wire up into the kidney.  The wire was removed and good coils were noted in the kidney and bladder on fluoroscopy and direct vision.  The bladder was drained and the scope removed.      Follow up care: The patient will follow up with Dr. Mckeon for his next ureteral stent exchange.    Giovanni Atwood MD

## 2025-01-10 NOTE — ANESTHESIA PREPROCEDURE EVALUATION
[facility]  Anesthesia Pre-Operative Evaluation     Patient Name: Theo Matthew Jr.  YOB: 1970  MRN: [id number]  [facility]: [id number]       Admit Date: 1/10/2025   Admit Team: Networked reference to record PCT   Hospital Day: 1  Date of Procedure: 1/10/2025  Anesthesia: General Procedure: Procedure(s) (LRB):  CYSTOURETEROSCOPY, WITH RETROGRADE PYELOGRAM AND URETERAL STENT INSERTION (Right)  Pre-Operative Diagnosis: Prostate cancer [C61]  Proceduralist:Surgeons and Role:     * Isaiah Mckeon MD - Primary  Code Status: Prior   Advanced Directive: <no information>  Isolation Precautions: No active isolations  Capacity: Full capacity     SUBJECTIVE:   Theo Matthew Jr. is a 54 y.o. male who  has a past medical history of Internal hemorrhoid.  No notes on file    Hospital LOS: 0 days  ICU LOS: Patient does not have an ICU stay during this admission.    he has a current medication list which includes the following long-term medication(s): gabapentin, bicalutamide, and oxybutynin.   Current Outpatient Medications   Medication Instructions    abiraterone (ZYTIGA) 1,000 mg, Oral, Daily, Take as directed on an empty stomach.    bicalutamide (CASODEX) 50 mg, Oral, Daily    cholecalciferol (vitamin D3) (VITAMIN D3) 1,000 Units, Oral, Daily    FENBENDAZOLE, BULK, MISC 222 mg, Daily    gabapentin (NEURONTIN) 600 mg, 2 times daily    INV farnaz-009 (nab-rapamycin) (INV FARNAZ-009 (NAB-RAPAMYCIN)) 5 mg, Intravenous, Every 7 days, Takes on Sunday // PO Tablet    INV ivermectin 12 mg, Oral, Daily    itraconazole (SPORANOX) 200 mg, Daily    naltrexone HCl (NALTREXONE ORAL) 3 mg, Daily    oxybutynin (DITROPAN) 5 mg, Oral, 3 times daily PRN    predniSONE (DELTASONE) 5 mg, Oral, Daily, Take as directed with water on an empty stomach.     ALLERGIES:   Review of patient's allergies indicates:  No Known Allergies  LDA:   AIRWAY:         [unfilled]     Lines/Drains/Airways       Central Venous Catheter Line   Duration                  PowerPort A Cath Single Lumen 11/29/24 0000 Subclavian Right 42 days                   Anesthesia Evaluation      Airway   Mallampati: II  TM distance: Normal  Neck ROM: Normal ROM  Dental    (+) Intact    Pulmonary    Cardiovascular   Exercise tolerance: good    Neuro/Psych      GI/Hepatic/Renal      Endo/Other    (+) arthritis  Abdominal                MEDICATIONS:     Current Outpatient Medications on File Prior to Encounter   Medication Sig Dispense Refill Last Dose/Taking    FENBENDAZOLE, BULK, MISC 222 mg by Misc.(Non-Drug; Combo Route) route Daily.   1/9/2025    gabapentin (NEURONTIN) 300 MG capsule Take 600 mg by mouth 2 (two) times daily. Pre procedure w/Hypothermia treatment   1/7/2025    itraconazole (SPORANOX) 100 mg Cap Take 200 mg by mouth once daily.   1/9/2025    naltrexone HCl (NALTREXONE ORAL) Take 3 mg by mouth once daily.   1/9/2025    INV farnaz-009, nab-rapamycin, (INV FARNAZ-009, NAB-RAPAMYCIN,) 5 mg/mL injection Inject 5 mg into the vein every 7 days. Takes on Sunday // PO Tablet       INV ivermectin tablet Take 12 mg by mouth once daily.   1/5/2025      Inpatient Medications:  Antibiotics (From admission, onward)      Start     Stop Route Frequency Ordered    01/10/25 0552  ceFAZolin 2 g         -- IV On Call Procedure 01/10/25 0552          VTE Risk Mitigation (From admission, onward)           Ordered     IP VTE HIGH RISK PATIENT  Once         01/10/25 0552     Place sequential compression device  Until discontinued         01/10/25 0552                   LIDOcaine (PF) 10 mg/ml (1%)           Current Facility-Administered Medications   Medication Dose Route Frequency Provider Last Rate Last Admin    ceFAZolin 2 g  2 g Intravenous On Call Procedure Terrance Rangel MD        LIDOcaine (PF) 10 mg/ml (1%) 10 mg/mL (1 %) injection                   History:   There are no hospital problems to display for this patient.    Surgical History:    has a past surgical  "history that includes Knee ligament reconstruction (Right); Colonoscopy (02/18/2020); Colonoscopy (N/A, 2/18/2020); Tenosynovectomy (Right, 6/11/2024); Irrigation and debridement of upper extremity (Right, 6/11/2024); Repair of extensor tendon (Right, 6/11/2024); tenolysis, extensor, hand or finger (Right, 6/11/2024); Prostate biopsy (N/A, 9/27/2024); Ureteral stent placement (Bilateral, 9/27/2024); Retrograde pyelography (Bilateral, 9/27/2024); Transrectal ultrasound examination (N/A, 9/27/2024); and Transurethral resection of prostate (N/A, 9/27/2024).   Social History:    reports being sexually active and has had partner(s) who are female. He reports using the following method of birth control/protection: None.  reports that he has never smoked. He has never used smokeless tobacco. He reports that he does not drink alcohol and does not use drugs.    Vitals:    01/10/25 0613   BP: (!) 140/80   BP Location: Left arm   Patient Position: Lying   Pulse: 75   Resp: 20   Temp: 37.4 °C (99.3 °F)   TempSrc: Temporal   SpO2: 99%   Weight: 74.4 kg (164 lb)   Height: 5' 6" (1.676 m)     Vital Signs Range (Last 24H):  Temp:  [37.4 °C (99.3 °F)]   Pulse:  [75]   Resp:  [20]   BP: (140)/(80)   SpO2:  [99 %]     Body mass index is 26.47 kg/m².  Wt Readings from Last 4 Encounters:   01/10/25 74.4 kg (164 lb)   11/04/24 81.5 kg (179 lb 10.8 oz)   11/04/24 81.5 kg (179 lb 12.6 oz)   10/16/24 80.2 kg (176 lb 12.9 oz)        Intake/Output - Last 3 Shifts       None          Lab Results   Component Value Date    WBC 5.61 11/04/2024    HGB 13.1 (L) 11/04/2024    HCT 39.9 (L) 11/04/2024     11/04/2024     11/04/2024    K 3.7 11/04/2024     11/04/2024    CREATININE 1.0 11/04/2024    BUN 10 11/04/2024    CO2 26 11/04/2024    GLU 91 11/04/2024    CALCIUM 9.7 11/04/2024    MG 2.1 06/05/2024    ALKPHOS 69 11/04/2024    ALT 17 11/04/2024    AST 18 11/04/2024    ALBUMIN 4.1 11/04/2024    HGBA1C 5.8 (H) 06/04/2024     Recent " "Results (from the past 12 hours)   POCT glucose    Collection Time: 01/10/25  6:12 AM   Result Value Ref Range    POCT Glucose 88 70 - 110 mg/dL     No results for input(s): "WBC", "HGB", "HCT", "PLT", "NA", "K", "CREATININE", "GLU", "INR", "LACTATE", "5HIAAPLASMA", "5HIAAURINT", "5HIAA", "8VSSM37CP" in the last 168 hours.  No LMP for male patient.    EKG:   No results found for this or any previous visit.  TTE:  No results found for this or any previous visit.    GO:  No results found for this or any previous visit.    Stress Test:  No results found for this or any previous visit.    No results found for this or any previous visit.    LHC:  No results found for this or any previous visit.    Cardiac Device Check   No results found for this or any previous visit.    No results found for this or any previous visit.                                                                                                               01/10/2025  Theo Matthew Jr. is a 54 y.o., male.      Pre-op Assessment    I have reviewed the Patient Summary Reports.       I have reviewed the Medications.     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                    Cardiovascular:  Cardiovascular Normal Exercise tolerance: good                                             Musculoskeletal:  Arthritis                   Physical Exam  General: Well nourished, Cooperative, Alert and Oriented    Airway:  Mallampati: II   Mouth Opening: Normal  TM Distance: Normal  Tongue: Normal  Neck ROM: Normal ROM    Dental:  Intact    Anesthesia Plan  Type of Anesthesia, risks & benefits discussed:    Anesthesia Type: Gen ETT, Gen Supraglottic Airway  Intra-op Monitoring Plan: Standard ASA Monitors  Post " Op Pain Control Plan: multimodal analgesia and IV/PO Opioids PRN  Induction:  IV  Airway Plan: Video, Post-Induction  Informed Consent: Informed consent signed with the Patient and all parties understand the risks and agree with anesthesia plan.  All questions answered.   ASA Score: 3    Ready For Surgery From Anesthesia Perspective.   .

## 2025-02-04 ENCOUNTER — PATIENT MESSAGE (OUTPATIENT)
Dept: PRIMARY CARE CLINIC | Facility: CLINIC | Age: 55
End: 2025-02-04
Payer: COMMERCIAL

## 2025-02-05 ENCOUNTER — TELEPHONE (OUTPATIENT)
Dept: PRIMARY CARE CLINIC | Facility: CLINIC | Age: 55
End: 2025-02-05

## 2025-02-05 DIAGNOSIS — E66.3 OVERWEIGHT: ICD-10-CM

## 2025-02-05 DIAGNOSIS — Z00.00 ANNUAL PHYSICAL EXAM: Primary | ICD-10-CM

## 2025-02-05 NOTE — TELEPHONE ENCOUNTER
Received evisit request for ivermectin rx for prostate cancer, needs to contact his urologist for this    Is due for annual visit with me, please schedule with labs prior

## 2025-02-24 ENCOUNTER — OFFICE VISIT (OUTPATIENT)
Dept: UROLOGY | Facility: CLINIC | Age: 55
End: 2025-02-24
Payer: COMMERCIAL

## 2025-02-24 DIAGNOSIS — N13.30 HYDRONEPHROSIS, UNSPECIFIED HYDRONEPHROSIS TYPE: Primary | ICD-10-CM

## 2025-02-24 DIAGNOSIS — C61 PROSTATE CANCER: Primary | ICD-10-CM

## 2025-02-24 PROCEDURE — 98006 SYNCH AUDIO-VIDEO EST MOD 30: CPT | Mod: 95,,, | Performed by: UROLOGY

## 2025-02-24 PROCEDURE — G2211 COMPLEX E/M VISIT ADD ON: HCPCS | Mod: 95,,, | Performed by: UROLOGY

## 2025-02-24 NOTE — PROGRESS NOTES
[facility]  Urologic Oncology    The patient location is: [facility]  The chief complaint leading to consultation is:  Prostate cancer, hydronephrosis    Visit type: audiovisual    Face to Face time with patient: 15  30 minutes of total time spent on the encounter, which includes face to face time and non-face to face time preparing to see the patient (eg, review of tests), Obtaining and/or reviewing separately obtained history, Documenting clinical information in the electronic or other health record, Independently interpreting results (not separately reported) and communicating results to the patient/family/caregiver, or Care coordination (not separately reported).     Each patient to whom he or she provides medical services by telemedicine is:  (1) informed of the relationship between the physician and patient and the respective role of any other health care provider with respect to management of the patient; and (2) notified that he or she may decline to receive medical services by telemedicine and may withdraw from such care at any time.    Date of Service: 02/24/2025    Urologic Oncology Problem List:  Very high-risk prostate cancer, PSA 40 at the time of diagnosis  Grade group 5 prostate cancer diagnosed on Transurethral resection for trigone invasion  PSMA PET scan showed uptake throughout the tracer, pelvic lymph nodes, and an L3 sclerotic lesion concerning for metastatic disease  Currently under the care of a naturopath physician in [place], patient has received our recommendations urine standard of care treatment and is not pursuing these  Bilateral hydronephrosis status post ureteral stent placement at the time of Transurethral resection on 09/27/2024    History of Present Illness:   History of Present Illness    CHIEF COMPLAINT:  Mr. Matthew presents today for follow up of ureteral stents and prostate cancer.    PROSTATE CANCER TREATMENT:  He is currently receiving insulin potentiated  therapy (IPT) with low-dose chemotherapy and hypothermia treatment. He continues ivermectin as part of his treatment regimen.       Imaging: I have reviewed the imaging study PSMA PET scan on 10/25/2024 personally, have independently interpreted this, and agree with the findings    Allergies:  Review of patient's allergies indicates:  No Known Allergies     Medications per EMR:  Prescriptions Prior to Admission[1]    Past Medical History:  Past Medical History:   Diagnosis Date    Internal hemorrhoid         Past Surgical History:  Past Surgical History:   Procedure Laterality Date    COLONOSCOPY  02/18/2020    COLONOSCOPY N/A 2/18/2020    Procedure: COLONOSCOPY/Suprep;  Surgeon: Agueda Jacques MD;  Location: [facility];  Service: Endoscopy;  Laterality: N/A;    CYSTOSCOPY W/ URETERAL STENT PLACEMENT Bilateral 1/10/2025    Procedure: CYSTOSCOPY, WITH URETERAL STENT INSERTION;  Surgeon: Isaiah Mckeon MD;  Location: [facility] OR [address] [place] [address];  Service: Urology;  Laterality: Bilateral;    IRRIGATION AND DEBRIDEMENT OF UPPER EXTREMITY Right 6/11/2024    Procedure: IRRIGATION AND DEBRIDEMENT, UPPER EXTREMITY;  Surgeon: Mrayjo Chamberlain MD;  Location: [facility];  Service: Orthopedics;  Laterality: Right;    KNEE LIGAMENT RECONSTRUCTION Right     PROSTATE BIOPSY N/A 9/27/2024    Procedure: BIOPSY, PROSTATE/TRANSPERINEAL;  Surgeon: Isaiah Mckeon MD;  Location: [facility] OR [facility] [facility];  Service: Urology;  Laterality: N/A;    REMOVAL-STENT Bilateral 1/10/2025    Procedure: REMOVAL-STENT;  Surgeon: Isaiah Mckeon MD;  Location: [address];  Service: Urology;  Laterality: Bilateral;    REPAIR OF EXTENSOR TENDON Right 6/11/2024    Procedure: REPAIR, TENDON, EXTENSOR LONG FINGER;  Surgeon: Maryjo Chamberlain MD;  Location: [facility];  Service: Orthopedics;  Laterality: Right;    RETROGRADE PYELOGRAPHY Bilateral 9/27/2024    Procedure: PYELOGRAM, RETROGRADE;  Surgeon: Isaiah Mckeon MD;  Location: [address];  Service: Urology;  Laterality:  Bilateral;    RETROGRADE PYELOGRAPHY Bilateral 1/10/2025    Procedure: PYELOGRAM, RETROGRADE;  Surgeon: Isaiah Mckeon MD;  [facility];  Service: Urology;  Laterality: Bilateral;    TENOLYSIS, EXTENSOR, HAND OR FINGER Right 6/11/2024    Procedure: TENOLYSIS, EXTENSOR, HAND OR FINGER;  Surgeon: Maryjo Chamberlain MD;  Location: [facility] OR;  Service: Orthopedics;  Laterality: Right;    TENOSYNOVECTOMY Right 6/11/2024    Procedure: TENOSYNOVECTOMY, EXTENSOR TENDON LONG FINGER;  Surgeon: Maryjo Chamberlain MD;  Location: [facility] OR;  Service: Orthopedics;  Laterality: Right;    TRANSRECTAL ULTRASOUND EXAMINATION N/A 9/27/2024    Procedure: ULTRASOUND, RECTAL APPROACH;  Surgeon: Isaiah Mckeon MD;  Location: [facility] OR [address] [place] [address];  Service: Urology;  Laterality: N/A;    TRANSURETHRAL RESECTION OF PROSTATE N/A 9/27/2024    Procedure: TURP (TRANSURETHRAL RESECTION OF PROSTATE);  Surgeon: Isaiah Mckeon MD;  Location: [facility] OR [address] [place] [address];  Service: Urology;  Laterality: N/A;    URETERAL STENT PLACEMENT Bilateral 9/27/2024    Procedure: INSERTION, STENT, URETER;  Surgeon: Isaiah Mckeon MD;  Location: [facility] OR [address] [place] [address];  Service: Urology;  Laterality: Bilateral;        Family History:  Family History   Problem Relation Name Age of Onset    Hypertension Mother Mom     Seizures Father Theo Matthew Sr     Alcohol abuse Father Theo Matthew Sr     Prostate cancer Father Theo Matthew Sr     No Known Problems Sister      Pancreatic cancer Maternal Grandmother Chen Hernandez         passed 83 pancreate cancer    Diabetes Maternal Grandfather Lory Reeves     Prostate cancer Maternal Grandfather Lory Reeves     Arthritis Paternal Grandmother Lillian Brown     No Known Problems Paternal Grandfather      Breast cancer Neg Hx      Ovarian cancer Neg Hx          Social History:  Social History     Tobacco Use    Smoking status: Never    Smokeless tobacco: Never   Substance Use Topics    Alcohol use: No          OBJECTIVE:     There were no vitals  filed for this visit.     Physical Exam        Physical Exam    General: No acute distress. Nontoxic appearing.  HENT: Normocephalic. Atraumatic.  Respiratory: Normal respiratory effort. No conversational dyspnea. No audible wheezing.  Abdomen: No obvious distension.  Skin: No visible abnormalities.  Extremities: No edema upper extremities. No edema lower extremities.  Neurological: Alert and oriented x3. Normal speech.  Psychiatric: Normal mood. Normal affect. No evidence of SI.         LABS:    CBC:  Lab Results   Component Value Date    WBC 5.61 11/04/2024    HGB 13.1 (L) 11/04/2024    HCT 39.9 (L) 11/04/2024    MCV 89 11/04/2024     11/04/2024         BMP:  Lab Results   Component Value Date     11/04/2024    K 3.7 11/04/2024     11/04/2024    CO2 26 11/04/2024    BUN 10 11/04/2024    CREATININE 1.0 11/04/2024    CALCIUM 9.7 11/04/2024    ANIONGAP 10 11/04/2024    EGFRNORACEVR >60.0 11/04/2024         ASSESSMENT/PLAN:     Assessment & Plan            Prostate cancer:  Chronic, stable   -had a thorough discussion with the patient regarding the current treatment and he is receiving in the standard of care and that these 2 do not allowing, he understands my recommendation for him to discontinue the naturopath treatment and to begin standard of care to prolonged both his quality of life and his quantity of life  -I offered a PSMA PET scan and a PSA today to monitor this disease burden, he is agreeable to this and understood that he is not receiving standard of care treatment    Bilateral hydronephrosis: Chronic, stable   -we will plan for a stent exchange 6 months after his last we will aim for July 20, 2025, urines beforehand  -discussed the risks of anesthesia and stent exchange with the patient he was in agreement to proceed    I spent a total of 30 minutes on the day of the visit.This includes face to face time and non-face to face time preparing to see the patient (eg, review of tests),  obtaining and/or reviewing separately obtained history, documenting clinical information in the electronic or other health record, independently interpreting results and communicating results to the patient/family/caregiver, or care coordinator    - code applied: patient requires or will require a continuous, longitudinal, and active collaborative plan of care related to this patient's health condition, prostate cancer, hydronephrosis --the management of which requires the direction of a practitioner with specialized clinical knowledge, skill, and expertise.     This encounter was dictated and transcribed using DeepScribe and FluencyDirect, please excuse any typographical or grammatical errors.           [1] (Not in a hospital admission)

## 2025-02-27 ENCOUNTER — PATIENT MESSAGE (OUTPATIENT)
Dept: HEMATOLOGY/ONCOLOGY | Facility: CLINIC | Age: 55
End: 2025-02-27
Payer: COMMERCIAL

## 2025-03-03 NOTE — PROGRESS NOTES
Physical Therapy Treatment    Patient Name: Cornelio Hines  MRN: [id number]  Today's Date: 3/3/2025    Current Problem  Problem List Items Addressed This Visit             ICD-10-CM    Sprain of anterior cruciate ligament of left knee S83.512A    Osteochondral defect of femoral condyle - Primary M95.8    New tear of anterior cruciate ligament, left, subsequent encounter S83.512D     Other Visit Diagnoses         Codes    Other specified acquired deformities of musculoskeletal system     M95.8            Insurance:  Payor: [facility] / Plan: [employer]ICAL [employer] / Product Type: *No Product type* /   Number of Treatments Authorized: 12 of [facility] (10 visits completed in POC in 2024)          Subjective   General  Reason for Referral: L OCD Repair and lateral menisectomy (DOS: 11/13/2024)  Referred By: Dr. Sonia Britt  Past Medical History Relevant to Rehab: R ACL Reconstruction 3/2022 - returned  General Comment: Patient denies any pain in her knee today. Notes no soreness after last session either.    Performing HEP?: Yes    Precautions  Precautions  Precautions Comment: None  Pain  Pain Assessment: 0-10  0-10 (Numeric) Pain Score: 0 - No pain  Pain Location: Knee  Pain Orientation: Left    Objective     General Observation  General Observation: Pronation collapse on L    Treatments:    Therapeutic Exercise  Therapeutic Exercise Activity 1: Sportsarc lvl 5 x 5 min  Therapeutic Exercise Activity 2: Dynamics: High knees, Butt kicks, open/close, toe swipes, x 40 feet each              Therapeutic Activity  Therapeutic Activity 1: 1a Back Squat with 1 pulse 95 lbs,3 x8, 115 lbs, x 8  Therapeutic Activity 2: 1b 12in DL box jump 4 x 5  Therapeutic Activity 3: 2a. Walking Lunge, 35 lb KB in L, 8x40 feet  Therapeutic Activity 4: 2b Runner Exchange 1-2 step in forward lean, 4x10 each  Therapeutic Activity 5: 3a Landmine quick squat to lunge and overhead press 3 x 8 ea  Therapeutic Activity 6: 3b 6in step  Subjective     Patient ID: Theo Matthew Jr. is a 54 y.o. male.    Chief Complaint:Follow-up      History of Present Illness    Accompanied by his wife today.    Patient is a 54 y.o. right hand dominant male PMH of HLD who presents today due to right long finger dorsal cellulitis caused by a laceration from a fishing knife 5.25.24. Had scaled fish and was sharpening to filet. Cut finger. Cleaned and applied neosporin.  3 days later worsened with swelling of finger and up hand to fist. Went to  and antibiotics and steroids given. Swelling improved but went for follow up and admitted.  I&D was performed by an outside hospital with serosanguineous drainage and cultures were sent.  He was transferred to [facility] on 06/03/2024 and MRI showed cellulitis and mild extensor tenosynovitis.  He received IV antibiotics while inpatient.  He was discharged on oral doxycycline and amoxicillin.       He is retired but very active with fishing, working in yard, around the house.     Tetanus vaccine in 2016.     Underwent on 6/11/24:  Post-Operative Diagnosis: Post-Op Diagnosis Codes:     * Cellulitis of right middle finger [L03.011]     * Laceration of tendon of finger [S66.929A]  Right long finger traumatic arthrotomy  Right long extensor tenosynovitis  Right long extensor tendon adhesions     Cultures from surgery tendon/joint now positive for pseudomonas. S to cipro.    Started Cipro 6/14/24. Completed 6 weeks 7/26/24.  No difficulty tolerating.   Finger continues to improve. Can see wrinkles from decreased swelling.  Participating in exercises. Is moving DIP better. Not yet PIP.  Has follow up with Dr. Chamberlain.    Is seeing urology- had UTI so has been on cipro again.    Review of Systems   Constitutional: Negative for chills, decreased appetite, fever, malaise/fatigue, night sweats, weight gain and weight loss.   HENT:  Negative for congestion, ear pain, hearing loss, hoarse voice, sore throat and tinnitus.    Eyes:   Negative for blurred vision, redness and visual disturbance.   Cardiovascular:  Negative for chest pain, leg swelling and palpitations.   Respiratory:  Negative for cough, hemoptysis, shortness of breath, sputum production and wheezing.    Hematologic/Lymphatic: Negative for adenopathy. Does not bruise/bleed easily.   Skin:  Negative for dry skin, itching, rash and suspicious lesions.   Musculoskeletal:  Negative for back pain, joint pain, myalgias and neck pain.   Gastrointestinal:  Negative for abdominal pain, constipation, diarrhea, heartburn, nausea and vomiting.   Genitourinary:  Negative for dysuria, flank pain, frequency, hematuria, hesitancy and urgency.   Neurological:  Negative for dizziness, headaches, numbness, paresthesias and weakness.   Psychiatric/Behavioral:  Negative for depression and memory loss. The patient does not have insomnia and is not nervous/anxious.         Objective   Physical Exam  Vitals and nursing note reviewed.   Constitutional:       General: He is not in acute distress.     Appearance: Normal appearance. He is well-developed. He is not toxic-appearing or diaphoretic.   HENT:      Head: Normocephalic and atraumatic. No right periorbital erythema or left periorbital erythema.      Right Ear: External ear normal.      Left Ear: External ear normal.      Nose: Nose normal.   Eyes:      General: Lids are normal. No scleral icterus.        Right eye: No discharge.         Left eye: No discharge.      Conjunctiva/sclera:      Right eye: Right conjunctiva is not injected.      Left eye: Left conjunctiva is not injected.      Pupils: Pupils are equal, round, and reactive to light.   Cardiovascular:      Rate and Rhythm: Normal rate.   Pulmonary:      Effort: Pulmonary effort is normal. No tachypnea or respiratory distress.      Breath sounds: No stridor.   Abdominal:      General: There is no distension.   Musculoskeletal:         General: Swelling present.      Cervical back: Normal  alternating rocket jumps 3 x 8 ea LE    Assessment:  PT Assessment  Assessment Comment: Patient with increased fatigue this session likely due to developing a cold.  Increased rest times noted throughout the session.  Cues to prevent pronation of left foot at depth of squat compared to right.  Improved symmetry with jumping as well as utilize unilateral jumping for greater control and explosiveness for return to running progression.    Plan:  OP PT Plan  Treatment/Interventions: Electrical stimulation, Education/ Instruction, Dry needling, Neuromuscular re-education, Self care/ home management, Therapeutic activities, Therapeutic exercises, Manual therapy, Blood flow restriction therapy, Vasopneumatic device  PT Plan: Skilled PT (Continue to progress strengthening per MD protocol)  PT Frequency: 2 times per week  Duration: 12 weeks  Number of Treatments Authorized: 12 of [facility] (10 visits completed in [facility] in 2024)    Goals:  Active       PT Problem       STG       Start:  10/28/24    Expected End:  12/10/24       1) Patient will improve LEFS score by 9 points in order to perform functional activities at home and in the community.  2) Patient will be able to complete ADLs with pain in knee less than 4/10.  3) Pt will improve knee knee ROM to 135 degrees after surgery to be able to complete ADLs with less difficulty.  4) Patient will be independent with HEP to allow for continued improvement in daily tasks at home and in the community in 3 visits.              LTG       Start:  10/28/24    Expected End:  12/31/24       1) Patient will have 5/5 strength in hip musculature to aid in balance with ambulation on varied surfaces in community.  2) Patient will be able to perform proper squatting technique in order to reduce compression on knee and prevent increased pain with daily tasks.  3) Patient will be able to perform >30 seconds of SLS on even ground in order to allow for safe ambulation on all levels.   4) Patient  range of motion. No erythema.      Comments: Right dip decreased ROM.  PIP mildly swollen.     Skin:     General: Skin is warm and dry.      Findings: No erythema or rash.   Neurological:      Mental Status: He is alert and oriented to person, place, and time.      Cranial Nerves: No cranial nerve deficit.      Coordination: Coordination normal.   Psychiatric:         Speech: Speech normal.         Behavior: Behavior normal. Behavior is cooperative.         Thought Content: Thought content normal.         Judgment: Judgment normal.            Assessment and Plan     1. Pseudomonas arthritis    2. Prostate mass    3. Renal insufficiency          Plan:  Theo was seen today for follow-up.    Diagnoses and all orders for this visit:    Pseudomonas arthritis    Prostate mass    Renal insufficiency            Reviewed all labs and culture results.  Concern for extensive infection and treated as osteomyelitis per Dr. Chamberlain.    Cipro 750mg po bid for 6 weeks- completed 7/26/24. Also recent cipro due to UTI.  .  Finger clinically improving. Still decreased ROM.  Mildly swollen.  Labs reviewed with patient.  Mild anemia- discussed diet.  Add multi vit q day.  Will follow.  Follow up with urology.   Discussed with patient and his wife concerns about the scan results. Reviewed with them but asked them to discuss more with urologist.     RTC 4-6 weeks with labs prior.         will improve LEFS score >/=70/80 points in order to perform functional activities at home and in the community by discharge.                Time Calculation  Start Time: 1230  Stop Time: 1316  Time Calculation (min): 46 min  PT Therapeutic Procedures Time Entry  Therapeutic Exercise Time Entry: 12  Therapeutic Activity Time Entry: 32,

## 2025-03-25 ENCOUNTER — TELEPHONE (OUTPATIENT)
Dept: UROLOGY | Facility: CLINIC | Age: 55
End: 2025-03-25
Payer: COMMERCIAL

## 2025-03-25 DIAGNOSIS — C61 PROSTATE CANCER: Primary | ICD-10-CM

## 2025-03-28 ENCOUNTER — PATIENT MESSAGE (OUTPATIENT)
Dept: UROLOGY | Facility: CLINIC | Age: 55
End: 2025-03-28
Payer: COMMERCIAL

## 2025-03-28 ENCOUNTER — LAB VISIT (OUTPATIENT)
Dept: LAB | Facility: HOSPITAL | Age: 55
End: 2025-03-28
Attending: INTERNAL MEDICINE
Payer: COMMERCIAL

## 2025-03-28 ENCOUNTER — OFFICE VISIT (OUTPATIENT)
Dept: PRIMARY CARE CLINIC | Facility: CLINIC | Age: 55
End: 2025-03-28
Payer: COMMERCIAL

## 2025-03-28 VITALS
OXYGEN SATURATION: 98 % | BODY MASS INDEX: 26.26 KG/M2 | SYSTOLIC BLOOD PRESSURE: 122 MMHG | DIASTOLIC BLOOD PRESSURE: 76 MMHG | HEART RATE: 85 BPM | WEIGHT: 163.38 LBS | HEIGHT: 66 IN

## 2025-03-28 DIAGNOSIS — Z00.00 ANNUAL PHYSICAL EXAM: ICD-10-CM

## 2025-03-28 DIAGNOSIS — E78.2 MIXED HYPERLIPIDEMIA: Primary | ICD-10-CM

## 2025-03-28 DIAGNOSIS — C79.51 METASTASIS TO BONE: ICD-10-CM

## 2025-03-28 DIAGNOSIS — E66.3 OVERWEIGHT: ICD-10-CM

## 2025-03-28 DIAGNOSIS — C61 PROSTATE CANCER: ICD-10-CM

## 2025-03-28 PROBLEM — M77.9 EXTENSOR TENDON ADHESIONS: Status: RESOLVED | Noted: 2024-06-11 | Resolved: 2025-03-28

## 2025-03-28 PROBLEM — R97.20 ELEVATED PSA: Status: RESOLVED | Noted: 2024-09-03 | Resolved: 2025-03-28

## 2025-03-28 PROBLEM — L03.011 CELLULITIS OF RIGHT MIDDLE FINGER: Status: RESOLVED | Noted: 2024-06-03 | Resolved: 2025-03-28

## 2025-03-28 PROBLEM — N42.89 PROSTATE MASS: Status: RESOLVED | Noted: 2024-09-27 | Resolved: 2025-03-28

## 2025-03-28 PROBLEM — M65.949 EXTENSOR TENOSYNOVITIS OF FINGER: Status: RESOLVED | Noted: 2024-06-11 | Resolved: 2025-03-28

## 2025-03-28 PROBLEM — S66.929A LACERATION OF TENDON OF FINGER: Status: RESOLVED | Noted: 2024-06-11 | Resolved: 2025-03-28

## 2025-03-28 PROBLEM — R31.0 GROSS HEMATURIA: Status: RESOLVED | Noted: 2024-09-03 | Resolved: 2025-03-28

## 2025-03-28 PROBLEM — M79.644 PAIN OF RIGHT MIDDLE FINGER: Status: RESOLVED | Noted: 2024-06-14 | Resolved: 2025-03-28

## 2025-03-28 PROBLEM — M25.641 STIFFNESS OF FINGER JOINT OF RIGHT HAND: Status: RESOLVED | Noted: 2024-06-14 | Resolved: 2025-03-28

## 2025-03-28 LAB
ABSOLUTE EOSINOPHIL (OHS): 0.01 K/UL
ABSOLUTE MONOCYTE (OHS): 0.53 K/UL (ref 0.3–1)
ABSOLUTE NEUTROPHIL COUNT (OHS): 2.41 K/UL (ref 1.8–7.7)
ALBUMIN SERPL BCP-MCNC: 4.1 G/DL (ref 3.5–5.2)
ALP SERPL-CCNC: 63 UNIT/L (ref 40–150)
ALT SERPL W/O P-5'-P-CCNC: 33 UNIT/L (ref 10–44)
ANION GAP (OHS): 10 MMOL/L (ref 8–16)
AST SERPL-CCNC: 29 UNIT/L (ref 11–45)
BASOPHILS # BLD AUTO: 0.03 K/UL
BASOPHILS NFR BLD AUTO: 0.6 %
BILIRUB SERPL-MCNC: 0.8 MG/DL (ref 0.1–1)
BUN SERPL-MCNC: 15 MG/DL (ref 6–20)
CALCIUM SERPL-MCNC: 10.4 MG/DL (ref 8.7–10.5)
CHLORIDE SERPL-SCNC: 104 MMOL/L (ref 95–110)
CHOLEST SERPL-MCNC: 185 MG/DL (ref 120–199)
CHOLEST/HDLC SERPL: 3.8 {RATIO} (ref 2–5)
CO2 SERPL-SCNC: 24 MMOL/L (ref 23–29)
CREAT SERPL-MCNC: 0.9 MG/DL (ref 0.5–1.4)
EAG (OHS): 100 MG/DL (ref 68–131)
ERYTHROCYTE [DISTWIDTH] IN BLOOD BY AUTOMATED COUNT: 15.1 % (ref 11.5–14.5)
GFR SERPLBLD CREATININE-BSD FMLA CKD-EPI: >60 ML/MIN/1.73/M2
GLUCOSE SERPL-MCNC: 104 MG/DL (ref 70–110)
HBA1C MFR BLD: 5.1 % (ref 4–5.6)
HCT VFR BLD AUTO: 39.3 % (ref 40–54)
HDLC SERPL-MCNC: 49 MG/DL (ref 40–75)
HDLC SERPL: 26.5 % (ref 20–50)
HGB BLD-MCNC: 12.7 GM/DL (ref 14–18)
IMM GRANULOCYTES # BLD AUTO: 0.01 K/UL (ref 0–0.04)
IMM GRANULOCYTES NFR BLD AUTO: 0.2 % (ref 0–0.5)
LDLC SERPL CALC-MCNC: 127.6 MG/DL (ref 63–159)
LYMPHOCYTES # BLD AUTO: 2.46 K/UL (ref 1–4.8)
MCH RBC QN AUTO: 29 PG (ref 27–50)
MCHC RBC AUTO-ENTMCNC: 32.3 G/DL (ref 32–36)
MCV RBC AUTO: 90 FL (ref 82–98)
NONHDLC SERPL-MCNC: 136 MG/DL
NUCLEATED RBC (/100WBC) (OHS): 0 /100 WBC
PLATELET # BLD AUTO: 257 K/UL (ref 150–450)
PMV BLD AUTO: 11.3 FL (ref 9.2–12.9)
POTASSIUM SERPL-SCNC: 4.2 MMOL/L (ref 3.5–5.1)
PROT SERPL-MCNC: 8 GM/DL (ref 6–8.4)
RBC # BLD AUTO: 4.38 M/UL (ref 4.6–6.2)
RELATIVE EOSINOPHIL (OHS): 0.2 %
RELATIVE LYMPHOCYTE (OHS): 45.1 % (ref 18–48)
RELATIVE MONOCYTE (OHS): 9.7 % (ref 4–15)
RELATIVE NEUTROPHIL (OHS): 44.2 % (ref 38–73)
SODIUM SERPL-SCNC: 138 MMOL/L (ref 136–145)
TRIGL SERPL-MCNC: 42 MG/DL (ref 30–150)
WBC # BLD AUTO: 5.45 K/UL (ref 3.9–12.7)

## 2025-03-28 PROCEDURE — 80061 LIPID PANEL: CPT

## 2025-03-28 PROCEDURE — 82947 ASSAY GLUCOSE BLOOD QUANT: CPT

## 2025-03-28 PROCEDURE — 99999 PR PBB SHADOW E&M-EST. PATIENT-LVL IV: CPT | Mod: PBBFAC,,, | Performed by: INTERNAL MEDICINE

## 2025-03-28 PROCEDURE — 83036 HEMOGLOBIN GLYCOSYLATED A1C: CPT

## 2025-03-28 PROCEDURE — 85025 COMPLETE CBC W/AUTO DIFF WBC: CPT

## 2025-03-28 PROCEDURE — 36415 COLL VENOUS BLD VENIPUNCTURE: CPT

## 2025-03-28 NOTE — ASSESSMENT & PLAN NOTE
Not exercising, has been staying in [place] during the week.  Doing keto diet.  Lost 15 pound since last year.

## 2025-03-28 NOTE — PROGRESS NOTES
[facility] Note    Chief Complaint      Chief Complaint   Patient presents with    Annual Exam     History of Present Illness      Theo Matthew Jr. is a 54 y.o. male who presents today for Annual preventative visit.  Patient comes to appointment alone.  Uro: Linda    Problem List Items Addressed This Visit       Mixed hyperlipidemia - Primary    Current Assessment & Plan   Not on meds. no family hx of stroke/MI.  Mom has HTN.  The 10-year ASCVD risk score (Jose WINN, et al., 2019) is: 5.6%    Values used to calculate the score:      Age: 54 years      Sex: Male      Is Non- : Yes      Diabetic: No      Tobacco smoker: No      Systolic Blood Pressure: 110 mmHg      Is BP treated: No      HDL Cholesterol: 31 mg/dL      Total Cholesterol: 167 mg/dL           Overweight    Current Assessment & Plan   Not exercising, has been staying in [place] during the week.  Doing keto diet.  Lost 15 pound since last year.         Relevant Orders    Hemoglobin A1C    Prostate cancer    Overview   de mark low volume metastatic prostate cancer 09/2024 in the setting of hematuria and several years of elevated PSA. Initial staging with extensive local progression into the bladder wityh associated pelvic adenopathy and a single L3 osseous metastases. Pathology with [place] 4+5 acinar adenocarcinoma with neuroendocrine features.         Current Assessment & Plan   Seeing Dr. Mckeon, on androgen dep therapy. Completed chemo, now doing [facility] tx through [facility] [place], doing cystoscope every 6 months, scheduled again in 7/2025.  No urinary retention, hematuria.         Metastasis to bone    [Metastasis to lymph nodes]     Other Visit Diagnoses         Annual physical exam        Relevant Orders    CBC Auto Differential    Lipid Panel    Comprehensive Metabolic Panel              Health Maintenance   Topic Date Due    Shingles Vaccine (1 of 2) 03/28/2026 (Originally 5/31/1989)    COVID-19 Vaccine (1)  03/28/2026 (Originally 5/31/1975)    Pneumococcal Vaccines (Age 50+) (1 of 2 - PCV) 03/28/2026 (Originally [birth year]/1988)    TETANUS VACCINE  08/22/2026    Hemoglobin A1c (Diabetic Prevention Screening)  06/04/2027    Lipid Panel  06/04/2029    Colorectal Cancer Screening  02/18/2030    RSV Vaccine (Age 60+ and Pregnant patients) (1 - 1-dose 75+ series) 05/31/2044    Hepatitis C Screening  Completed    HIV Screening  Completed    Influenza Vaccine  Addressed       Past Medical History:   Diagnosis Date    Internal hemorrhoid        Past Surgical History:   Procedure Laterality Date    COLONOSCOPY  02/18/2020    COLONOSCOPY N/A 2/18/2020    Procedure: COLONOSCOPY/Suprep;  Surgeon: Agueda Jacques MD;  Location: [facility];  Service: Endoscopy;  Laterality: N/A;    CYSTOSCOPY W/ URETERAL STENT PLACEMENT Bilateral 1/10/2025    Procedure: CYSTOSCOPY, WITH URETERAL STENT INSERTION;  Surgeon: Isaiah Mckeon MD;  Location: [facility] OR [address] [place] [address];  Service: Urology;  Laterality: Bilateral;    IRRIGATION AND DEBRIDEMENT OF UPPER EXTREMITY Right 6/11/2024    Procedure: IRRIGATION AND DEBRIDEMENT, UPPER EXTREMITY;  Surgeon: Maryjo Chamberlain MD;  Location: [facility];  Service: Orthopedics;  Laterality: Right;    KNEE LIGAMENT RECONSTRUCTION Right     PROSTATE BIOPSY N/A 9/27/2024    Procedure: BIOPSY, PROSTATE/TRANSPERINEAL;  Surgeon: Isaiah Mckeon MD;  [facility];  Service: Urology;  Laterality: N/A;    REMOVAL-STENT Bilateral 1/10/2025    Procedure: REMOVAL-STENT;  Surgeon: Isaiah Mckeon MD;  [facility];  Service: Urology;  Laterality: Bilateral;    REPAIR OF EXTENSOR TENDON Right 6/11/2024    Procedure: REPAIR, TENDON, EXTENSOR LONG FINGER;  Surgeon: Maryjo Chamberlain MD;  Location: [facility] OR;  Service: Orthopedics;  Laterality: Right;    RETROGRADE PYELOGRAPHY Bilateral 9/27/2024    Procedure: PYELOGRAM, RETROGRADE;  Surgeon: Isaiah Mckeon MD;  [facility];  Service: Urology;  Laterality:  Bilateral;    RETROGRADE PYELOGRAPHY Bilateral 1/10/2025    Procedure: PYELOGRAM, RETROGRADE;  Surgeon: Isaiah Mckeon MD;  Location: [facility] OR [address] [place] [address];  Service: Urology;  Laterality: Bilateral;    TENOLYSIS, EXTENSOR, HAND OR FINGER Right 6/11/2024    Procedure: TENOLYSIS, EXTENSOR, HAND OR FINGER;  Surgeon: Maryjo Chamberlain MD;  Location: [facility] OR;  Service: Orthopedics;  Laterality: Right;    TENOSYNOVECTOMY Right 6/11/2024    Procedure: TENOSYNOVECTOMY, EXTENSOR TENDON LONG FINGER;  Surgeon: Maryjo Chamberlain MD;  Location: [facility] OR;  Service: Orthopedics;  Laterality: Right;    TRANSRECTAL ULTRASOUND EXAMINATION N/A 9/27/2024    Procedure: ULTRASOUND, RECTAL APPROACH;  Surgeon: Isaiah Mckeon MD;  Location: [facility] OR [address] [place] [address];  Service: Urology;  Laterality: N/A;    TRANSURETHRAL RESECTION OF PROSTATE N/A 9/27/2024    Procedure: TURP (TRANSURETHRAL RESECTION OF PROSTATE);  Surgeon: Isaiah Mckeon MD;  Location: [facility] OR [address] [place] [address];  Service: Urology;  Laterality: N/A;    URETERAL STENT PLACEMENT Bilateral 9/27/2024    Procedure: INSERTION, STENT, URETER;  Surgeon: Isaiah Mckeon MD;  Location: [facility] OR [address] [place] [address];  Service: Urology;  Laterality: Bilateral;       family history includes Alcohol abuse in his father; Arthritis in his paternal grandmother; Diabetes in his maternal grandfather; Hypertension in his mother; No Known Problems in his paternal grandfather and sister; Pancreatic cancer in his maternal grandmother; Prostate cancer in his father and maternal grandfather; Seizures in his father.    Social History     Tobacco Use    Smoking status: Never    Smokeless tobacco: Never   Substance Use Topics    Alcohol use: No    Drug use: No       Review of Systems   Constitutional:  Negative for chills and fever.   Respiratory:  Negative for cough and shortness of breath.    Cardiovascular:  Negative for chest pain and palpitations.   Gastrointestinal:  Negative for constipation, diarrhea, nausea and vomiting.    Genitourinary:  Negative for dysuria and hematuria.   Musculoskeletal:  Negative for falls.   Neurological:  Negative for headaches.        Outpatient Encounter Medications as of 3/28/2025   Medication Sig Dispense Refill    FENBENDAZOLE, BULK, MISC 222 mg by Misc.(Non-Drug; Combo Route) route Daily.      inositol/Mv Comb 1/mohini/betaine (CARDIOVASCULAR SUPPORT ORAL) Take by mouth.      INV farnaz-009, nab-rapamycin, (INV FARNAZ-009, NAB-RAPAMYCIN,) 5 mg/mL injection Inject 5 mg into the vein every 7 days. Takes on Sunday // PO Tablet      INV ivermectin tablet Take 12 mg by mouth once daily.      itraconazole (SPORANOX) 100 mg Cap Take 200 mg by mouth once daily.      lactase (LACTAID) 3,000 unit tablet Take 1 tablet by mouth 3 (three) times daily with meals.      Lactobacillus rhamnosus GG (CULTURELLE) 10 billion cell capsule Take 1 capsule by mouth once daily.      melatonin 10 mg Cap Take 60 mg by mouth.      multivitamin with minerals tablet Take 1 tablet by mouth once daily.      naltrexone HCl (NALTREXONE ORAL) Take 3 mg by mouth once daily.      omega-3 fatty acids/fish oil (FISH OIL-OMEGA-3 FATTY ACIDS) 300-1,000 mg capsule Take by mouth once daily.      UNABLE TO FIND medication name: mistletoe injection      UNABLE TO FIND medication name: thymosin injection      UNABLE TO FIND medication name: mycoceutics immune max      UNABLE TO FIND medication name: pectaSol-C, Prof, Lime 552grams 1 scoop      UNABLE TO FIND medication name: green tea-70      UNABLE TO FIND medication name: digestive enzyme with breakfast      UNABLE TO FIND medication name: AIC-SG Liquid      UNABLE TO FIND medication name: liposomal methylene (Nutramax)      UNABLE TO FIND medication name: LWN Essential Aminos Powder (2scoops)      UNABLE TO FIND medication name: greens with perfect amino (1 scoop)      UNABLE TO FIND medication name: maraGreen Liquid      UNABLE TO FIND medication name: LW Multi Complete      gabapentin (NEURONTIN) 300  "MG capsule Take 600 mg by mouth 2 (two) times daily. Pre procedure w/Hypothermia treatment (Patient not taking: Reported on 3/28/2025)      [DISCONTINUED] abiraterone (ZYTIGA) 500 mg Tab Take 2 tablets (1,000 mg total) by mouth once daily Take as directed on an empty stomach.. 60 tablet 5    [DISCONTINUED] bicalutamide (CASODEX) 50 MG Tab Take 1 tablet (50 mg total) by mouth once daily. 30 tablet 0    [DISCONTINUED] cholecalciferol, vitamin D3, (VITAMIN D3) 25 mcg (1,000 unit) capsule Take 1 capsule (1,000 Units total) by mouth once daily. 30 capsule 11    [DISCONTINUED] oxybutynin (DITROPAN) 5 MG Tab Take 1 tablet (5 mg total) by mouth 3 (three) times daily as needed (bladder spasms). 30 tablet 2    [DISCONTINUED] predniSONE (DELTASONE) 5 MG tablet Take 1 tablet (5 mg total) by mouth once daily. Take as directed with water on an empty stomach. 30 tablet 5     No facility-administered encounter medications on file as of 3/28/2025.        Review of patient's allergies indicates:  No Known Allergies    Physical Exam      Vital Signs  Pulse: 85  SpO2: 98 %  BP: 122/76  Pain Score: 0-No pain  Height and Weight  Height: 5' 6" (167.6 cm)  Weight: 74.1 kg (163 lb 5.8 oz)  BSA (Calculated - sq m): 1.86 sq meters  BMI (Calculated): 26.4  Weight in (lb) to have BMI = 25: 154.6]    Physical Exam  Constitutional:       Appearance: He is well-developed.   HENT:      Head: Normocephalic and atraumatic.   Neck:      Thyroid: No thyromegaly.   Cardiovascular:      Rate and Rhythm: Normal rate and regular rhythm.      Heart sounds: No murmur heard.  Pulmonary:      Effort: Pulmonary effort is normal. No respiratory distress.      Breath sounds: Normal breath sounds.   Abdominal:      General: There is no distension.      Palpations: Abdomen is soft.      Tenderness: There is no abdominal tenderness.   Skin:     General: Skin is warm and dry.   Neurological:      Mental Status: He is alert and oriented to person, place, and time. " "  Psychiatric:         Behavior: Behavior normal.          Laboratory:  CBC:  No results for input(s): "WBC", "RBC", "HGB", "HCT", "PLT", "MCV", "MCH", "MCHC" in the last 2160 hours.  CMP:  No results for input(s): "GLU", "CALCIUM", "ALBUMIN", "PROT", "NA", "K", "CO2", "CL", "BUN", "ALKPHOS", "ALT", "AST", "BILITOT" in the last 2160 hours.    Invalid input(s): "CREATININ"  URINALYSIS:  Recent Labs   Lab Result Units 01/03/25  1126   Color, UA  Green*   Specific Gravity, UA  <=1.005*   pH, UA  6.0   Protein, UA  Negative   Nitrite, UA  Negative   Leukocytes, UA  Trace*   Urobilinogen, UA EU/dL Negative      LIPIDS:  No results for input(s): "TSH", "HDL", "CHOL", "TRIG", "LDLCALC", "CHOLHDL", "NONHDLCHOL", "TOTALCHOLEST" in the last 2160 hours.  TSH:  No results for input(s): "TSH" in the last 2160 hours.  A1C:  No results for input(s): "HGBA1C" in the last 2160 hours.    Radiology:  No results found in the last 30 days.     Assessment/Plan     Theo Matthew Jr. is a 54 y.o.male with:    1. Mixed hyperlipidemia    2. Overweight  - Hemoglobin A1C; Future    3. Metastasis to bone    4. Prostate cancer    5. Annual physical exam  - CBC Auto Differential; Future  - Lipid Panel; Future  - Comprehensive Metabolic Panel; Future      -labs ordered  -counseled on vaccines  -Continue current medications and maintain follow up with specialists.    -Follow up in about 1 year (around 3/28/2026) for Annual Visit.       Tenisha Becerra MD  [facility]                    "

## 2025-03-28 NOTE — ASSESSMENT & PLAN NOTE
Seeing Dr. Mckeon, on androgen dep therapy. Completed chemo, now doing [facility] tx through [facility] [place], doing cystoscope every 6 months, scheduled again in 7/2025.  No urinary retention, hematuria.

## 2025-03-28 NOTE — ASSESSMENT & PLAN NOTE
Not on meds. no family hx of stroke/MI.  Mom has HTN.  The 10-year ASCVD risk score (Jose WINN, et al., 2019) is: 5.6%    Values used to calculate the score:      Age: 54 years      Sex: Male      Is Non- : Yes      Diabetic: No      Tobacco smoker: No      Systolic Blood Pressure: 110 mmHg      Is BP treated: No      HDL Cholesterol: 31 mg/dL      Total Cholesterol: 167 mg/dL

## 2025-03-31 ENCOUNTER — RESULTS FOLLOW-UP (OUTPATIENT)
Dept: PRIMARY CARE CLINIC | Facility: CLINIC | Age: 55
End: 2025-03-31

## 2025-04-02 ENCOUNTER — HOSPITAL ENCOUNTER (OUTPATIENT)
Dept: RADIOLOGY | Facility: HOSPITAL | Age: 55
Discharge: HOME | End: 2025-04-02
Attending: UROLOGY
Payer: COMMERCIAL

## 2025-04-02 DIAGNOSIS — C61 PROSTATE CANCER: ICD-10-CM

## 2025-04-02 PROCEDURE — 25500020 PHARM REV CODE 255: Performed by: UROLOGY

## 2025-04-02 PROCEDURE — 74177 CT ABD & PELVIS W/CONTRAST: CPT | Mod: 26,,, | Performed by: RADIOLOGY

## 2025-04-02 PROCEDURE — 74177 CT ABD & PELVIS W/CONTRAST: CPT | Mod: TC

## 2025-04-02 RX ADMIN — IOHEXOL 100 ML: 350 INJECTION, SOLUTION INTRAVENOUS at 11:04

## 2025-04-02 RX ADMIN — IOHEXOL 30 ML: 350 INJECTION, SOLUTION INTRAVENOUS at 10:04

## 2025-04-04 ENCOUNTER — RESULTS FOLLOW-UP (OUTPATIENT)
Dept: UROLOGY | Facility: CLINIC | Age: 55
End: 2025-04-04

## 2025-04-07 ENCOUNTER — PATIENT MESSAGE (OUTPATIENT)
Dept: UROLOGY | Facility: CLINIC | Age: 55
End: 2025-04-07
Payer: COMMERCIAL

## 2025-04-10 ENCOUNTER — PATIENT MESSAGE (OUTPATIENT)
Dept: HEMATOLOGY/ONCOLOGY | Facility: CLINIC | Age: 55
End: 2025-04-10
Payer: COMMERCIAL

## 2025-04-16 ENCOUNTER — HOSPITAL ENCOUNTER (OUTPATIENT)
Dept: RADIOLOGY | Facility: HOSPITAL | Age: 55
Discharge: HOME | End: 2025-04-16
Attending: UROLOGY
Payer: COMMERCIAL

## 2025-04-16 DIAGNOSIS — C61 PROSTATE CANCER: ICD-10-CM

## 2025-04-16 PROCEDURE — 78306 BONE IMAGING WHOLE BODY: CPT | Mod: 26,,, | Performed by: RADIOLOGY

## 2025-04-16 PROCEDURE — A9503 TC99M MEDRONATE: HCPCS | Performed by: UROLOGY

## 2025-04-16 PROCEDURE — 78306 BONE IMAGING WHOLE BODY: CPT | Mod: TC

## 2025-04-16 RX ADMIN — TECHNETIUM TC 99M MEDRONATE 23 MILLICURIE: 20 INJECTION, POWDER, LYOPHILIZED, FOR SOLUTION INTRAVENOUS at 09:04

## 2025-07-02 ENCOUNTER — HOSPITAL ENCOUNTER (EMERGENCY)
Facility: HOSPITAL | Age: 55
Discharge: HOME | End: 2025-07-02
Attending: EMERGENCY MEDICINE
Payer: COMMERCIAL

## 2025-07-02 VITALS
HEIGHT: 66 IN | OXYGEN SATURATION: 97 % | HEART RATE: 90 BPM | BODY MASS INDEX: 25.07 KG/M2 | RESPIRATION RATE: 19 BRPM | TEMPERATURE: 98 F | DIASTOLIC BLOOD PRESSURE: 85 MMHG | WEIGHT: 156 LBS | SYSTOLIC BLOOD PRESSURE: 152 MMHG

## 2025-07-02 DIAGNOSIS — Z96.0 URETERAL STENT PRESENT: ICD-10-CM

## 2025-07-02 DIAGNOSIS — N30.90 CYSTITIS: Primary | ICD-10-CM

## 2025-07-02 PROBLEM — N39.0 COMPLICATED UTI (URINARY TRACT INFECTION): Status: ACTIVE | Noted: 2025-07-02

## 2025-07-02 LAB
ABSOLUTE EOSINOPHIL (OHS): 0.03 K/UL
ABSOLUTE MONOCYTE (OHS): 0.44 K/UL (ref 0.3–1)
ABSOLUTE NEUTROPHIL COUNT (OHS): 1.94 K/UL (ref 1.8–7.7)
ALBUMIN SERPL BCP-MCNC: 4.4 G/DL (ref 3.5–5.2)
ALP SERPL-CCNC: 147 UNIT/L (ref 40–150)
ALT SERPL W/O P-5'-P-CCNC: 18 UNIT/L (ref 10–44)
ANION GAP (OHS): 9 MMOL/L (ref 8–16)
AST SERPL-CCNC: 31 UNIT/L (ref 11–45)
BACTERIA #/AREA URNS AUTO: ABNORMAL /HPF
BASOPHILS # BLD AUTO: 0.03 K/UL
BASOPHILS NFR BLD AUTO: 0.6 %
BILIRUB SERPL-MCNC: 0.5 MG/DL (ref 0.1–1)
BILIRUB UR QL STRIP.AUTO: NEGATIVE
BUN SERPL-MCNC: 16 MG/DL (ref 6–20)
CALCIUM SERPL-MCNC: 9.5 MG/DL (ref 8.7–10.5)
CHLORIDE SERPL-SCNC: 108 MMOL/L (ref 95–110)
CLARITY UR: ABNORMAL
CO2 SERPL-SCNC: 27 MMOL/L (ref 23–29)
COLOR UR AUTO: ABNORMAL
CREAT SERPL-MCNC: 1.2 MG/DL (ref 0.5–1.4)
ERYTHROCYTE [DISTWIDTH] IN BLOOD BY AUTOMATED COUNT: 13.7 % (ref 11.5–14.5)
GFR SERPLBLD CREATININE-BSD FMLA CKD-EPI: >60 ML/MIN/1.73/M2
GLUCOSE SERPL-MCNC: 103 MG/DL (ref 70–110)
GLUCOSE UR QL STRIP: ABNORMAL
HCT VFR BLD AUTO: 41.5 % (ref 40–54)
HGB BLD-MCNC: 13.9 GM/DL (ref 14–18)
HGB UR QL STRIP: ABNORMAL
HYALINE CASTS UR QL AUTO: 0 /LPF (ref 0–1)
IMM GRANULOCYTES # BLD AUTO: 0.01 K/UL (ref 0–0.04)
IMM GRANULOCYTES NFR BLD AUTO: 0.2 % (ref 0–0.5)
KETONES UR QL STRIP: NEGATIVE
LEUKOCYTE ESTERASE UR QL STRIP: ABNORMAL
LYMPHOCYTES # BLD AUTO: 2.26 K/UL (ref 1–4.8)
MCH RBC QN AUTO: 29 PG (ref 27–31)
MCHC RBC AUTO-ENTMCNC: 33.5 G/DL (ref 32–36)
MCV RBC AUTO: 87 FL (ref 82–98)
MICROSCOPIC COMMENT: ABNORMAL
NITRITE UR QL STRIP: NEGATIVE
NUCLEATED RBC (/100WBC) (OHS): 0 /100 WBC
PH UR STRIP: 6 [PH]
PLATELET # BLD AUTO: 168 K/UL (ref 150–450)
PMV BLD AUTO: 11.3 FL (ref 9.2–12.9)
POTASSIUM SERPL-SCNC: 4.6 MMOL/L (ref 3.5–5.1)
PROT SERPL-MCNC: 8 GM/DL (ref 6–8.4)
PROT UR QL STRIP: ABNORMAL
RBC # BLD AUTO: 4.79 M/UL (ref 4.6–6.2)
RBC #/AREA URNS AUTO: >100 /HPF (ref 0–4)
RELATIVE EOSINOPHIL (OHS): 0.6 %
RELATIVE LYMPHOCYTE (OHS): 48 % (ref 18–48)
RELATIVE MONOCYTE (OHS): 9.3 % (ref 4–15)
RELATIVE NEUTROPHIL (OHS): 41.3 % (ref 38–73)
SODIUM SERPL-SCNC: 144 MMOL/L (ref 136–145)
SP GR UR STRIP: 1.03
SQUAMOUS #/AREA URNS AUTO: 1 /HPF
UROBILINOGEN UR STRIP-ACNC: NEGATIVE EU/DL
WBC # BLD AUTO: 4.71 K/UL (ref 3.9–12.7)
WBC #/AREA URNS AUTO: 22 /HPF (ref 0–5)

## 2025-07-02 PROCEDURE — 80053 COMPREHEN METABOLIC PANEL: CPT | Performed by: EMERGENCY MEDICINE

## 2025-07-02 PROCEDURE — 99283 EMERGENCY DEPT VISIT LOW MDM: CPT

## 2025-07-02 PROCEDURE — 81001 URINALYSIS AUTO W/SCOPE: CPT | Performed by: EMERGENCY MEDICINE

## 2025-07-02 PROCEDURE — 87086 URINE CULTURE/COLONY COUNT: CPT | Performed by: EMERGENCY MEDICINE

## 2025-07-02 PROCEDURE — 85025 COMPLETE CBC W/AUTO DIFF WBC: CPT | Performed by: EMERGENCY MEDICINE

## 2025-07-02 RX ORDER — SULFAMETHOXAZOLE AND TRIMETHOPRIM 800; 160 MG/1; MG/1
1 TABLET ORAL 2 TIMES DAILY
Qty: 14 TABLET | Refills: 0 | Status: SHIPPED | OUTPATIENT
Start: 2025-07-02 | End: 2025-07-09

## 2025-07-02 NOTE — HPI
Theo Matthew Jr. Is a 55 y.o. M with a known history of Grade Group 5 prostate cancer with known metastasis to pelvic lymph nodes and chronic bilateral hydronephrosis, on non gold standard medical therapy, with insulin and ivermectin therapy done by a [facility] in [place]. He presents to the ED with concerns about small blood clots in his urine this week, with the passage of a large clot yesterday. This week he has felt dysuria, urgency, frequency, and hematuria. But denies fevers, chills, nausea or vomiting. This is the first time he has seen blood in his urine since the placement of his original stents. He is scheduled for a cystoscopy with stent exchange next week on 07/09/25.     In the ED his PVR 15, microscopic UA >100 rbc, 22 wbc, moderate bacteria. Leukocyte positive, nitrite negative. Cr 1.2, at baseline of 1.1-1.3. CMP within normal limits. Hgb 13.9 otherwise CBC normal. Vital signs stable, nonfebrile.    He states he primarily came in because he had not heard from clinic staff about the passage of his blood clot, but otherwise he feels fine.

## 2025-07-02 NOTE — ED NOTES
Theo Matthew Jr., a 55 y.o. male presents to the ED w/ complaint of blood spotting in urine x 1 week, reports 2 stents in urethra    Triage note:  Chief Complaint   Patient presents with    Hematuria     Started seeing specks a week ago, now seeing clots. Not on blood thinners. Reports he has 2 stents in urethra. Burning with urination      Review of patient's allergies indicates:  No Known Allergies  Past Medical History:   Diagnosis Date    Internal hemorrhoid

## 2025-07-02 NOTE — ED PROVIDER NOTES
Encounter Date: 7/2/2025       History     Chief Complaint   Patient presents with    Hematuria     Started seeing specks a week ago, now seeing clots. Not on blood thinners. Reports he has 2 stents in urethra. Burning with urination      HPI  55-year-old male with a history of prostate cancer with invasion of bladder, cystoscopy with ureteral stent placement in January of 2025.  Patient presents today with complaint of hematuria.  He reports he has been having some increasing bilateral flank pain that he believes is related to his stents.  He reports that he has having specks of blood in his urine for the last week in his now begun to pass a few clots.  He feels like he is not completely emptying his bladder.  He denies fever or chills.  He denies nausea or vomiting.  He reports he is due for a stent exchange in 1 week but would rather have a stent removal.  He denies anticoagulation.  Review of patient's allergies indicates:  No Known Allergies  Past Medical History:   Diagnosis Date    Complicated UTI (urinary tract infection) 7/2/2025    Internal hemorrhoid      Past Surgical History:   Procedure Laterality Date    COLONOSCOPY  02/18/2020    COLONOSCOPY N/A 2/18/2020    Procedure: COLONOSCOPY/Suprep;  Surgeon: Agueda Jacques MD;  Location: [facility];  Service: Endoscopy;  Laterality: N/A;    CYSTOSCOPY W/ URETERAL STENT PLACEMENT Bilateral 1/10/2025    Procedure: CYSTOSCOPY, WITH URETERAL STENT INSERTION;  Surgeon: Isaiah Mckeon MD;  Location: [facility] OR [address] [place] [address];  Service: Urology;  Laterality: Bilateral;    IRRIGATION AND DEBRIDEMENT OF UPPER EXTREMITY Right 6/11/2024    Procedure: IRRIGATION AND DEBRIDEMENT, UPPER EXTREMITY;  Surgeon: Maryjo Chamberlain MD;  Location: [facility] OR;  Service: Orthopedics;  Laterality: Right;    KNEE LIGAMENT RECONSTRUCTION Right     PROSTATE BIOPSY N/A 9/27/2024    Procedure: BIOPSY, PROSTATE/TRANSPERINEAL;  Surgeon: Isaiah Mckeon MD;  Location: [facility] OR [address] [place] [address];  Service: Urology;   Laterality: N/A;    REMOVAL-STENT Bilateral 1/10/2025    Procedure: REMOVAL-STENT;  Surgeon: Isaiah Mckeon MD;  [facility];  Service: Urology;  Laterality: Bilateral;    REPAIR OF EXTENSOR TENDON Right 6/11/2024    Procedure: REPAIR, TENDON, EXTENSOR LONG FINGER;  Surgeon: Maryjo Chamberlain MD;  Location: [facility] OR;  Service: Orthopedics;  Laterality: Right;    RETROGRADE PYELOGRAPHY Bilateral 9/27/2024    Procedure: PYELOGRAM, RETROGRADE;  Surgeon: Isaiah Mckeon MD;  [facility];  Service: Urology;  Laterality: Bilateral;    RETROGRADE PYELOGRAPHY Bilateral 1/10/2025    Procedure: PYELOGRAM, RETROGRADE;  Surgeon: Isaiah Mckeon MD;  [facility];  Service: Urology;  Laterality: Bilateral;    TENOLYSIS, EXTENSOR, HAND OR FINGER Right 6/11/2024    Procedure: TENOLYSIS, EXTENSOR, HAND OR FINGER;  Surgeon: Maryjo Chamberlain MD;  Location: [facility] OR;  Service: Orthopedics;  Laterality: Right;    TENOSYNOVECTOMY Right 6/11/2024    Procedure: TENOSYNOVECTOMY, EXTENSOR TENDON LONG FINGER;  Surgeon: Maryjo Chamberlain MD;  Location: [facility] OR;  Service: Orthopedics;  Laterality: Right;    TRANSRECTAL ULTRASOUND EXAMINATION N/A 9/27/2024    Procedure: ULTRASOUND, RECTAL APPROACH;  Surgeon: Isaiah Mckeon MD;  Location: [facility] OR [address] [place] [address];  Service: Urology;  Laterality: N/A;    TRANSURETHRAL RESECTION OF PROSTATE N/A 9/27/2024    Procedure: TURP (TRANSURETHRAL RESECTION OF PROSTATE);  Surgeon: Isaiah Mckeon MD;  Location: [facility] OR [address] [place] [address];  Service: Urology;  Laterality: N/A;    URETERAL STENT PLACEMENT Bilateral 9/27/2024    Procedure: INSERTION, STENT, URETER;  Surgeon: Isaiah Mckeon MD;  [facility];  Service: Urology;  Laterality: Bilateral;     Family History   Problem Relation Name Age of Onset    Hypertension Mother Mom     Seizures Father Theo Matthew Sr     Alcohol abuse Father Theo Matthew Sr     Prostate cancer Father Theo Matthew Sr     No Known Problems Sister       Pancreatic cancer Maternal Grandmother Chen Hernandez         passed 83 pancreate cancer    Diabetes Maternal Grandfather Lory Reeves     Prostate cancer Maternal Grandfather Lory Reeves     Arthritis Paternal Grandmother Lillian Boggs     No Known Problems Paternal Grandfather      Breast cancer Neg Hx      Ovarian cancer Neg Hx       Social History[1]  Review of Systems  All other systems reviewed and negative except as noted in HPI    Physical Exam     Initial Vitals [07/02/25 1536]   BP Pulse Resp Temp SpO2   (!) 152/85 90 19 98.3 °F (36.8 °C) 97 %      MAP       --         Physical Exam    Nursing note and vitals reviewed.  Constitutional: He appears well-developed and well-nourished.   HENT:   Head: Normocephalic and atraumatic.   Eyes: Conjunctivae and EOM are normal. Pupils are equal, round, and reactive to light.   Neck: Neck supple.   Normal range of motion.  Cardiovascular:  Normal rate, regular rhythm and intact distal pulses.           Pulmonary/Chest: Breath sounds normal. No respiratory distress. He exhibits no tenderness.   Abdominal: Abdomen is soft. He exhibits no distension. There is no rebound.   Musculoskeletal:         General: No tenderness or edema. Normal range of motion.      Cervical back: Normal range of motion and neck supple.     Neurological: He is alert and oriented to person, place, and time. He has normal strength. No cranial nerve deficit or sensory deficit. GCS score is 15. GCS eye subscore is 4. GCS verbal subscore is 5. GCS motor subscore is 6.   Skin: Skin is warm and dry. Capillary refill takes less than 2 seconds.   Psychiatric: He has a normal mood and affect.         ED Course   Procedures  Labs Reviewed   URINALYSIS, REFLEX TO URINE CULTURE - Abnormal       Result Value    Color, UA Orange (*)     Appearance, UA Hazy (*)     pH, UA 6.0      Spec Grav UA 1.030      Protein, UA 2+ (*)     Glucose, UA 1+ (*)     Ketones, UA Negative      Bilirubin, UA Negative      Blood, UA 3+  (*)     Nitrites, UA Negative      Urobilinogen, UA Negative      Leukocyte Esterase, UA 2+ (*)    CBC WITH DIFFERENTIAL - Abnormal    WBC 4.71      RBC 4.79      HGB 13.9 (*)     HCT 41.5      MCV 87      MCH 29.0      MCHC 33.5      RDW 13.7      Platelet Count 168      MPV 11.3      Nucleated RBC 0      Neut % 41.3      Lymph % 48.0      Mono % 9.3      Eos % 0.6      Basophil % 0.6      Imm Grans % 0.2      Neut # 1.94      Lymph # 2.26      Mono # 0.44      Eos # 0.03      Baso # 0.03      Imm Grans # 0.01     URINALYSIS MICROSCOPIC - Abnormal    RBC, UA >100 (*)     WBC, UA 22 (*)     Bacteria, UA Moderate (*)     Squamous Epithelial Cells, UA 1      Hyaline Casts, UA 0      Microscopic Comment       COMPREHENSIVE METABOLIC PANEL - Normal    Sodium 144      Potassium 4.6      Chloride 108      CO2 27      Glucose 103      BUN 16      Creatinine 1.2      Calcium 9.5      Protein Total 8.0      Albumin 4.4      Bilirubin Total 0.5            AST 31      ALT 18      Anion Gap 9      eGFR >60     CULTURE, URINE   CBC W/ AUTO DIFFERENTIAL    Narrative:     The following orders were created for panel order CBC auto differential.  Procedure                               Abnormality         Status                     ---------                               -----------         ------                     CBC with Differential[[id number]]       Abnormal            Final result                 Please view results for these tests on the individual orders.   GREY TOP URINE HOLD          Imaging Results    None          Medications - No data to display  Medical Decision Making  Concern for possible infection in the setting of indwelling ureteral stents and prostate cancer with bladder involvement versus potential progression to large volume hematuria.  Will review urinalysis when available.  CBC shows reassuring white blood cell count in hemoglobin/hematocrit.  Renal function is reassuring as are electrolytes.  Will  discuss with urology on-call. See ED course for additional HPI, ROS, PE, lab, imaging, consultant discussion, procedure interpretation, and Medical Decision Making.      Risk  Prescription drug management.               ED Course as of 07/02/25 2146 Wed Jul 02, 2025 1917 Discussed with urology who will evaluate at bedside due to history of high-grade prostate cancer [DS]   2022 I discussed the patient with urology on-call.  They recommend the patient follow up for stent exchange as previously scheduled.  I have prescribed him Bactrim due to the presence of urinary tract infection [DS]      ED Course User Index  [DS] Billy Samuel MD                           Clinical Impression:  Final diagnoses:  [N30.90] Cystitis (Primary)  [Z96.0] Ureteral stent present          ED Disposition Condition    Discharge Stable          ED Prescriptions       Medication Sig Dispense Start Date End Date Auth. Provider    sulfamethoxazole-trimethoprim 800-160mg (BACTRIM DS) 800-160 mg Tab Take 1 tablet by mouth 2 (two) times daily. for 7 days 14 tablet 7/2/2025 7/9/2025 Billy Samuel MD          Follow-up Information    None                [1]   Social History  Tobacco Use    Smoking status: Never    Smokeless tobacco: Never   Substance Use Topics    Alcohol use: No    Drug use: No        Billy Samuel MD  07/02/25 2146

## 2025-07-02 NOTE — FIRST PROVIDER EVALUATION
Medical screening examination initiated.  I have conducted a focused provider triage encounter, findings are as follows:    Brief history of present illness:  intermittent gross hematuria with clots    There were no vitals filed for this visit.    Pertinent physical exam:  nontoxic    Brief workup plan:  labs    Preliminary workup initiated; this workup will be continued and followed by the physician or advanced practice provider that is assigned to the patient when roomed.

## 2025-07-03 LAB — HOLD SPECIMEN: NORMAL

## 2025-07-03 NOTE — DISCHARGE INSTRUCTIONS
Follow up with Dr. Mckeon as previously scheduled     Take the Bactrim as prescribed     Return to the emergency department if any new or concerning symptoms occur

## 2025-07-03 NOTE — CONSULTS
[facility] - Emergency Dept  Urology  Consult Note    Patient Name: Theo Matthew Jr.  MRN: [id number]  Admission Date: 7/2/2025  Hospital Length of Stay: 0   Code Status: Prior   Attending Provider: Billy Samuel MD   Consulting Provider: Mark Haile MD  Primary Care Physician: Tenisha Becerra MD  Principal Problem:<principal problem not specified>    Inpatient consult to Urology  Consult performed by: Mark Haile MD  Consult ordered by: Billy Samuel MD  Reason for consult: complicated UTI in chronic stent, grade 5 prostate cancer          Subjective:     HPI:  Theo Matthew Jr. Is a 55 y.o. M with a known history of Grade Group 5 prostate cancer with known metastasis to pelvic lymph nodes and chronic bilateral hydronephrosis, on non gold standard medical therapy, with insulin and ivermectin therapy done by a wellness center in [place]. He presents to the ED with concerns about small blood clots in his urine this week, with the passage of a large clot yesterday. This week he has felt dysuria, urgency, frequency, and hematuria. But denies fevers, chills, nausea or vomiting. This is the first time he has seen blood in his urine since the placement of his original stents. He is scheduled for a cystoscopy with stent exchange next week on 07/09/25.     In the ED his PVR 15, microscopic UA >100 rbc, 22 wbc, moderate bacteria. Leukocyte positive, nitrite negative. Cr 1.2, at baseline of 1.1-1.3. CMP within normal limits. Hgb 13.9 otherwise CBC normal. Vital signs stable, nonfebrile.    He states he primarily came in because he had not heard from clinic staff about the passage of his blood clot, but otherwise he feels fine.    Past Medical History:   Diagnosis Date    Internal hemorrhoid        Past Surgical History:   Procedure Laterality Date    COLONOSCOPY  02/18/2020    COLONOSCOPY N/A 2/18/2020    Procedure: COLONOSCOPY/Suprep;  Surgeon: Agueda Jacques MD;  Location: [facility];  Service: Endoscopy;   Laterality: N/A;    CYSTOSCOPY W/ URETERAL STENT PLACEMENT Bilateral 1/10/2025    Procedure: CYSTOSCOPY, WITH URETERAL STENT INSERTION;  Surgeon: Isaiah Mckeon MD;  [facility];  Service: Urology;  Laterality: Bilateral;    IRRIGATION AND DEBRIDEMENT OF UPPER EXTREMITY Right 6/11/2024    Procedure: IRRIGATION AND DEBRIDEMENT, UPPER EXTREMITY;  Surgeon: Maryjo Chamberlain MD;  Location: [facility] OR;  Service: Orthopedics;  Laterality: Right;    KNEE LIGAMENT RECONSTRUCTION Right     PROSTATE BIOPSY N/A 9/27/2024    Procedure: BIOPSY, PROSTATE/TRANSPERINEAL;  Surgeon: Isaiah Mckeon MD;  Location: [facility] OR 1ST FLR;  Service: Urology;  Laterality: N/A;    REMOVAL-STENT Bilateral 1/10/2025    Procedure: REMOVAL-STENT;  Surgeon: Isaiah Mckeon MD;  [facility] 1ST F[facility];  Service: Urology;  Laterality: Bilateral;    REPAIR OF EXTENSOR TENDON Right 6/11/2024    Procedure: REPAIR, TENDON, EXTENSOR LONG FINGER;  Surgeon: Maryjo Chamberlain MD;  Location: [facility] OR;  Service: Orthopedics;  Laterality: Right;    RETROGRADE PYELOGRAPHY Bilateral 9/27/2024    Procedure: PYELOGRAM, RETROGRADE;  Surgeon: Isaiah Mckeon MD;  Location: [facility] 1ST FLR;  Service: Urology;  Laterality: Bilateral;    RETROGRADE PYELOGRAPHY Bilateral 1/10/2025    Procedure: PYELOGRAM, RETROGRADE;  Surgeon: Isaiah Mckeon MD;  [facility]: [facility] 1ST FLR;  Service: Urology;  Laterality: Bilateral;    TENOLYSIS, EXTENSOR, HAND OR FINGER Right 6/11/2024    Procedure: TENOLYSIS, EXTENSOR, HAND OR FINGER;  Surgeon: Maryjo Chamberlain MD;  Location: [facility] OR;  Service: Orthopedics;  Laterality: Right;    TENOSYNOVECTOMY Right 6/11/2024    Procedure: TENOSYNOVECTOMY, EXTENSOR TENDON LONG FINGER;  Surgeon: Maryjo Chamberlain MD;  Location: [facility] OR;  Service: Orthopedics;  Laterality: Right;    TRANSRECTAL ULTRASOUND EXAMINATION N/A 9/27/2024    Procedure: ULTRASOUND, RECTAL APPROACH;  Surgeon: Isaiah Mckeon MD;  [facility]: [facility] 1ST F[facility];  Service: Urology;   Laterality: N/A;    TRANSURETHRAL RESECTION OF PROSTATE N/A 9/27/2024    Procedure: TURP (TRANSURETHRAL RESECTION OF PROSTATE);  Surgeon: Isaiah Mckeon MD;  Location: [facility] OR 1ST FLR;  Service: Urology;  Laterality: N/A;    URETERAL STENT PLACEMENT Bilateral 9/27/2024    Procedure: INSERTION, STENT, URETER;  Surgeon: Isaiah Mckeon MD;  Location: [facility] OR 1ST FLR;  Service: Urology;  Laterality: Bilateral;       Review of patient's allergies indicates:  No Known Allergies    Family History       Problem Relation (Age of Onset)    Alcohol abuse Father    Arthritis Paternal Grandmother    Diabetes Maternal Grandfather    Hypertension Mother    No Known Problems Sister, Paternal Grandfather    Pancreatic cancer Maternal Grandmother    Prostate cancer Father, Maternal Grandfather    Seizures Father            Tobacco Use    Smoking status: Never    Smokeless tobacco: Never   Substance and Sexual Activity    Alcohol use: No    Drug use: No    Sexual activity: Yes     Partners: Female     Birth control/protection: None       Review of Systems   Genitourinary:  Positive for dysuria, frequency, hematuria and urgency. Negative for decreased urine volume, difficulty urinating, flank pain and penile pain.       Objective:     Temp:  [98.3 °F (36.8 °C)] 98.3 °F (36.8 °C)  Pulse:  [90] 90  Resp:  [19] 19  SpO2:  [97 %] 97 %  BP: (152)/(85) 152/85  Weight: 70.8 kg (156 lb)  Body mass index is 25.18 kg/m².           Drains       None                    Physical Exam  Constitutional:       Appearance: Normal appearance.   Eyes:      Pupils: Pupils are equal, round, and reactive to light.   Cardiovascular:      Rate and Rhythm: Normal rate.      Pulses: Normal pulses.   Pulmonary:      Effort: Pulmonary effort is normal.      Breath sounds: Normal breath sounds.   Abdominal:      General: Abdomen is flat.      Palpations: Abdomen is soft.      Tenderness: There is no right CVA tenderness or left CVA tenderness.   Genitourinary:      Comments: No suprapubic pain  Musculoskeletal:         General: Normal range of motion.      Cervical back: Normal range of motion.   Skin:     General: Skin is warm.      Capillary Refill: Capillary refill takes less than 2 seconds.   Neurological:      Mental Status: He is alert and oriented to person, place, and time.   Psychiatric:         Behavior: Behavior normal.          Significant Labs:    BMP:  Recent Labs   Lab 07/02/25  1548      K 4.6      CO2 27   BUN 16   CREATININE 1.2   CALCIUM 9.5       CBC:  Recent Labs   Lab 07/02/25  1548   WBC 4.71   HGB 13.9*   HCT 41.5          All pertinent labs results from the past 24 hours have been reviewed.    Significant Imaging:  All pertinent imaging results/findings from the past 24 hours have been reviewed.                    Assessment and Plan:     Complicated UTI (urinary tract infection)  Theo Matthew  Is a 55 y.o. M known to our oncology service with grade group 5 prostate cancer with metastasis to bones and iliac lymph nodes, with bilateral ureteral stents for hydronephrosis here with an acute complicated UTI, without signs of retention    -No recent imaging to follow up  -Explained to this patient, blood in urine is a common sequelae of stent presence; however in his case can be a sign of malignancy progression or UTI manifestation. He shows understanding.  -Next cysto stent exchange appointment with urology on 07/09/25  -Recommend discharge on 7 days of Bactrim  -Urine culture sent, we will follow up urine culture and tailor abx  -Can be discharged from a urological perspective  -Patient educated, if he feels concerns of retention or inability to pass urine to represent to the ED    Urology will now sign off.        VTE Risk Mitigation (From admission, onward)      None            Thank you for your consult. I will sign off. Please contact us if you have any additional questions.    Mark Haile MD  Urology  [facility] - Emergency  Dept

## 2025-07-03 NOTE — ASSESSMENT & PLAN NOTE
Theo CLIVE Matthew Jr. Is a 55 y.o. M known to our oncology service with grade group 5 prostate cancer with metastasis to bones and iliac lymph nodes, with bilateral ureteral stents for hydronephrosis here with an acute complicated UTI, without signs of retention    -No recent imaging to follow up  -Next cysto stent exchange appointment with urology on 07/09/25  -Recommend discharge on 7 days of Bactrim  -Urine culture sent, we will follow up urine culture and tailor abx  -Can be discharged from a urological perspective  -Patient educated, if he feels concerns of retention or inability to pass urine to represent to the ED    Urology will now sign off.

## 2025-07-04 LAB — BACTERIA UR CULT: NO GROWTH

## 2025-07-07 ENCOUNTER — TELEPHONE (OUTPATIENT)
Dept: PRIMARY CARE CLINIC | Facility: CLINIC | Age: 55
End: 2025-07-07
Payer: COMMERCIAL

## 2025-07-07 DIAGNOSIS — C61 PROSTATE CANCER: Primary | ICD-10-CM

## 2025-07-07 NOTE — TELEPHONE ENCOUNTER
Copied from CRM #[id number]. Topic: General Inquiry - Patient Advice  >> Jul 7, 2025  9:42 AM Katelynn wrote:  Patient would like to get a referral.    Referral to what specialty:  Urology      Does the patient want the referral with a specific physician:  Dr. Krystian Caballero Jr.    Is the specialist an [facility]:  N/A    Reason (be specific):  Patient have Prostate cancer/Patient would like a second opinion.     Does the patient already have the specialty clinic appointment scheduled: No [facility] [place] [facility]/  [facility] Phone# [phone]    If yes, what date is the appointment scheduled: N/A       Is the insurance listed in Epic correct? (this is important for a referral): Yes     Would the patient like a call back, or a response through their [facility] portal?:   Both

## 2025-07-08 ENCOUNTER — TELEPHONE (OUTPATIENT)
Dept: UROLOGY | Facility: CLINIC | Age: 55
End: 2025-07-08
Payer: COMMERCIAL

## 2025-07-08 NOTE — TELEPHONE ENCOUNTER
Spoke to patient earlier today regarding his procedure tomorrow. He does not wish to proceed at this time. He would like to do a virtual with Dr. Mckeon in the next week or so and reschedule. Patient aware that stents are not meant to stay in past expected time decided by physician.

## 2025-07-16 ENCOUNTER — OFFICE VISIT (OUTPATIENT)
Dept: UROLOGY | Facility: CLINIC | Age: 55
End: 2025-07-16
Payer: COMMERCIAL

## 2025-07-16 DIAGNOSIS — C61 PROSTATE CANCER: ICD-10-CM

## 2025-07-16 NOTE — PROGRESS NOTES
[facility]  Urologic Oncology    The patient location is: [facility]  The chief complaint leading to consultation is: Prostate Cancer    Visit type: audiovisual    Face to Face time with patient: 15  30 minutes of total time spent on the encounter, which includes face to face time and non-face to face time preparing to see the patient (eg, review of tests), Obtaining and/or reviewing separately obtained history, Documenting clinical information in the electronic or other health record, Independently interpreting results (not separately reported) and communicating results to the patient/family/caregiver, or Care coordination (not separately reported).     Each patient to whom he or she provides medical services by telemedicine is:  (1) informed of the relationship between the physician and patient and the respective role of any other health care provider with respect to management of the patient; and (2) notified that he or she may decline to receive medical services by telemedicine and may withdraw from such care at any time.    Date of Service: 07/16/2025    Urologic Oncology Problem List:  Very high-risk prostate cancer, PSA 40 at the time of diagnosis  Grade group 5 prostate cancer diagnosed on Transurethral resection for trigone invasion  PSMA PET scan showed uptake throughout the tracer, pelvic lymph nodes, and an L3 sclerotic lesion concerning for metastatic disease  Currently under the care of a naturopath physician in [place], patient has received our recommendations urine standard of care treatment and is not pursuing these  Bilateral hydronephrosis status post ureteral stent placement at the time of Transurethral resection on 09/27/2024  Last exchanged on 01/11/2025    History of Present Illness:   Patient is a very pleasant 55-year-old male well known to me, he has a history of metastatic prostate cancer.  He is currently receiving care through a naturopath using multiple medications  including ivermectin, rapamycin, metronidazole, I itraconazole.  He has not received therapy with this since March, we obtain a nuclear medicine bone scan in April with a CT abdomen and pelvis, given that a PSMA PET scan was denied by his insurance company despite his diagnosis of metastatic prostate cancer.    His last PSA was in November 2024, and was 40.1.  His naturopath has not been trending his PSMA and we have reordered this today    He has multiple questions regarding a recent ER visit due to hematuria and questions regarding this stents contributing to this.  We discussed invasion of the prostate into the bladder and this can contribute to his hematuria as well as the prostate cancer itself, there was always a chance that the stents are contributing to his hematuria, but he understands these are in place to protect his renal function, given his ADELSO prior to placement.    Imaging: I have reviewed the imaging study nuclear medicine bone scan on 04/16/2025, personally, have independently interpreted this, and agree with the findings.  I also reviewed the CT scan and was clear with the patient that he has had progression of disease based on these imaging studies    Allergies:  Review of patient's allergies indicates:  No Known Allergies     Medications per EMR:  Prescriptions Prior to Admission[1]    Past Medical History:  Past Medical History:   Diagnosis Date    Complicated UTI (urinary tract infection) 7/2/2025    Internal hemorrhoid         Past Surgical History:  Past Surgical History:   Procedure Laterality Date    COLONOSCOPY  02/18/2020    COLONOSCOPY N/A 2/18/2020    Procedure: COLONOSCOPY/Suprep;  Surgeon: Agueda Jacques MD;  [facility]: [facility];  Service: Endoscopy;  Laterality: N/A;    CYSTOSCOPY W/ URETERAL STENT PLACEMENT Bilateral 1/10/2025    Procedure: CYSTOSCOPY, WITH URETERAL STENT INSERTION;  Surgeon: Isaiah Mkceon MD;  Location: [facility] OR [address] [place] [address];  Service: Urology;  Laterality: Bilateral;     IRRIGATION AND DEBRIDEMENT OF UPPER EXTREMITY Right 6/11/2024    Procedure: IRRIGATION AND DEBRIDEMENT, UPPER EXTREMITY;  Surgeon: Maryjo Chamberlain MD;  Location: [facility] OR;  Service: Orthopedics;  Laterality: Right;    KNEE LIGAMENT RECONSTRUCTION Right     PROSTATE BIOPSY N/A 9/27/2024    Procedure: BIOPSY, PROSTATE/TRANSPERINEAL;  Surgeon: Isaiah Mckeon MD;  Location: [facility] OR 1ST FLR;  Service: Urology;  Laterality: N/A;    REMOVAL-STENT Bilateral 1/10/2025    Procedure: REMOVAL-STENT;  Surgeon: Isaiah Mckeon MD;  Location: [facility] OR 1ST FLR;  Service: Urology;  Laterality: Bilateral;    REPAIR OF EXTENSOR TENDON Right 6/11/2024    Procedure: REPAIR, TENDON, EXTENSOR LONG FINGER;  Surgeon: Maryjo Chamberlain MD;  Location: [facility] OR;  Service: Orthopedics;  Laterality: Right;    RETROGRADE PYELOGRAPHY Bilateral 9/27/2024    Procedure: PYELOGRAM, RETROGRADE;  Surgeon: Isaiah Mckeon MD;  Location: [facility] OR 1ST FLR;  Service: Urology;  Laterality: Bilateral;    RETROGRADE PYELOGRAPHY Bilateral 1/10/2025    Procedure: PYELOGRAM, RETROGRADE;  Surgeon: Isaiah Mckeon MD;  Location: [facility] 1ST FLR;  Service: Urology;  Laterality: Bilateral;    TENOLYSIS, EXTENSOR, HAND OR FINGER Right 6/11/2024    Procedure: TENOLYSIS, EXTENSOR, HAND OR FINGER;  Surgeon: Maryjo Chamberlain MD;  Location: [facility] OR;  Service: Orthopedics;  Laterality: Right;    TENOSYNOVECTOMY Right 6/11/2024    Procedure: TENOSYNOVECTOMY, EXTENSOR TENDON LONG FINGER;  Surgeon: Maryjo Chamberlain MD;  Location: [facility] OR;  Service: Orthopedics;  Laterality: Right;    TRANSRECTAL ULTRASOUND EXAMINATION N/A 9/27/2024    Procedure: ULTRASOUND, RECTAL APPROACH;  Surgeon: Isaiah Mckeon MD;  Location: [facility] OR 1ST FLR;  Service: Urology;  Laterality: N/A;    TRANSURETHRAL RESECTION OF PROSTATE N/A 9/27/2024    Procedure: TURP (TRANSURETHRAL RESECTION OF PROSTATE);  Surgeon: Isaiah Mckeon MD;  Location: [facility] OR 1ST FLR;  Service: Urology;  Laterality: N/A;    URETERAL  STENT PLACEMENT Bilateral 9/27/2024    Procedure: INSERTION, STENT, URETER;  Surgeon: Isaiah Mckeon MD;  Location: [facility] OR [address] [place] [address];  Service: Urology;  Laterality: Bilateral;        Family History:  Family History   Problem Relation Name Age of Onset    Hypertension Mother Mom     Seizures Father Theo Matthew Sr     Alcohol abuse Father Theo Matthew Sr     Prostate cancer Father Theo Matthew Sr     No Known Problems Sister      Pancreatic cancer Maternal Grandmother Chen Hernandez         passed 83 pancreate cancer    Diabetes Maternal Grandfather Lory Reeves     Prostate cancer Maternal Grandfather Lory Reeves     Arthritis Paternal Grandmother Lillian Boggs     No Known Problems Paternal Grandfather      Breast cancer Neg Hx      Ovarian cancer Neg Hx          Social History:  Social History     Tobacco Use    Smoking status: Never    Smokeless tobacco: Never   Substance Use Topics    Alcohol use: No          OBJECTIVE:     There were no vitals filed for this visit.     Physical Exam    Physical Exam    General: No acute distress. Nontoxic appearing.  HENT: Normocephalic. Atraumatic.  Respiratory: Normal respiratory effort. No conversational dyspnea. No audible wheezing.  Abdomen: No obvious distension.  Skin: No visible abnormalities.  Extremities: No edema upper extremities. No edema lower extremities.  Neurological: Alert and oriented x3. Normal speech.  Psychiatric: Normal mood. Normal affect. No evidence of SI.           LABS:    CBC:  Lab Results   Component Value Date    WBC 4.71 07/02/2025    HGB 13.9 (L) 07/02/2025    HCT 41.5 07/02/2025    MCV 87 07/02/2025     07/02/2025         BMP:  Lab Results   Component Value Date     07/02/2025    K 4.6 07/02/2025     07/02/2025    CO2 27 07/02/2025    BUN 16 07/02/2025    CREATININE 1.2 07/02/2025    CALCIUM 9.5 07/02/2025    ANIONGAP 9 07/02/2025    EGFRNORACEVR >60 07/02/2025         ASSESSMENT/PLAN:     Metastatic prostate cancer:   -I had a  thorough discussion with the patient and was very clear again regarding the standard of care which he is not currently on.  He is pursuing treatment with a naturopath, and he understands the lack of data available to support this treatment, and he understands deviation from the standard of care despite our recommendations.  -was clear with the patient that his prostate cancer is progressing based on his nuclear medicine bone scan and CT abdomen and pelvis  -we will recheck a PSA today, I have ordered a PSMA PET scan    Hydronephrosis:   -had a thorough discussion with the patient regarding his hydronephrosis in the need for ureteral stent placement, he would like to pursue stent removal and I advised strongly against this.  We will monitor the PSMA PET scan to see if his lymphadenopathy has decreased.  I explained the need for stent exchange and we will get him scheduled for this, he canceled his last appointment due to multiple questions, which we advanced her today  -rescheduling cystoscopy and stent exchange in the operating room  -discussed the need for stent exchange and encrustation leading to renal failure as well as retained ureteral stents    Hematuria:  -discussed possibilities of hematuria including urinary tract infection, indwelling stents, and locally invasive prostate cancer bleeding into the bladder      I spent a total of 30 minutes on the day of the visit.This includes face to face time and non-face to face time preparing to see the patient (eg, review of tests), obtaining and/or reviewing separately obtained history, documenting clinical information in the electronic or other health record, independently interpreting results and communicating results to the patient/family/caregiver, or care coordinator    - code applied: patient requires or will require a continuous, longitudinal, and active collaborative plan of care related to this patient's health condition, prostate cancer --the  management of which requires the direction of a practitioner with specialized clinical knowledge, skill, and expertise.     This encounter was dictated and transcribed using DeepScribe and [employer], please excuse any typographical or grammatical errors.                     [1] (Not in a hospital admission)

## 2025-08-06 ENCOUNTER — LAB VISIT (OUTPATIENT)
Dept: LAB | Facility: HOSPITAL | Age: 55
End: 2025-08-06
Attending: UROLOGY
Payer: COMMERCIAL

## 2025-08-06 ENCOUNTER — OFFICE VISIT (OUTPATIENT)
Dept: UROLOGY | Facility: CLINIC | Age: 55
End: 2025-08-06
Payer: COMMERCIAL

## 2025-08-06 VITALS
WEIGHT: 170.88 LBS | SYSTOLIC BLOOD PRESSURE: 129 MMHG | BODY MASS INDEX: 27.46 KG/M2 | DIASTOLIC BLOOD PRESSURE: 75 MMHG | HEIGHT: 66 IN | HEART RATE: 73 BPM

## 2025-08-06 DIAGNOSIS — C61 PROSTATE CANCER: ICD-10-CM

## 2025-08-06 DIAGNOSIS — C61 PROSTATE CANCER: Primary | ICD-10-CM

## 2025-08-06 DIAGNOSIS — N13.30 HYDRONEPHROSIS, UNSPECIFIED HYDRONEPHROSIS TYPE: ICD-10-CM

## 2025-08-06 LAB
ANION GAP (OHS): 10 MMOL/L (ref 8–16)
BUN SERPL-MCNC: 15 MG/DL (ref 6–20)
CALCIUM SERPL-MCNC: 9.3 MG/DL (ref 8.7–10.5)
CHLORIDE SERPL-SCNC: 106 MMOL/L (ref 95–110)
CO2 SERPL-SCNC: 27 MMOL/L (ref 23–29)
CREAT SERPL-MCNC: 1 MG/DL (ref 0.5–1.4)
GFR SERPLBLD CREATININE-BSD FMLA CKD-EPI: >60 ML/MIN/1.73/M2
GLUCOSE SERPL-MCNC: 89 MG/DL (ref 70–110)
POTASSIUM SERPL-SCNC: 4.5 MMOL/L (ref 3.5–5.1)
PSA SERPL-MCNC: 95.55 NG/ML
SODIUM SERPL-SCNC: 143 MMOL/L (ref 136–145)

## 2025-08-06 PROCEDURE — 3044F HG A1C LEVEL LT 7.0%: CPT | Mod: CPTII,S$GLB,, | Performed by: UROLOGY

## 2025-08-06 PROCEDURE — 3008F BODY MASS INDEX DOCD: CPT | Mod: CPTII,S$GLB,, | Performed by: UROLOGY

## 2025-08-06 PROCEDURE — 1159F MED LIST DOCD IN RCRD: CPT | Mod: CPTII,S$GLB,, | Performed by: UROLOGY

## 2025-08-06 PROCEDURE — 99999 PR PBB SHADOW E&M-EST. PATIENT-LVL IV: CPT | Mod: PBBFAC,,, | Performed by: UROLOGY

## 2025-08-06 PROCEDURE — 36415 COLL VENOUS BLD VENIPUNCTURE: CPT

## 2025-08-06 PROCEDURE — 99214 OFFICE O/P EST MOD 30 MIN: CPT | Mod: S$GLB,,, | Performed by: UROLOGY

## 2025-08-06 PROCEDURE — 3078F DIAST BP <80 MM HG: CPT | Mod: CPTII,S$GLB,, | Performed by: UROLOGY

## 2025-08-06 PROCEDURE — 3074F SYST BP LT 130 MM HG: CPT | Mod: CPTII,S$GLB,, | Performed by: UROLOGY

## 2025-08-06 PROCEDURE — G2211 COMPLEX E/M VISIT ADD ON: HCPCS | Mod: S$GLB,,, | Performed by: UROLOGY

## 2025-08-06 PROCEDURE — 82310 ASSAY OF CALCIUM: CPT

## 2025-08-06 PROCEDURE — 84153 ASSAY OF PSA TOTAL: CPT

## 2025-08-07 ENCOUNTER — PATIENT MESSAGE (OUTPATIENT)
Dept: UROLOGY | Facility: CLINIC | Age: 55
End: 2025-08-07
Payer: COMMERCIAL

## 2025-08-12 DIAGNOSIS — N13.30 HYDRONEPHROSIS, UNSPECIFIED HYDRONEPHROSIS TYPE: Primary | ICD-10-CM

## 2025-08-21 ENCOUNTER — PATIENT MESSAGE (OUTPATIENT)
Dept: UROLOGY | Facility: CLINIC | Age: 55
End: 2025-08-21
Payer: COMMERCIAL

## 2025-08-21 ENCOUNTER — TELEPHONE (OUTPATIENT)
Dept: UROLOGY | Facility: CLINIC | Age: 55
End: 2025-08-21
Payer: COMMERCIAL

## 2025-08-22 ENCOUNTER — HOSPITAL ENCOUNTER (OUTPATIENT)
Facility: HOSPITAL | Age: 55
Discharge: HOME | End: 2025-08-22
Attending: UROLOGY | Admitting: UROLOGY
Payer: COMMERCIAL

## 2025-08-22 ENCOUNTER — ANESTHESIA (OUTPATIENT)
Dept: SURGERY | Facility: HOSPITAL | Age: 55
End: 2025-08-22
Payer: COMMERCIAL

## 2025-08-22 ENCOUNTER — ANESTHESIA EVENT (OUTPATIENT)
Dept: SURGERY | Facility: HOSPITAL | Age: 55
End: 2025-08-22
Payer: COMMERCIAL

## 2025-08-22 VITALS
HEIGHT: 66 IN | SYSTOLIC BLOOD PRESSURE: 135 MMHG | RESPIRATION RATE: 16 BRPM | DIASTOLIC BLOOD PRESSURE: 86 MMHG | OXYGEN SATURATION: 100 % | TEMPERATURE: 97 F | WEIGHT: 165 LBS | BODY MASS INDEX: 26.52 KG/M2 | HEART RATE: 61 BPM

## 2025-08-22 DIAGNOSIS — C61 PROSTATE CANCER: Primary | ICD-10-CM

## 2025-08-22 DIAGNOSIS — N39.0 COMPLICATED UTI (URINARY TRACT INFECTION): ICD-10-CM

## 2025-08-22 PROCEDURE — 37000008 HC ANESTHESIA 1ST 15 MINUTES: Performed by: UROLOGY

## 2025-08-22 PROCEDURE — 74420 UROGRAPHY RTRGR +-KUB: CPT | Mod: 26,,, | Performed by: UROLOGY

## 2025-08-22 PROCEDURE — C1769 GUIDE WIRE: HCPCS | Performed by: UROLOGY

## 2025-08-22 PROCEDURE — 25000003 PHARM REV CODE 250: Performed by: NURSE ANESTHETIST, CERTIFIED REGISTERED

## 2025-08-22 PROCEDURE — 71000044 HC DOSC ROUTINE RECOVERY FIRST HOUR: Performed by: UROLOGY

## 2025-08-22 PROCEDURE — 25500020 PHARM REV CODE 255: Performed by: UROLOGY

## 2025-08-22 PROCEDURE — 71000016 HC POSTOP RECOV ADDL HR: Performed by: UROLOGY

## 2025-08-22 PROCEDURE — 63600175 PHARM REV CODE 636 W HCPCS: Performed by: NURSE ANESTHETIST, CERTIFIED REGISTERED

## 2025-08-22 PROCEDURE — 36000706: Performed by: UROLOGY

## 2025-08-22 PROCEDURE — 63600175 PHARM REV CODE 636 W HCPCS

## 2025-08-22 PROCEDURE — 37000009 HC ANESTHESIA EA ADD 15 MINS: Performed by: UROLOGY

## 2025-08-22 PROCEDURE — 52332 CYSTOSCOPY AND TREATMENT: CPT | Mod: 50,,, | Performed by: UROLOGY

## 2025-08-22 PROCEDURE — 36000707: Performed by: UROLOGY

## 2025-08-22 PROCEDURE — C2617 STENT, NON-COR, TEM W/O DEL: HCPCS | Performed by: UROLOGY

## 2025-08-22 PROCEDURE — 71000015 HC POSTOP RECOV 1ST HR: Performed by: UROLOGY

## 2025-08-22 PROCEDURE — C1758 CATHETER, URETERAL: HCPCS | Performed by: UROLOGY

## 2025-08-22 DEVICE — STENT TRIA SOFT MFIL 6FR 26CM: Type: IMPLANTABLE DEVICE | Site: URETER | Status: FUNCTIONAL

## 2025-08-22 RX ORDER — CEFAZOLIN 2 G/1
2 INJECTION, POWDER, FOR SOLUTION INTRAMUSCULAR; INTRAVENOUS
Status: COMPLETED | OUTPATIENT
Start: 2025-08-22 | End: 2025-08-22

## 2025-08-22 RX ORDER — FENTANYL CITRATE 50 UG/ML
INJECTION, SOLUTION INTRAMUSCULAR; INTRAVENOUS
Status: DISCONTINUED | OUTPATIENT
Start: 2025-08-22 | End: 2025-08-22

## 2025-08-22 RX ORDER — MIDAZOLAM HYDROCHLORIDE 1 MG/ML
INJECTION INTRAMUSCULAR; INTRAVENOUS
Status: DISCONTINUED | OUTPATIENT
Start: 2025-08-22 | End: 2025-08-22

## 2025-08-22 RX ORDER — ONDANSETRON HYDROCHLORIDE 2 MG/ML
4 INJECTION, SOLUTION INTRAVENOUS DAILY PRN
Status: DISCONTINUED | OUTPATIENT
Start: 2025-08-22 | End: 2025-08-22 | Stop reason: HOSPADM

## 2025-08-22 RX ORDER — GLUCAGON 1 MG
1 KIT INJECTION
Status: DISCONTINUED | OUTPATIENT
Start: 2025-08-22 | End: 2025-08-22 | Stop reason: HOSPADM

## 2025-08-22 RX ORDER — LIDOCAINE HYDROCHLORIDE 20 MG/ML
INJECTION INTRAVENOUS
Status: DISCONTINUED | OUTPATIENT
Start: 2025-08-22 | End: 2025-08-22

## 2025-08-22 RX ORDER — FENTANYL CITRATE 50 UG/ML
25 INJECTION, SOLUTION INTRAMUSCULAR; INTRAVENOUS EVERY 5 MIN PRN
Status: DISCONTINUED | OUTPATIENT
Start: 2025-08-22 | End: 2025-08-22 | Stop reason: HOSPADM

## 2025-08-22 RX ORDER — PROPOFOL 10 MG/ML
VIAL (ML) INTRAVENOUS
Status: DISCONTINUED | OUTPATIENT
Start: 2025-08-22 | End: 2025-08-22

## 2025-08-22 RX ADMIN — SODIUM CHLORIDE: 0.9 INJECTION, SOLUTION INTRAVENOUS at 11:08

## 2025-08-22 RX ADMIN — FENTANYL CITRATE 50 MCG: 50 INJECTION, SOLUTION INTRAMUSCULAR; INTRAVENOUS at 11:08

## 2025-08-22 RX ADMIN — CEFAZOLIN 2 G: 2 INJECTION, POWDER, FOR SOLUTION INTRAMUSCULAR; INTRAVENOUS at 11:08

## 2025-08-22 RX ADMIN — MIDAZOLAM HYDROCHLORIDE 2 MG: 2 INJECTION, SOLUTION INTRAMUSCULAR; INTRAVENOUS at 10:08

## 2025-08-22 RX ADMIN — PROPOFOL 140 MCG/KG/MIN: 10 INJECTION, EMULSION INTRAVENOUS at 11:08

## 2025-08-22 RX ADMIN — PROPOFOL 20 MG: 10 INJECTION, EMULSION INTRAVENOUS at 11:08

## 2025-08-22 RX ADMIN — LIDOCAINE HYDROCHLORIDE 50 MG: 20 INJECTION INTRAVENOUS at 11:08

## 2025-08-26 ENCOUNTER — TELEPHONE (OUTPATIENT)
Dept: UROLOGY | Facility: CLINIC | Age: 55
End: 2025-08-26
Payer: COMMERCIAL

## (undated) DEVICE — GLOVE SENSICARE PI MICRO 7.5

## (undated) DEVICE — PACK CYSTOSCOPY III SIRUS

## (undated) DEVICE — SOL NACL IRR 3000ML

## (undated) DEVICE — UNDERGLOVE BIOGEL PI SZ 6.5 LF

## (undated) DEVICE — PAD CAST SPECIALIST STRL 3

## (undated) DEVICE — CATH POLLACK OPEN-END FLEXI-TI

## (undated) DEVICE — SYR 10CC LUER LOCK

## (undated) DEVICE — GAUZE SPONGE XRAY 4X4

## (undated) DEVICE — BLADE SURG #15 CARBON STEEL

## (undated) DEVICE — SUT PROLENE C-1 6-0

## (undated) DEVICE — LOOP CUT BIPOLAR 24/26FR YEL

## (undated) DEVICE — GOWN ECLIPSE REINF LVL4 TWL XL

## (undated) DEVICE — TRAY CYSTO BASIN OMC

## (undated) DEVICE — SET SINGLE BASIN

## (undated) DEVICE — GLOVE SENSICARE PI SURG 7.5

## (undated) DEVICE — GUIDE WIRE MOTION .035 X 150CM

## (undated) DEVICE — DRESSING N ADH OIL EMUL 3X3

## (undated) DEVICE — GLOVE SENSICARE PI GRN 7.5

## (undated) DEVICE — COVER CAMERA OPERATING ROOM

## (undated) DEVICE — SUT PROLENE 3-0 FS-2 18

## (undated) DEVICE — SUT MONOCRYL PLUS 4-0 P3

## (undated) DEVICE — SLING ARM LARGE FOAM STRAP

## (undated) DEVICE — UNDERGLOVES BIOGEL PI SIZE 7.5

## (undated) DEVICE — TOWEL OR DISP STRL BLUE 4/PK

## (undated) DEVICE — UNDERPAD ULTRASORB 300LB 30X36

## (undated) DEVICE — ADAPTER HOSE 10FT 8MM

## (undated) DEVICE — BANDAGE COFLEX LF2 TAN 3X5YD

## (undated) DEVICE — NDL MAXCORE BIOPSY 18G 25CM

## (undated) DEVICE — SPONGE COTTON TRAY 4X4IN

## (undated) DEVICE — SOL IRR SOD CHL .9% POUR

## (undated) DEVICE — SOL IRRI STRL WATER 1000ML

## (undated) DEVICE — TOURNIQUET SB QC DP 18X4IN

## (undated) DEVICE — UNDERGLOVES BIOGEL PI SZ 7 LF

## (undated) DEVICE — GLOVE BIOGEL PI MICRO SZ 7

## (undated) DEVICE — NDL 22GA X1 1/2 REG BEVEL

## (undated) DEVICE — FORCEP STRAIGHT DISP

## (undated) DEVICE — GAUZE SPONGE 4X4 12PLY

## (undated) DEVICE — COVER TRANSDUCER LATEX N/STERI

## (undated) DEVICE — Device

## (undated) DEVICE — CORD BIPOLAR 12 FOOT

## (undated) DEVICE — SPLINT PLASTER F.S 4INX15IN

## (undated) DEVICE — SUT 4/0 18IN PDS II CLR MO

## (undated) DEVICE — SYR 50ML CATH TIP

## (undated) DEVICE — SOL NACL IRR 1000ML BTL

## (undated) DEVICE — GLOVE BIOGEL PI MICRO INDIC 7

## (undated) DEVICE — SOL IRR NACL .9% 3000ML